# Patient Record
Sex: FEMALE | Race: BLACK OR AFRICAN AMERICAN | NOT HISPANIC OR LATINO | Employment: OTHER | URBAN - METROPOLITAN AREA
[De-identification: names, ages, dates, MRNs, and addresses within clinical notes are randomized per-mention and may not be internally consistent; named-entity substitution may affect disease eponyms.]

---

## 2018-07-08 ENCOUNTER — HOSPITAL ENCOUNTER (EMERGENCY)
Facility: HOSPITAL | Age: 73
Discharge: HOME | End: 2018-07-08
Attending: INTERNAL MEDICINE
Payer: MEDICARE

## 2018-07-08 VITALS
SYSTOLIC BLOOD PRESSURE: 173 MMHG | OXYGEN SATURATION: 96 % | DIASTOLIC BLOOD PRESSURE: 91 MMHG | TEMPERATURE: 98 F | WEIGHT: 159 LBS | HEIGHT: 65 IN | BODY MASS INDEX: 26.49 KG/M2 | HEART RATE: 88 BPM | RESPIRATION RATE: 18 BRPM

## 2018-07-08 DIAGNOSIS — R51.9 ACUTE NONINTRACTABLE HEADACHE, UNSPECIFIED HEADACHE TYPE: Primary | ICD-10-CM

## 2018-07-08 DIAGNOSIS — R42 DIZZINESS: ICD-10-CM

## 2018-07-08 LAB
ALBUMIN SERPL-MCNC: 3 G/DL (ref 3.3–5.5)
ALP SERPL-CCNC: 83 U/L (ref 42–141)
BILIRUB SERPL-MCNC: 0.4 MG/DL (ref 0.2–1.6)
BUN SERPL-MCNC: 10 MG/DL (ref 7–22)
CALCIUM SERPL-MCNC: 9.7 MG/DL (ref 8–10.3)
CHLORIDE SERPL-SCNC: 102 MMOL/L (ref 98–108)
CREAT SERPL-MCNC: 0.5 MG/DL (ref 0.6–1.2)
GLUCOSE SERPL-MCNC: 84 MG/DL (ref 73–118)
POC ALT (SGPT): 19 U/L (ref 10–47)
POC AST (SGOT): 31 U/L (ref 11–38)
POC CARDIAC TROPONIN I: 0 NG/ML
POC TCO2: 28 MMOL/L (ref 18–33)
POCT GLUCOSE: 163 MG/DL (ref 70–110)
POTASSIUM BLD-SCNC: 3.4 MMOL/L (ref 3.6–5.1)
PROTEIN, POC: 8.2 G/DL (ref 6.4–8.1)
SAMPLE: NORMAL
SODIUM BLD-SCNC: 142 MMOL/L (ref 128–145)

## 2018-07-08 PROCEDURE — 93010 ELECTROCARDIOGRAM REPORT: CPT | Mod: ,,, | Performed by: INTERNAL MEDICINE

## 2018-07-08 PROCEDURE — 96360 HYDRATION IV INFUSION INIT: CPT

## 2018-07-08 PROCEDURE — 25000003 PHARM REV CODE 250: Performed by: INTERNAL MEDICINE

## 2018-07-08 PROCEDURE — 82947 ASSAY GLUCOSE BLOOD QUANT: CPT

## 2018-07-08 PROCEDURE — 99284 EMERGENCY DEPT VISIT MOD MDM: CPT | Mod: 25

## 2018-07-08 PROCEDURE — 93005 ELECTROCARDIOGRAM TRACING: CPT

## 2018-07-08 PROCEDURE — 80053 COMPREHEN METABOLIC PANEL: CPT

## 2018-07-08 PROCEDURE — 84484 ASSAY OF TROPONIN QUANT: CPT

## 2018-07-08 PROCEDURE — 85025 COMPLETE CBC W/AUTO DIFF WBC: CPT

## 2018-07-08 RX ORDER — SODIUM CHLORIDE 9 MG/ML
500 INJECTION, SOLUTION INTRAVENOUS ONCE
Status: COMPLETED | OUTPATIENT
Start: 2018-07-08 | End: 2018-07-08

## 2018-07-08 RX ORDER — ACETAMINOPHEN 500 MG
1000 TABLET ORAL
Status: COMPLETED | OUTPATIENT
Start: 2018-07-08 | End: 2018-07-08

## 2018-07-08 RX ORDER — POTASSIUM CHLORIDE 20 MEQ/1
40 TABLET, EXTENDED RELEASE ORAL
Status: COMPLETED | OUTPATIENT
Start: 2018-07-08 | End: 2018-07-08

## 2018-07-08 RX ADMIN — ACETAMINOPHEN 1000 MG: 500 TABLET, FILM COATED ORAL at 07:07

## 2018-07-08 RX ADMIN — SODIUM CHLORIDE 500 ML: 0.9 INJECTION, SOLUTION INTRAVENOUS at 09:07

## 2018-07-08 RX ADMIN — POTASSIUM CHLORIDE 40 MEQ: 1500 TABLET, EXTENDED RELEASE ORAL at 09:07

## 2018-07-09 NOTE — ED PROVIDER NOTES
Encounter Date: 7/8/2018    SCRIBE #1 NOTE: I, Ashley Davis, am scribing for, and in the presence of,  Dr. Bruce. I have scribed the entire note.       History     Chief Complaint   Patient presents with    Headache     headache to left side, pt woke this am with headache.  dizziness     This is a 73 year old female who presents with a headache for a couple hours. She describes the pain as sharp in quality. She rates the pain as 8/10 in severity. She reports the headache is localized to the back of her head. She states it feels like the last time she had a stroke. 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 She reports a stent on right side. She denies any weakness. She reports associated dizziness for the past couple hours. She denies falling or syncope. She is a current smoker and smokes 1 pack a week. She denies dizziness currently.      The history is provided by the patient and a relative.     Review of patient's allergies indicates:   Allergen Reactions    Penicillins      Past Medical History:   Diagnosis Date    Brain aneurysm     Cancer     thyroid    Diabetes mellitus     Hypertension     Stroke      Past Surgical History:   Procedure Laterality Date    OTHER SURGICAL HISTORY      stent in brain      History reviewed. No pertinent family history.  Social History   Substance Use Topics    Smoking status: Unknown If Ever Smoked    Smokeless tobacco: Not on file    Alcohol use No     Review of Systems   Constitutional: Negative.  Negative for chills and fever.   Respiratory: Negative.  Negative for shortness of breath.    Cardiovascular: Negative.  Negative for chest pain.   Gastrointestinal: Negative.  Negative for abdominal distention, nausea and vomiting.   Neurological: Positive for dizziness and headaches. Negative for weakness.   All other systems reviewed and are negative.      Physical Exam     Initial Vitals [07/08/18 1848]   BP Pulse Resp Temp SpO2   (!) 167/80 95 19 98.1 °F (36.7  °C) 97 %      MAP       --         Physical Exam    Nursing note and vitals reviewed.  Constitutional: She appears well-developed and well-nourished.   HENT:   Head: Normocephalic and atraumatic.   Right Ear: External ear normal.   Left Ear: External ear normal.   Nose: Nose normal.   Mouth/Throat: Mucous membranes are normal. Posterior oropharyngeal erythema present. No oropharyngeal exudate or posterior oropharyngeal edema.   Enlarged nasal turbinates   Eyes: Conjunctivae and EOM are normal. Pupils are equal, round, and reactive to light.   Neck: Normal range of motion. Neck supple.   Cardiovascular: Normal rate and normal heart sounds. Exam reveals no gallop and no friction rub.    No murmur heard.  Pulses:       Radial pulses are 2+ on the right side, and 2+ on the left side.        Dorsalis pedis pulses are 2+ on the right side, and 2+ on the left side.   Regularly irregular rhythm with a rate of 67 bpm.   Pulmonary/Chest: Breath sounds normal. No respiratory distress. She has no wheezes. She has no rhonchi. She has no rales. She exhibits no tenderness.   Abdominal: Soft. Bowel sounds are normal. She exhibits no distension and no mass. There is no tenderness. There is no rebound and no guarding.   Musculoskeletal: Normal range of motion. She exhibits no tenderness.   Neurological: She is alert and oriented to person, place, and time. She has normal strength. No cranial nerve deficit or sensory deficit.   Skin: Skin is warm and dry. Capillary refill takes less than 2 seconds. No rash noted.   Psychiatric: She has a normal mood and affect. Her behavior is normal.         ED Course   Procedures  Labs Reviewed   POCT GLUCOSE - Abnormal; Notable for the following:        Result Value    POCT Glucose 163 (*)     All other components within normal limits   POCT GLUCOSE     EKG Readings: (Independently Interpreted)   Initial Reading: No STEMI. Rhythm: Normal Sinus Rhythm. Heart Rate: 86. Axis: Left Axis Deviation.  Clinical Impression: AV Block - 1st Degree with RBBB       Imaging Results    None          Medical Decision Making:   Initial Assessment:   This is a 73 year old female who presents with a headache for a couple hours. She describes the pain as sharp in quality. She rates the pain as 8/10 in severity. She reports the headache is localized to the back of her head. She states it feels like the last time she had a stroke. 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 She reports a stent on right side. She denies any weakness. She reports associated dizziness for the past couple hours. She denies falling or syncope. She is a current smoker and smokes 1 pack a week.  Clinical Tests:   Lab Tests: Ordered and Reviewed  Radiological Study: Reviewed and Ordered  Medical Tests: Ordered and Reviewed  ED Management:  CT reveals no acute intracranial disease.  CBC and CMP were grossly normal except for slightly decreased potassium.  Oral potassium was given in the emergency department and the patient received Tylenol for headache.  She states she feels better after medications and 500 cc normal saline bolus.  She was given instructions for headache and advised to follow-up with her primary care physician tomorrow for reevaluation and to return to the emergency department if condition worsens.            Scribe Attestation:   Scribe #1: I performed the above scribed service and the documentation accurately describes the services I performed. I attest to the accuracy of the note.    This document was produced by a scribe under my direction and in my presence. I agree with the content of the note and have made any necessary edits.     Dr. Bruce    07/08/2018 9:24 PM             Clinical Impression:     1. Acute nonintractable headache, unspecified headache type    2. Dizziness          Disposition:   Disposition: Discharged  Condition: Stable                        Maninder Bruce MD  07/08/18 2128       Maninder Bruce,  MD  07/09/18 0622

## 2020-02-07 ENCOUNTER — HOSPITAL ENCOUNTER (INPATIENT)
Facility: HOSPITAL | Age: 75
LOS: 18 days | Discharge: HOME HEALTH | DRG: 392 | End: 2020-02-25
Attending: HOSPITALIST | Admitting: HOSPITALIST
Payer: MEDICARE

## 2020-02-07 DIAGNOSIS — J44.9 CHRONIC OBSTRUCTIVE PULMONARY DISEASE, UNSPECIFIED COPD TYPE: Primary | ICD-10-CM

## 2020-02-07 DIAGNOSIS — R53.81 DEBILITY: ICD-10-CM

## 2020-02-07 DIAGNOSIS — R13.10 DYSPHAGIA, UNSPECIFIED TYPE: ICD-10-CM

## 2020-02-07 DIAGNOSIS — K63.0 ABSCESS OF SIGMOID COLON: ICD-10-CM

## 2020-02-07 DIAGNOSIS — I50.9 HEART FAILURE: ICD-10-CM

## 2020-02-07 DIAGNOSIS — R94.31 QT PROLONGATION: ICD-10-CM

## 2020-02-07 DIAGNOSIS — I48.0 PAROXYSMAL A-FIB: ICD-10-CM

## 2020-02-07 DIAGNOSIS — I10 ESSENTIAL HYPERTENSION: ICD-10-CM

## 2020-02-07 DIAGNOSIS — R06.03 RESPIRATORY DISTRESS: ICD-10-CM

## 2020-02-07 DIAGNOSIS — E03.9 HYPOTHYROIDISM, UNSPECIFIED TYPE: ICD-10-CM

## 2020-02-07 DIAGNOSIS — B95.61 MSSA BACTEREMIA: ICD-10-CM

## 2020-02-07 DIAGNOSIS — E87.0 HYPERNATREMIA: ICD-10-CM

## 2020-02-07 DIAGNOSIS — R78.81 MSSA BACTEREMIA: ICD-10-CM

## 2020-02-07 DIAGNOSIS — K81.9 CHOLECYSTITIS: ICD-10-CM

## 2020-02-07 DIAGNOSIS — Z75.8 DISCHARGE PLANNING ISSUES: ICD-10-CM

## 2020-02-07 PROBLEM — D50.0 BLOOD LOSS ANEMIA: Status: ACTIVE | Noted: 2020-02-07

## 2020-02-07 PROBLEM — E11.9 TYPE 2 DIABETES MELLITUS, WITHOUT LONG-TERM CURRENT USE OF INSULIN: Status: ACTIVE | Noted: 2020-02-07

## 2020-02-07 PROBLEM — E66.3 OVERWEIGHT (BMI 25.0-29.9): Status: ACTIVE | Noted: 2020-02-07

## 2020-02-07 PROBLEM — R31.9 HEMATURIA: Status: ACTIVE | Noted: 2020-02-07

## 2020-02-07 PROBLEM — J96.91 RESPIRATORY FAILURE WITH HYPOXIA: Status: ACTIVE | Noted: 2020-02-07

## 2020-02-07 LAB
ABO + RH BLD: NORMAL
ALBUMIN SERPL BCP-MCNC: 2.1 G/DL (ref 3.5–5.2)
ALP SERPL-CCNC: 104 U/L (ref 55–135)
ALT SERPL W/O P-5'-P-CCNC: 9 U/L (ref 10–44)
ANION GAP SERPL CALC-SCNC: 10 MMOL/L (ref 8–16)
AST SERPL-CCNC: 14 U/L (ref 10–40)
BASOPHILS # BLD AUTO: 0.03 K/UL (ref 0–0.2)
BASOPHILS NFR BLD: 0.3 % (ref 0–1.9)
BILIRUB SERPL-MCNC: 0.4 MG/DL (ref 0.1–1)
BLD GP AB SCN CELLS X3 SERPL QL: NORMAL
BUN SERPL-MCNC: 11 MG/DL (ref 8–23)
CALCIUM SERPL-MCNC: 8.5 MG/DL (ref 8.7–10.5)
CHLORIDE SERPL-SCNC: 106 MMOL/L (ref 95–110)
CO2 SERPL-SCNC: 27 MMOL/L (ref 23–29)
CREAT SERPL-MCNC: 1.1 MG/DL (ref 0.5–1.4)
DIFFERENTIAL METHOD: ABNORMAL
EOSINOPHIL # BLD AUTO: 0.4 K/UL (ref 0–0.5)
EOSINOPHIL NFR BLD: 3.8 % (ref 0–8)
ERYTHROCYTE [DISTWIDTH] IN BLOOD BY AUTOMATED COUNT: 19.1 % (ref 11.5–14.5)
EST. GFR  (AFRICAN AMERICAN): 58 ML/MIN/1.73 M^2
EST. GFR  (NON AFRICAN AMERICAN): 50.3 ML/MIN/1.73 M^2
ESTIMATED AVG GLUCOSE: 151 MG/DL (ref 68–131)
GLUCOSE SERPL-MCNC: 191 MG/DL (ref 70–110)
HBA1C MFR BLD HPLC: 6.9 % (ref 4–5.6)
HCT VFR BLD AUTO: 25.9 % (ref 37–48.5)
HGB BLD-MCNC: 8.2 G/DL (ref 12–16)
IMM GRANULOCYTES # BLD AUTO: 0.06 K/UL (ref 0–0.04)
IMM GRANULOCYTES NFR BLD AUTO: 0.6 % (ref 0–0.5)
INR PPP: 1.1 (ref 0.8–1.2)
LACTATE SERPL-SCNC: 1.4 MMOL/L (ref 0.5–2.2)
LYMPHOCYTES # BLD AUTO: 1.7 K/UL (ref 1–4.8)
LYMPHOCYTES NFR BLD: 18.5 % (ref 18–48)
MAGNESIUM SERPL-MCNC: 1.7 MG/DL (ref 1.6–2.6)
MCH RBC QN AUTO: 27.8 PG (ref 27–31)
MCHC RBC AUTO-ENTMCNC: 31.7 G/DL (ref 32–36)
MCV RBC AUTO: 88 FL (ref 82–98)
MONOCYTES # BLD AUTO: 1.1 K/UL (ref 0.3–1)
MONOCYTES NFR BLD: 12.2 % (ref 4–15)
NEUTROPHILS # BLD AUTO: 6.1 K/UL (ref 1.8–7.7)
NEUTROPHILS NFR BLD: 64.6 % (ref 38–73)
NRBC BLD-RTO: 0 /100 WBC
PHOSPHATE SERPL-MCNC: 2.6 MG/DL (ref 2.7–4.5)
PLATELET # BLD AUTO: 456 K/UL (ref 150–350)
PMV BLD AUTO: 9.6 FL (ref 9.2–12.9)
POCT GLUCOSE: 208 MG/DL (ref 70–110)
POTASSIUM SERPL-SCNC: 4 MMOL/L (ref 3.5–5.1)
PROT SERPL-MCNC: 7.9 G/DL (ref 6–8.4)
PROTHROMBIN TIME: 11.1 SEC (ref 9–12.5)
RBC # BLD AUTO: 2.95 M/UL (ref 4–5.4)
SODIUM SERPL-SCNC: 143 MMOL/L (ref 136–145)
WBC # BLD AUTO: 9.37 K/UL (ref 3.9–12.7)

## 2020-02-07 PROCEDURE — 93010 EKG 12-LEAD: ICD-10-PCS | Mod: ,,, | Performed by: INTERNAL MEDICINE

## 2020-02-07 PROCEDURE — 86901 BLOOD TYPING SEROLOGIC RH(D): CPT

## 2020-02-07 PROCEDURE — S0030 INJECTION, METRONIDAZOLE: HCPCS | Performed by: STUDENT IN AN ORGANIZED HEALTH CARE EDUCATION/TRAINING PROGRAM

## 2020-02-07 PROCEDURE — 25000242 PHARM REV CODE 250 ALT 637 W/ HCPCS: Performed by: STUDENT IN AN ORGANIZED HEALTH CARE EDUCATION/TRAINING PROGRAM

## 2020-02-07 PROCEDURE — 84100 ASSAY OF PHOSPHORUS: CPT

## 2020-02-07 PROCEDURE — 83605 ASSAY OF LACTIC ACID: CPT

## 2020-02-07 PROCEDURE — 83036 HEMOGLOBIN GLYCOSYLATED A1C: CPT

## 2020-02-07 PROCEDURE — 83735 ASSAY OF MAGNESIUM: CPT

## 2020-02-07 PROCEDURE — 63600175 PHARM REV CODE 636 W HCPCS: Performed by: INTERNAL MEDICINE

## 2020-02-07 PROCEDURE — 63600175 PHARM REV CODE 636 W HCPCS: Performed by: STUDENT IN AN ORGANIZED HEALTH CARE EDUCATION/TRAINING PROGRAM

## 2020-02-07 PROCEDURE — 85025 COMPLETE CBC W/AUTO DIFF WBC: CPT

## 2020-02-07 PROCEDURE — 93005 ELECTROCARDIOGRAM TRACING: CPT

## 2020-02-07 PROCEDURE — 11000001 HC ACUTE MED/SURG PRIVATE ROOM

## 2020-02-07 PROCEDURE — 85610 PROTHROMBIN TIME: CPT

## 2020-02-07 PROCEDURE — 80053 COMPREHEN METABOLIC PANEL: CPT

## 2020-02-07 PROCEDURE — 25000003 PHARM REV CODE 250: Performed by: STUDENT IN AN ORGANIZED HEALTH CARE EDUCATION/TRAINING PROGRAM

## 2020-02-07 PROCEDURE — 93010 ELECTROCARDIOGRAM REPORT: CPT | Mod: ,,, | Performed by: INTERNAL MEDICINE

## 2020-02-07 PROCEDURE — 87040 BLOOD CULTURE FOR BACTERIA: CPT | Mod: 59

## 2020-02-07 PROCEDURE — 36415 COLL VENOUS BLD VENIPUNCTURE: CPT

## 2020-02-07 RX ORDER — CIPROFLOXACIN 2 MG/ML
400 INJECTION, SOLUTION INTRAVENOUS
Status: DISCONTINUED | OUTPATIENT
Start: 2020-02-07 | End: 2020-02-10

## 2020-02-07 RX ORDER — GLUCAGON 1 MG
1 KIT INJECTION
Status: DISCONTINUED | OUTPATIENT
Start: 2020-02-07 | End: 2020-02-18

## 2020-02-07 RX ORDER — AMLODIPINE BESYLATE 10 MG/1
10 TABLET ORAL DAILY
COMMUNITY

## 2020-02-07 RX ORDER — IBUPROFEN 200 MG
24 TABLET ORAL
Status: DISCONTINUED | OUTPATIENT
Start: 2020-02-07 | End: 2020-02-18

## 2020-02-07 RX ORDER — INSULIN ASPART 100 [IU]/ML
1-10 INJECTION, SOLUTION INTRAVENOUS; SUBCUTANEOUS EVERY 6 HOURS PRN
Status: DISCONTINUED | OUTPATIENT
Start: 2020-02-07 | End: 2020-02-16

## 2020-02-07 RX ORDER — HYDROCHLOROTHIAZIDE 12.5 MG/1
12.5 TABLET ORAL DAILY
Status: ON HOLD | COMMUNITY
End: 2020-02-24 | Stop reason: HOSPADM

## 2020-02-07 RX ORDER — INSULIN ASPART 100 [IU]/ML
1-10 INJECTION, SOLUTION INTRAVENOUS; SUBCUTANEOUS
Status: DISCONTINUED | OUTPATIENT
Start: 2020-02-07 | End: 2020-02-07

## 2020-02-07 RX ORDER — AMLODIPINE BESYLATE 10 MG/1
10 TABLET ORAL DAILY
Status: DISCONTINUED | OUTPATIENT
Start: 2020-02-08 | End: 2020-02-25 | Stop reason: HOSPADM

## 2020-02-07 RX ORDER — SODIUM CHLORIDE 0.9 % (FLUSH) 0.9 %
5 SYRINGE (ML) INJECTION
Status: DISCONTINUED | OUTPATIENT
Start: 2020-02-07 | End: 2020-02-25 | Stop reason: HOSPADM

## 2020-02-07 RX ORDER — IBUPROFEN 200 MG
16 TABLET ORAL
Status: DISCONTINUED | OUTPATIENT
Start: 2020-02-07 | End: 2020-02-18

## 2020-02-07 RX ORDER — LEVOTHYROXINE SODIUM 100 UG/1
200 TABLET ORAL
Status: DISCONTINUED | OUTPATIENT
Start: 2020-02-08 | End: 2020-02-25 | Stop reason: HOSPADM

## 2020-02-07 RX ORDER — HEPARIN SODIUM 5000 [USP'U]/ML
5000 INJECTION, SOLUTION INTRAVENOUS; SUBCUTANEOUS EVERY 8 HOURS
Status: DISCONTINUED | OUTPATIENT
Start: 2020-02-07 | End: 2020-02-08

## 2020-02-07 RX ORDER — METFORMIN HYDROCHLORIDE 500 MG/1
500 TABLET ORAL 3 TIMES DAILY
Status: ON HOLD | COMMUNITY
End: 2020-02-10

## 2020-02-07 RX ORDER — RIFAMPIN 300 MG/1
600 CAPSULE ORAL DAILY
Status: DISCONTINUED | OUTPATIENT
Start: 2020-02-08 | End: 2020-02-07

## 2020-02-07 RX ORDER — SODIUM,POTASSIUM PHOSPHATES 280-250MG
2 POWDER IN PACKET (EA) ORAL ONCE
Status: COMPLETED | OUTPATIENT
Start: 2020-02-07 | End: 2020-02-07

## 2020-02-07 RX ORDER — ONDANSETRON 8 MG/1
8 TABLET, ORALLY DISINTEGRATING ORAL EVERY 8 HOURS PRN
Status: DISCONTINUED | OUTPATIENT
Start: 2020-02-07 | End: 2020-02-25 | Stop reason: HOSPADM

## 2020-02-07 RX ORDER — HYDRALAZINE HYDROCHLORIDE 10 MG/1
10 TABLET, FILM COATED ORAL EVERY 12 HOURS
Status: ON HOLD | COMMUNITY
End: 2020-02-13 | Stop reason: HOSPADM

## 2020-02-07 RX ORDER — ACETAMINOPHEN 325 MG/1
650 TABLET ORAL EVERY 4 HOURS PRN
Status: DISCONTINUED | OUTPATIENT
Start: 2020-02-07 | End: 2020-02-25 | Stop reason: HOSPADM

## 2020-02-07 RX ORDER — LEVOTHYROXINE SODIUM 200 UG/1
200 TABLET ORAL
COMMUNITY

## 2020-02-07 RX ORDER — GLIMEPIRIDE 4 MG/1
4 TABLET ORAL
COMMUNITY

## 2020-02-07 RX ORDER — METRONIDAZOLE 500 MG/100ML
500 INJECTION, SOLUTION INTRAVENOUS
Status: DISCONTINUED | OUTPATIENT
Start: 2020-02-07 | End: 2020-02-10

## 2020-02-07 RX ORDER — METOPROLOL TARTRATE 25 MG/1
25 TABLET, FILM COATED ORAL 4 TIMES DAILY
Status: DISCONTINUED | OUTPATIENT
Start: 2020-02-07 | End: 2020-02-11

## 2020-02-07 RX ORDER — IPRATROPIUM BROMIDE AND ALBUTEROL SULFATE 2.5; .5 MG/3ML; MG/3ML
3 SOLUTION RESPIRATORY (INHALATION) EVERY 6 HOURS
Status: DISCONTINUED | OUTPATIENT
Start: 2020-02-07 | End: 2020-02-09

## 2020-02-07 RX ADMIN — HEPARIN SODIUM 5000 UNITS: 5000 INJECTION, SOLUTION INTRAVENOUS; SUBCUTANEOUS at 10:02

## 2020-02-07 RX ADMIN — POTASSIUM & SODIUM PHOSPHATES POWDER PACK 280-160-250 MG 2 PACKET: 280-160-250 PACK at 10:02

## 2020-02-07 RX ADMIN — CIPROFLOXACIN 400 MG: 2 INJECTION, SOLUTION INTRAVENOUS at 10:02

## 2020-02-07 RX ADMIN — METRONIDAZOLE 500 MG: 500 INJECTION, SOLUTION INTRAVENOUS at 10:02

## 2020-02-07 RX ADMIN — VANCOMYCIN HYDROCHLORIDE 750 MG: 750 INJECTION, POWDER, LYOPHILIZED, FOR SOLUTION INTRAVENOUS at 10:02

## 2020-02-07 RX ADMIN — METOPROLOL TARTRATE 25 MG: 25 TABLET ORAL at 10:02

## 2020-02-07 RX ADMIN — IPRATROPIUM BROMIDE AND ALBUTEROL SULFATE 3 ML: .5; 3 SOLUTION RESPIRATORY (INHALATION) at 10:02

## 2020-02-08 LAB
ALBUMIN SERPL BCP-MCNC: 2 G/DL (ref 3.5–5.2)
ALLENS TEST: ABNORMAL
ALP SERPL-CCNC: 84 U/L (ref 55–135)
ALT SERPL W/O P-5'-P-CCNC: 8 U/L (ref 10–44)
ANION GAP SERPL CALC-SCNC: 8 MMOL/L (ref 8–16)
AST SERPL-CCNC: 17 U/L (ref 10–40)
BACTERIA #/AREA URNS AUTO: ABNORMAL /HPF
BASOPHILS # BLD AUTO: 0.02 K/UL (ref 0–0.2)
BASOPHILS NFR BLD: 0.2 % (ref 0–1.9)
BILIRUB SERPL-MCNC: 0.4 MG/DL (ref 0.1–1)
BILIRUB UR QL STRIP: NEGATIVE
BNP SERPL-MCNC: 760 PG/ML (ref 0–99)
BUN SERPL-MCNC: 10 MG/DL (ref 8–23)
CALCIUM SERPL-MCNC: 8 MG/DL (ref 8.7–10.5)
CHLORIDE SERPL-SCNC: 107 MMOL/L (ref 95–110)
CLARITY UR REFRACT.AUTO: ABNORMAL
CO2 SERPL-SCNC: 27 MMOL/L (ref 23–29)
COLOR UR AUTO: YELLOW
CREAT SERPL-MCNC: 1 MG/DL (ref 0.5–1.4)
DELSYS: ABNORMAL
DIFFERENTIAL METHOD: ABNORMAL
EOSINOPHIL # BLD AUTO: 0.3 K/UL (ref 0–0.5)
EOSINOPHIL NFR BLD: 3.8 % (ref 0–8)
ERYTHROCYTE [DISTWIDTH] IN BLOOD BY AUTOMATED COUNT: 19.1 % (ref 11.5–14.5)
EST. GFR  (AFRICAN AMERICAN): >60 ML/MIN/1.73 M^2
EST. GFR  (NON AFRICAN AMERICAN): 56.4 ML/MIN/1.73 M^2
FIO2: 32
GLUCOSE SERPL-MCNC: 202 MG/DL (ref 70–110)
GLUCOSE UR QL STRIP: NEGATIVE
HCO3 UR-SCNC: 28.6 MMOL/L (ref 24–28)
HCT VFR BLD AUTO: 24.5 % (ref 37–48.5)
HGB BLD-MCNC: 7.5 G/DL (ref 12–16)
HGB UR QL STRIP: ABNORMAL
IMM GRANULOCYTES # BLD AUTO: 0.04 K/UL (ref 0–0.04)
IMM GRANULOCYTES NFR BLD AUTO: 0.5 % (ref 0–0.5)
KETONES UR QL STRIP: NEGATIVE
LEUKOCYTE ESTERASE UR QL STRIP: ABNORMAL
LYMPHOCYTES # BLD AUTO: 1.3 K/UL (ref 1–4.8)
LYMPHOCYTES NFR BLD: 15.6 % (ref 18–48)
MAGNESIUM SERPL-MCNC: 1.7 MG/DL (ref 1.6–2.6)
MCH RBC QN AUTO: 27.1 PG (ref 27–31)
MCHC RBC AUTO-ENTMCNC: 30.6 G/DL (ref 32–36)
MCV RBC AUTO: 88 FL (ref 82–98)
MICROSCOPIC COMMENT: ABNORMAL
MODE: ABNORMAL
MONOCYTES # BLD AUTO: 1 K/UL (ref 0.3–1)
MONOCYTES NFR BLD: 12.4 % (ref 4–15)
NEUTROPHILS # BLD AUTO: 5.7 K/UL (ref 1.8–7.7)
NEUTROPHILS NFR BLD: 67.5 % (ref 38–73)
NITRITE UR QL STRIP: NEGATIVE
NRBC BLD-RTO: 0 /100 WBC
PCO2 BLDA: 44.6 MMHG (ref 35–45)
PH SMN: 7.41 [PH] (ref 7.35–7.45)
PH UR STRIP: 7 [PH] (ref 5–8)
PHOSPHATE SERPL-MCNC: 3.3 MG/DL (ref 2.7–4.5)
PLATELET # BLD AUTO: 433 K/UL (ref 150–350)
PMV BLD AUTO: 9.5 FL (ref 9.2–12.9)
PO2 BLDA: 39 MMHG (ref 40–60)
POC BE: 4 MMOL/L
POC SATURATED O2: 73 % (ref 95–100)
POC TCO2: 30 MMOL/L (ref 24–29)
POCT GLUCOSE: 192 MG/DL (ref 70–110)
POCT GLUCOSE: 209 MG/DL (ref 70–110)
POTASSIUM SERPL-SCNC: 3.7 MMOL/L (ref 3.5–5.1)
PROT SERPL-MCNC: 7.3 G/DL (ref 6–8.4)
PROT UR QL STRIP: NEGATIVE
RBC # BLD AUTO: 2.77 M/UL (ref 4–5.4)
RBC #/AREA URNS AUTO: 2 /HPF (ref 0–4)
SAMPLE: ABNORMAL
SITE: ABNORMAL
SODIUM SERPL-SCNC: 142 MMOL/L (ref 136–145)
SP GR UR STRIP: 1 (ref 1–1.03)
SP02: 95
SQUAMOUS #/AREA URNS AUTO: 1 /HPF
URN SPEC COLLECT METH UR: ABNORMAL
WBC # BLD AUTO: 8.42 K/UL (ref 3.9–12.7)
WBC #/AREA URNS AUTO: 2 /HPF (ref 0–5)
YEAST UR QL AUTO: ABNORMAL

## 2020-02-08 PROCEDURE — 81001 URINALYSIS AUTO W/SCOPE: CPT

## 2020-02-08 PROCEDURE — 97162 PT EVAL MOD COMPLEX 30 MIN: CPT

## 2020-02-08 PROCEDURE — 25500020 PHARM REV CODE 255: Performed by: INTERNAL MEDICINE

## 2020-02-08 PROCEDURE — 99223 PR INITIAL HOSPITAL CARE,LEVL III: ICD-10-PCS | Mod: ,,, | Performed by: INTERNAL MEDICINE

## 2020-02-08 PROCEDURE — 99223 PR INITIAL HOSPITAL CARE,LEVL III: ICD-10-PCS | Mod: ,,, | Performed by: PHYSICIAN ASSISTANT

## 2020-02-08 PROCEDURE — 97166 OT EVAL MOD COMPLEX 45 MIN: CPT

## 2020-02-08 PROCEDURE — S0030 INJECTION, METRONIDAZOLE: HCPCS | Performed by: STUDENT IN AN ORGANIZED HEALTH CARE EDUCATION/TRAINING PROGRAM

## 2020-02-08 PROCEDURE — 11000001 HC ACUTE MED/SURG PRIVATE ROOM

## 2020-02-08 PROCEDURE — 94799 UNLISTED PULMONARY SVC/PX: CPT

## 2020-02-08 PROCEDURE — 94761 N-INVAS EAR/PLS OXIMETRY MLT: CPT

## 2020-02-08 PROCEDURE — 83735 ASSAY OF MAGNESIUM: CPT

## 2020-02-08 PROCEDURE — 25000003 PHARM REV CODE 250: Performed by: STUDENT IN AN ORGANIZED HEALTH CARE EDUCATION/TRAINING PROGRAM

## 2020-02-08 PROCEDURE — 63600175 PHARM REV CODE 636 W HCPCS: Performed by: STUDENT IN AN ORGANIZED HEALTH CARE EDUCATION/TRAINING PROGRAM

## 2020-02-08 PROCEDURE — 83880 ASSAY OF NATRIURETIC PEPTIDE: CPT

## 2020-02-08 PROCEDURE — 25000242 PHARM REV CODE 250 ALT 637 W/ HCPCS: Performed by: STUDENT IN AN ORGANIZED HEALTH CARE EDUCATION/TRAINING PROGRAM

## 2020-02-08 PROCEDURE — 99900035 HC TECH TIME PER 15 MIN (STAT)

## 2020-02-08 PROCEDURE — 80053 COMPREHEN METABOLIC PANEL: CPT

## 2020-02-08 PROCEDURE — 92610 EVALUATE SWALLOWING FUNCTION: CPT

## 2020-02-08 PROCEDURE — 63600175 PHARM REV CODE 636 W HCPCS: Performed by: INTERNAL MEDICINE

## 2020-02-08 PROCEDURE — 99223 1ST HOSP IP/OBS HIGH 75: CPT | Mod: ,,, | Performed by: PHYSICIAN ASSISTANT

## 2020-02-08 PROCEDURE — 27000221 HC OXYGEN, UP TO 24 HOURS

## 2020-02-08 PROCEDURE — 99223 1ST HOSP IP/OBS HIGH 75: CPT | Mod: ,,, | Performed by: INTERNAL MEDICINE

## 2020-02-08 PROCEDURE — 85025 COMPLETE CBC W/AUTO DIFF WBC: CPT

## 2020-02-08 PROCEDURE — 84100 ASSAY OF PHOSPHORUS: CPT

## 2020-02-08 PROCEDURE — 94640 AIRWAY INHALATION TREATMENT: CPT

## 2020-02-08 RX ORDER — FUROSEMIDE 10 MG/ML
40 INJECTION INTRAMUSCULAR; INTRAVENOUS ONCE
Status: COMPLETED | OUTPATIENT
Start: 2020-02-08 | End: 2020-02-08

## 2020-02-08 RX ADMIN — IPRATROPIUM BROMIDE AND ALBUTEROL SULFATE 3 ML: .5; 3 SOLUTION RESPIRATORY (INHALATION) at 08:02

## 2020-02-08 RX ADMIN — METOPROLOL TARTRATE 25 MG: 25 TABLET ORAL at 05:02

## 2020-02-08 RX ADMIN — METRONIDAZOLE 500 MG: 500 INJECTION, SOLUTION INTRAVENOUS at 05:02

## 2020-02-08 RX ADMIN — CIPROFLOXACIN 400 MG: 2 INJECTION, SOLUTION INTRAVENOUS at 08:02

## 2020-02-08 RX ADMIN — SODIUM CHLORIDE, SODIUM LACTATE, POTASSIUM CHLORIDE, AND CALCIUM CHLORIDE 500 ML: .6; .31; .03; .02 INJECTION, SOLUTION INTRAVENOUS at 03:02

## 2020-02-08 RX ADMIN — IOHEXOL 75 ML: 350 INJECTION, SOLUTION INTRAVENOUS at 10:02

## 2020-02-08 RX ADMIN — SODIUM CHLORIDE 500 ML: 0.9 INJECTION, SOLUTION INTRAVENOUS at 12:02

## 2020-02-08 RX ADMIN — AMLODIPINE BESYLATE 10 MG: 10 TABLET ORAL at 08:02

## 2020-02-08 RX ADMIN — METOPROLOL TARTRATE 25 MG: 25 TABLET ORAL at 08:02

## 2020-02-08 RX ADMIN — FUROSEMIDE 40 MG: 10 INJECTION, SOLUTION INTRAMUSCULAR; INTRAVENOUS at 06:02

## 2020-02-08 RX ADMIN — METOPROLOL TARTRATE 25 MG: 25 TABLET ORAL at 10:02

## 2020-02-08 RX ADMIN — METRONIDAZOLE 500 MG: 500 INJECTION, SOLUTION INTRAVENOUS at 09:02

## 2020-02-08 RX ADMIN — LEVOTHYROXINE SODIUM 200 MCG: 100 TABLET ORAL at 06:02

## 2020-02-08 RX ADMIN — IPRATROPIUM BROMIDE AND ALBUTEROL SULFATE 3 ML: .5; 3 SOLUTION RESPIRATORY (INHALATION) at 01:02

## 2020-02-08 RX ADMIN — METOPROLOL TARTRATE 25 MG: 25 TABLET ORAL at 12:02

## 2020-02-08 RX ADMIN — CIPROFLOXACIN 400 MG: 2 INJECTION, SOLUTION INTRAVENOUS at 09:02

## 2020-02-08 RX ADMIN — METRONIDAZOLE 500 MG: 500 INJECTION, SOLUTION INTRAVENOUS at 12:02

## 2020-02-08 RX ADMIN — VANCOMYCIN HYDROCHLORIDE 750 MG: 750 INJECTION, POWDER, LYOPHILIZED, FOR SOLUTION INTRAVENOUS at 10:02

## 2020-02-08 RX ADMIN — VANCOMYCIN HYDROCHLORIDE 750 MG: 750 INJECTION, POWDER, LYOPHILIZED, FOR SOLUTION INTRAVENOUS at 08:02

## 2020-02-08 NOTE — PLAN OF CARE
Evaluation completed, plan of care established.    Problem: Occupational Therapy Goal  Goal: Occupational Therapy Goal  Description  Pt will complete GH tasks with stand by assistance  Pt will perform bed mobility with stand by assist  Pt will complete UB dressing with contact guard assist  Pt will follow simple commands 2/3 trials for GH task  Pt will sit edge of bed for >10 minutes for functional task with contact guard assist     Outcome: Ongoing, Progressing

## 2020-02-08 NOTE — CONSULTS
[facility]  Infectious Disease  Consult Note    Patient Name: Jeni Chacon  MRN: [id number]  Admission Date: 2/7/2020  Hospital Length of Stay: 1 days  Attending Physician: Tiara Killian MD  Primary Care Provider: Primary Doctor No          Inpatient consult to Infectious Diseases  Consult performed by: ROSANNE Gonzalez Jr.  Consult ordered by: Davis Mcfadden MD      Consult received.  Full consult to to follow.    ROSANNE Bailey  Infectious Disease  [facility]

## 2020-02-08 NOTE — PLAN OF CARE
Pt alert to self. V/s stable. No complaints of pain or discomfort. Pt able to eat ice chips with assist. Incontinence care provided. purewick implemented. Tele sitter at bedside and bed alarm set. Will continue to monitor

## 2020-02-08 NOTE — HPI
Patient is a 74 year old female with medical history significant for hypothyroidism, HTN, DMII with neuropathy, tobacco abuse (with no diagnosis of COPD), and history of CVA s/p  shunt who initially presented to [facility] with complaints of SOB and abdominal pain. Although patient is oriented to person, time, and place, she still is having some odd behavior. So I gathered her history from her, her medical chart, and from calling her daughter. She reports that late January she had been having abdominal pain with associated SOB for a few weeks; despite a heavy smoking history, family reports that she has never had SOB or a diagnosis of COPD. She reports no alleviating or aggravating factors, just that the pain was sharp and diffuse.   She was admitted on 1/19 and had a complicated hospital course. Briefly, patient underwent CT abdomen on 1/20 which showed evidence of cholecystitis as well as an sigmoid colon abscess. She had a percutaneous cholecystostomy tube placed on 1/24. The abscess was not drained as IR felt it was unsafe to do so. During her hospital course, her blood cultures were positive for MSSA and she had evidence of UTI. Her antibiotics were changed a few times, but she currently is on cipro and flagyl for intraabdominal infection prophylaxis and on vanc and rifampin for MSSA bacteremia. KESHIA showed inferobasal hypokinesis and EF of 55%; no vegetation noted.  In addition to this she had O2 saturation in the mid-80s. Initially she was placed on bipap and treated for COPD exacerbation; she completed a course of steroids and got one dose of Lasix. However her respiratory status worsening and she was placed in MICU and required intubation from 1/20-1/31. She was Bipap dependent following extubation until 2/6 and is now on nasal cannula; of note, she did require pressors during MICU stay. On top of all this, patient also developed paroxysmal a fib. CHADSVASC elevated however she could not be  anticoagulated due to gross hematuria requiring blood transfusion. Patient transferred for second IR opinion regarding drainage of sigmoid abscess and out of confusion and frustration from the multiple consultants at [facility].     At the time of my exam, patient was tachycardic, tachypneic, and mildly hypertension. She was AAO x3, however denied all symptoms on review of systems despite appear to have increased work of breathing. She reports that this is her baseline breathing and that she is on 3 L of O2 at home, however she exhibits odd behavior like trying to squirm out of the bed, or being unable to sign blood consent because it does not look right. Of note, patient was transferred with PICC line, however per chart review, it appears that her bacteremia resulted from cultures on 1/20, and the first vanc trough was taken on 1/23. Follow up culture have been negative and patient reports that she got her PICC line more recently. In addition, patient was transferred with NG tube. She reports that she is able to swallow and that the last time she ate was yesterday, however per nursing hand off, patient was to get formal speech eval which was not done due to transfer.

## 2020-02-08 NOTE — PLAN OF CARE
Eval completed and POC established     Jonathan Gutierres, PT, DPT  2020         Problem: Physical Therapy Goal  Goal: Physical Therapy Goal  Description  Goals to be met by: 3/8/2020    Patient will increase functional independence with mobility by performin. Supine to sit with MInimal Assistance  2. Sit to supine with MInimal Assistance  3. Sit to stand transfer with Moderate Assistance  4. Gait  x 15 feet with Minimal Assistance using LRAD  5. Lower extremity exercise program x15 reps per handout, with independence    Outcome: Ongoing, Progressing

## 2020-02-08 NOTE — ASSESSMENT & PLAN NOTE
- Patient with NG tube in place due to inability to swallow prior to transfer  - Per nursing handoff patient was to get official speech evaluation, but was transferred prior  - Speech consult placed

## 2020-02-08 NOTE — H&P
[facility] Medicine  History & Physical    Patient Name: Jeni Chacon  MRN: [id number]  Admission Date: 2/7/2020  Attending Physician: Tiara Killian MD   Primary Care Provider: Primary Doctor [facility] Medicine Team: [facility] 2 Davis Mcfadden MD     Patient information was obtained from patient, relative(s) and ER records.     Subjective:     Principal Problem:Abscess of sigmoid colon    Chief Complaint:   Chief Complaint   Patient presents with    Abdominal Pain        HPI: Patient is a 74 year old female with medical history significant for hypothyroidism, HTN, DMII with neuropathy, tobacco abuse (with no diagnosis of COPD), and history of CVA s/p  shunt who initially presented to [place] with complaints of SOB and abdominal pain. Although patient is oriented to person, time, and place, she still is having some odd behavior. So I gathered her history from her, her medical chart, and from calling her daughter. She reports that late January she had been having abdominal pain with associated SOB for a few weeks; despite a heavy smoking history, family reports that she has never had SOB or a diagnosis of COPD. She reports no alleviating or aggravating factors, just that the pain was sharp and diffuse.   She was admitted on 1/19 and had a complicated hospital course. Briefly, patient underwent CT abdomen on 1/20 which showed evidence of cholecystitis as well as an sigmoid colon abscess. She had a percutaneous cholecystostomy tube placed on 1/24. The abscess was not drained as IR felt it was unsafe to do so. During her hospital course, her blood cultures were positive for MSSA and she had evidence of UTI. Her antibiotics were changed a few times, but she currently is on cipro and flagyl for intraabdominal infection prophylaxis and on vanc and rifampin for MSSA bacteremia. KESHIA showed inferobasal hypokinesis and EF of 55%; no vegetation noted.  In addition to this she  had O2 saturation in the mid-80s. Initially she was placed on bipap and treated for COPD exacerbation; she completed a course of steroids and got one dose of Lasix. However her respiratory status worsening and she was placed in MICU and required intubation from 1/20-1/31. She was Bipap dependent following extubation until 2/6 and is now on nasal cannula; of note, she did require pressors during MICU stay. On top of all this, patient also developed paroxysmal a fib. CHADSVASC elevated however she could not be anticoagulated due to gross hematuria requiring blood transfusion. Patient transferred for second IR opinion regarding drainage of sigmoid abscess and out of confusion and frustration from the multiple consultants at [place].     At the time of my exam, patient was tachycardic, tachypneic, and mildly hypertension. She was AAO x3, however denied all symptoms on review of systems despite appear to have increased work of breathing. She reports that this is her baseline breathing and that she is on 3 L of O2 at home, however she exhibits odd behavior like trying to squirm out of the bed, or being unable to sign blood consent because it does not look right. Of note, patient was transferred with PICC line, however per chart review, it appears that her bacteremia resulted from cultures on 1/20, and the first vanc trough was taken on 1/23. Follow up culture have been negative and patient reports that she got her PICC line more recently. In addition, patient was transferred with NG tube. She reports that she is able to swallow and that the last time she ate was yesterday, however per nursing hand off, patient was to get formal speech eval which was not done due to transfer.      Past Medical History:   Diagnosis Date    Brain aneurysm     Cancer     thyroid    Diabetes mellitus     Diabetic neuropathy     Hypertension     Hypothyroid     Stroke     Tobacco abuse        Past Surgical History:   Procedure  Laterality Date    Breast screening      FETOSCOPIC LASER OF POSTERIOR URETHRAL VALVE W/ SHUNT PLACEMENT      OTHER SURGICAL HISTORY      stent in brain     THYROID SURGERY      TUBAL LIGATION         Review of patient's allergies indicates:   Allergen Reactions    Penicillins        Current Facility-Administered Medications on File Prior to Encounter   Medication    [DISCONTINUED] albuterol sulfate nebulizer solution    [DISCONTINUED] GENERIC EXTERNAL MEDICATION    [DISCONTINUED] insulin detemir U-100 injection     Current Outpatient Medications on File Prior to Encounter   Medication Sig    amLODIPine (NORVASC) 10 MG tablet Take 10 mg by mouth once daily.    glimepiride (AMARYL) 4 MG tablet Take 4 mg by mouth before breakfast.    hydrALAZINE (APRESOLINE) 10 MG tablet Take 10 mg by mouth every 12 (twelve) hours.    hydroCHLOROthiazide (HYDRODIURIL) 12.5 MG Tab Take 12.5 mg by mouth once daily.    levothyroxine (SYNTHROID) 200 MCG tablet Take 200 mcg by mouth before breakfast.    metFORMIN (GLUCOPHAGE) 500 MG tablet Take 500 mg by mouth 3 (three) times daily.     Family History     Problem Relation (Age of Onset)    Diabetes Mother    HIV Son    Heart disease Mother, Son    Hypertension Mother        Tobacco Use    Smoking status: Former Smoker     Packs/day: 1.00     Types: Cigarettes     Last attempt to quit: 2019     Years since quittin.6    Tobacco comment: Many years   Substance and Sexual Activity    Alcohol use: No    Drug use: No    Sexual activity: Not on file     Review of Systems   Unable to perform ROS: Mental status change   * Patient denies all symptoms despite appearing to have increased work of breathing and looking generally uncomfortable  Objective:     Vital Signs (Most Recent):  Temp: 99.5 °F (37.5 °C) (20)  Pulse: 99 (20)  Resp: (!) 22 (20)  BP: (!) 144/70 (20)  SpO2: (!) 94 % (20) Vital Signs (24h Range):  Temp:   [99.5 °F (37.5 °C)] 99.5 °F (37.5 °C)  Pulse:  [90-99] 99  Resp:  [22-26] 22  SpO2:  [94 %-97 %] 94 %  BP: (105-144)/(67-75) 144/70     Weight: 77.6 kg (171 lb 1.2 oz)  Body mass index is 28.47 kg/m².    Physical Exam   Constitutional: She is oriented to person, place, and time. She appears well-developed and well-nourished. She appears distressed.   HENT:   Head: Normocephalic and atraumatic.   Mouth/Throat: No oropharyngeal exudate (however, large amount of mucus).   Lips appear swollen, unclear baseline  Mouth mucosa appears dry  NG tube in place   Eyes: Pupils are equal, round, and reactive to light. No scleral icterus.   Neck: Normal range of motion. Neck supple. No JVD present.   Cardiovascular: Regular rhythm, normal heart sounds and intact distal pulses.   No murmur heard.  Tachycardic   Pulmonary/Chest: Effort normal. She has rales (bilateral lower lung fields).   NC 3L   Abdominal: Soft. Bowel sounds are normal. There is no tenderness. There is no guarding.   percutaneous drain noted. No erythema or tenderness   Genitourinary:   Genitourinary Comments: Purewick in place   Musculoskeletal: Normal range of motion. She exhibits no edema (legs appear dry), tenderness or deformity.   Neurological: She is alert and oriented to person, place, and time. A sensory deficit (pre-existing neuropathy in lower extremtiy) is present.   Unable to do full cranial nerve exam as patient would not participate   Skin: Skin is warm and dry.   Psychiatric:   Exhibits odd behavior. Unknown baseline. Patient's daughter says that patient has not acted strange prior to initial admission   Vitals reviewed.        CRANIAL NERVES     CN III, IV, VI   Pupils are equal, round, and reactive to light.       Significant Labs: All pertinent labs within the past 24 hours have been reviewed.    Significant Imaging: I have reviewed all pertinent imaging results/findings within the past 24 hours.    Assessment/Plan:     * Abscess of sigmoid  colon  - Patient with evidence of  an abscess of 3.5 x 5 x 5.3 cm insinuated between the sigmoid colon and the dome of the urinary bladder, possibly in the setting of sigmoid diverticulitis  - Not drained by IR at the time as it was deemed unsafe to do so  - Family wanted transfer for IR second opinion. IR consulted call in the morning  - Blood cultures x2 collected, per outside hospital records, blood cultures have been clear since 1/20 ( See MSSA bacteremia)  - Continue cipro flagyl for now, lactate wnl      COPD (chronic obstructive pulmonary disease)  Respiratory failure with hypoxia  Respiratory distress    - Patient treated for COPD exacerbation with full course of steroids and breathing treatments prior to transfer  - Patient does not have formal diagnosis of COPD but reports heavy smoking history at a pack a day for many years  - Patient also reports using 3 L O2 at home, but daughter is not sure. Unspecified chronicity of hypoxic respiratory failure  - Will continue breathing treatment q6 and incentive spirometry  - Will follow up vbg as patient has apparent increased work of breathing.  - Patient is not on any inhalers at home, she will likely need rescue inhaler and inhaled corticosteroid at minimum at discharge       Dysphagia  - Patient with NG tube in place due to inability to swallow prior to transfer  - Per nursing handoff patient was to get official speech evaluation, but was transferred prior  - Speech consult placed      Paroxysmal A-fib  - Patient with reports of new onset paroxysmal afib during course of hospital stay  - Appears to be in sinus rhythm at the time of initial exam  - Continue metoprolol. Patient was getting 25 mg QID prior to transfer. Unclear if this was out of caution for hypotension or if increased frequency was needed for continued rate control. Will keep at this dose for now as patient may have received some doses prior to transfer. Consider switching to metoprolol succinate  in the morning.   - Patient not anticoagulated at this time due to significant hematuria prior to transfer  * [id number] Score for Atrial Fibrillation Stroke Ris  RESULT SUMMARY:  7 points  Stroke risk was 11.2% per year in >90,000 patients (the [place] Atrial Fibrillation Cohort Study) and 15.7% risk of stroke/TIA/systemic embolism  INPUTS:  Age --> 1 = 65-74  Sex --> 1 = Female  CHF history --> 0 = No  Hypertension history --> 1 = Yes  Stroke/TIA/thromboembolism history --> 2 = Yes  Vascular disease history (prior MI, peripheral artery disease, or aortic plaque) --> 1 = Yes  Diabetes history --> 1 = Yes  - Patient should be anticoagulated pending stable hemoglobin          Hematuria  Blood loss anemia    - Patient with complicated hospital course including significant hematuria leading to blood loss anemia and need for blood transfusion.   - Hg stable at 8.5 for now. Patient has purewick, no signs of gross hematuria. INR 1.1  - type and screen ordered, blood consent signed  - Will keep cbc at daily frequency for now. If Hg with significant drop in the morning, consider increasing frequency and transfuse for Hg<7  - in terms of anticoagulation (see paroxysmal afib), hold for now. If Hg remains stable, consider starting anticoagulation        MSSA bacteremia  - Patient with evidence of MSSA bacteremia on blood cultures taken on 1/20. First vanc trough drawn on 1/23  - Sensitivities show that staph aureus is sensitive to cefazolin and oxacillin. However patient is still on vanc with documented allergy to penicillins. However patient is unclear as to what reaction she gets and daughter is unclear as well  - In addition, it appears that patient would have received full course of antibiotic for bacteremia as no further blood cultures were positive for MSSA after 1/20 cultures  - Unclear if patient still needs to be treated with vanc at all for bacteremia as if therapy was continued throughout whole course, she would  have gotten 7-14 days of treatment already. Patient reports PICC line was placed late in her hospitalization. Will leave in as patient is difficult stick and bacteremia is likely treated.  - Blood cultures repeated, if they remain negative for 24 hours, discontinue vancomycin       Discharge planning issues  Debility  Overweight    - Disposition pending IR and family conversation regarding options for source control of abscess   - Patient will likely need better control of her COPD prior to discharge  - PT/OT ordered      Cholecystitis  - Ct scan done on 1/20 showed gallbladder to be markedly distended with sludge and stones, and with an indistinct wall. There was some pericholecystic inflammation in the posterior part. She had a percutaneous cholecystostomy tube placed on 1/24 for the acute cholecystitis  - Drain still in place  - Will continue cipro flagyl for intraabdominal prophylaxis as patient also has sigmoid colon abscess       Type 2 diabetes mellitus, without long-term current use of insulin  - HA1c 7.3 last October   - Repeat ordered  - Patient was on 6 units of insulin basal plus sliding scale prior to transfer, she does not use insulin at home  - Will keep sliding scale insulin for now. Calculate daily insulin needs, place on basal if greater glucose control is needed; no longer being treated with steroids      Hypothyroid  - Continue home levothyroxine.  - 2/7 thyroid labs show TSH elevated at 32, but T4 wnl of 1.05    Essential hypertension  - Patient takes amlodipine and hydrochlorothiazide at home  - metoprolol started for rate control for new onset afib prior to transfer  - Will resume metoprolol and amlodipine as patient is mildly hypertensive  - Resume hydrochlorothiazide as needed        VTE Risk Mitigation (From admission, onward)         Ordered     Place sequential compression device  Until discontinued      02/08/20 0233     IP VTE HIGH RISK PATIENT  Once      02/07/20 1915                    Davis Mcfadden MD  Department of Hospital Medicine   [facility]

## 2020-02-08 NOTE — ASSESSMENT & PLAN NOTE
Respiratory failure with hypoxia  Respiratory distress    - Patient treated for COPD exacerbation with full course of steroids and breathing treatments prior to transfer  - Patient does not have formal diagnosis of COPD but reports heavy smoking history at a pack a day for many years  - Patient also reports using 3 L O2 at home, but daughter is not sure. Unspecified chronicity of hypoxic respiratory failure  - Will continue breathing treatment q6 and incentive spirometry  - Will follow up vbg as patient has apparent increased work of breathing.  - Patient is not on any inhalers at home, she will likely need rescue inhaler and inhaled corticosteroid at minimum at discharge

## 2020-02-08 NOTE — PROGRESS NOTES
02/07/20 1801   Vital Signs   Temp 99.5 °F (37.5 °C)   Temp src Axillary   Pulse 99   Resp (!) 22   SpO2 (!) 94 %   Flow (L/min) 3   O2 Device (Oxygen Therapy) nasal cannula   BP (!) 144/70   MAP (mmHg) 101   BP Location Left arm   BP Method Automatic   Patient Position Lying   pt arrived to floor.

## 2020-02-08 NOTE — ASSESSMENT & PLAN NOTE
- Patient with evidence of  an abscess of 3.5 x 5 x 5.3 cm insinuated between the sigmoid colon and the dome of the urinary bladder, possibly in the setting of sigmoid diverticulitis  - Not drained by IR at the time as it was deemed unsafe to do so  - Family wanted transfer for IR second opinion. IR consulted call in the morning  - Blood cultures x2 collected, per outside hospital records, blood cultures have been clear since 1/20 ( See MSSA bacteremia)  - Continue cipro flagyl for now, lactate wnl

## 2020-02-08 NOTE — ASSESSMENT & PLAN NOTE
- Patient with evidence of MSSA bacteremia on blood cultures taken on 1/20. First vanc trough drawn on 1/23  - Sensitivities show that staph aureus is sensitive to cefazolin and oxacillin. However patient is still on vanc with documented allergy to penicillins. However patient is unclear as to what reaction she gets and daughter is unclear as well  - In addition, it appears that patient would have received full course of antibiotic for bacteremia as no further blood cultures were positive for MSSA after 1/20 cultures  - Unclear if patient still needs to be treated with vanc at all for bacteremia as if therapy was continued throughout whole course, she would have gotten 7-14 days of treatment already. Patient reports PICC line was placed late in her hospitalization. Will leave in as patient is difficult stick and bacteremia is likely treated.  - Blood cultures repeated, if they remain negative for 24 hours, discontinue vancomycin

## 2020-02-08 NOTE — PLAN OF CARE
Recommendations    1. Once able, initiate Diabetic diet with texture per SLP.    -Add Boost Glucose if PO intake <50%.   2. If unable to start oral diet, recommend enteral nutrition - Glucerna 1.5 at 45mL/hr.    -Will provide 1620kcal, 89g protein, and 820mL fluid. Will monitor.   Goals: Pt to receive nutrition by RD follow-up.

## 2020-02-08 NOTE — ASSESSMENT & PLAN NOTE
- HA1c 7.3 last October   - Repeat ordered  - Patient was on 6 units of insulin basal plus sliding scale prior to transfer, she does not use insulin at home  - Will keep sliding scale insulin for now. Calculate daily insulin needs, place on basal if greater glucose control is needed; no longer being treated with steroids

## 2020-02-08 NOTE — CONSULTS
"  [facility]  Adult Nutrition  Consult Note    SUMMARY     Recommendations    1. Once able, initiate Diabetic diet with texture per SLP.    -Add Boost Glucose if PO intake <50%.   2. If unable to start oral diet, recommend enteral nutrition - Glucerna 1.5 at 45mL/hr.    -Will provide 1620kcal, 89g protein, and 820mL fluid. Will monitor.   Goals: Pt to receive nutrition by RD follow-up.   Nutrition Goal Status: new  Communication of RD Recs: other (comment)(POC)    Reason for Assessment    Reason For Assessment: consult  Diagnosis: other (see comments)(abscess of sigmoid colon)  Relevant Medical History: HTN, DM, s/p  shunt  General Information Comments: Unable to speak with pt during visit. Noted pt was admitted at and OSH over last few weeks. Pt was intubated from 1/20-1/31. Noted that pt stated that she was able to eat on 2/6, but also noted that pt may have been on TF and needing SLP eval. Pt currently NPO. No wt hx in chart. Will monitor for malnutrition.   Nutrition Discharge Planning: Unclear at this time.     Nutrition/Diet History    Spiritual, Cultural Beliefs, Evangelical Practices, Values that Affect Care: other (see comments)(MANUEL)  Factors Affecting Nutritional Intake: NPO    Anthropometrics    Temp: 98.5 °F (36.9 °C)  Height: 5' 5" (165.1 cm)  Height (inches): 65 in  Weight: 77.6 kg (171 lb 1.2 oz)  Weight (lb): 171.08 lb  Ideal Body Weight (IBW), Female: 125 lb  % Ideal Body Weight, Female (lb): 136.86 %  BMI (Calculated): 28.5  BMI Grade: 25 - 29.9 - overweight     Lab/Procedures/Meds    Pertinent Labs Reviewed: reviewed  Pertinent Labs Comments: Glu 202, Ca 8  Pertinent Medications Reviewed: reviewed    Estimated/Assessed Needs    Weight Used For Calorie Calculations: 77.6 kg (171 lb 1.2 oz)  Energy Calorie Requirements (kcal): 1607  Energy Need Method: [place]-St Jeor(1.25 PAL)  Protein Requirements: 77-93g (1-1.2g/kg)  Weight Used For Protein Calculations: 77.6 kg (171 lb 1.2 " oz)  Fluid Requirements (mL): Per MD  RDA Method (mL): 1607     Nutrition Prescription Ordered    Current Diet Order: NPO    Evaluation of Received Nutrient/Fluid Intake     % Intake of Estimated Energy Needs: 0 - 25 %  % Meal Intake: NPO    Nutrition Risk    Level of Risk/Frequency of Follow-up: high(2X/week)     Assessment and Plan    Nutrition Problem  Inadequate energy intake    Related to (etiology):   Decreased ability to consume adequate energy    Signs and Symptoms (as evidenced by):   NPO status, no form of nutrition at this time    Interventions(treatment strategy):  Collaboration of nutrition care with other providers    Nutrition Diagnosis Status:   New    Monitor and Evaluation    Food and Nutrient Intake: energy intake  Food and Nutrient Adminstration: diet order  Anthropometric Measurements: weight, weight change  Biochemical Data, Medical Tests and Procedures: other (specify)(All labs)  Nutrition-Focused Physical Findings: overall appearance     Nutrition Follow-Up    RD Follow-up?: Yes

## 2020-02-08 NOTE — ASSESSMENT & PLAN NOTE
Debility  Overweight    - Disposition pending IR and family conversation regarding options for source control of abscess   - Patient will likely need better control of her COPD prior to discharge  - PT/OT ordered

## 2020-02-08 NOTE — ASSESSMENT & PLAN NOTE
Blood loss anemia    - Patient with complicated hospital course including significant hematuria leading to blood loss anemia and need for blood transfusion.   - Hg stable at 8.5 for now. Patient has purewick, no signs of gross hematuria. INR 1.1  - type and screen ordered, blood consent signed  - Will keep cbc at daily frequency for now. If Hg with significant drop in the morning, consider increasing frequency and transfuse for Hg<7  - in terms of anticoagulation (see paroxysmal afib), hold for now. If Hg remains stable, consider starting anticoagulation

## 2020-02-08 NOTE — PLAN OF CARE
Problem: Adult Inpatient Plan of Care  Goal: Plan of Care Review  Outcome: Ongoing, Progressing  Goal: Patient-Specific Goal (Individualization)  Outcome: Ongoing, Progressing  Goal: Absence of Hospital-Acquired Illness or Injury  Outcome: Ongoing, Progressing  Goal: Optimal Comfort and Wellbeing  Outcome: Ongoing, Progressing  Goal: Readiness for Transition of Care  Outcome: Ongoing, Progressing  Goal: Rounds/Family Conference  Outcome: Ongoing, Progressing     Problem: Diabetes Comorbidity  Goal: Blood Glucose Level Within Desired Range  Outcome: Ongoing, Progressing     Problem: Infection  Goal: Infection Symptom Resolution  Outcome: Ongoing, Progressing

## 2020-02-08 NOTE — PT/OT/SLP EVAL
Occupational Therapy   Evaluation    Name: Jeni Chacon  MRN: [id number]  Admitting Diagnosis:  Abscess of sigmoid colon      Recommendations:     Discharge Recommendations: nursing facility, skilled  Discharge Equipment Recommendations:  (TBD)  Barriers to discharge:   None    Assessment:     Jeni Chacon is a 74 y.o. female with a medical diagnosis of Abscess of sigmoid colon.  She presents with the following erformance deficits affecting function: weakness, impaired functional mobilty, impaired cognition, decreased safety awareness, impaired balance, impaired self care skills.  Patient tolerated session fair, was limited due to returning self to supine and presented with impaird cognition and command follow. Patient required maximal assistance for bed mobility and to sit edge of bed, consistent cues through out session. Due to patient being a poor historian, therapy to continue to assess functional mobility and participation, would benefit from placement in skilled nursing facility and continued acute OT services at this time.     Rehab Prognosis: Fair; patient would benefit from acute skilled OT services to address these deficits and reach maximum level of function.       Plan:     Patient to be seen three times a week   to address the above listed problems via self-care/home management, therapeutic activities, therapeutic exercises  · Plan of Care Expires:  2/29/20  · Plan of Care Reviewed with: patient    Subjective     Chief Complaint: Patient with impaired cognition, no complaints or outward signs of pain    Occupational Profile:  Living Environment: Unknown  Previous level of function: Unknown  Equipment Used at Home:  (Unable to determine, patient poor historian)    Pain/Comfort:  · Pain Rating 1: 0/10  · Pain Rating Post-Intervention 1: 0/10    Patients cultural, spiritual, Taoism conflicts given the current situation: no    Objective:     Communicated with: [place] prior to session.  Patient found supine  with telemetry upon OT entry to room.    General Precautions: Standard, aspiration, fall   Orthopedic Precautions:N/A   Braces: N/A     Occupational Performance:    Bed Mobility:    · Patient completed Rolling/Turning to Right with maximal assistance  · Patient completed Scooting/Bridging with maximal assistance  · Patient completed Supine to Sit with maximal assistance  · Patient completed Sit to Supine with maximal assistance    Functional Mobility/Transfers:  · NT- Patient maximal assistance to sit edge of bed, attempting to return self to supine    Activities of Daily Living:  · Grooming: minimum assistance in supine with HOB elevated to wash face, cues for thoroughness and task completion  · Upper Body Dressing: minimum assistance in supine with HOB elevated, cues for placement of BUE's  · Lower Body Dressing: dependence      Cognitive/Visual Perceptual:  Cognitive/Psychosocial Skills:     -       Oriented to: Person   -       Follows Commands/attention:Inattentive and intermittently followed one-step command for GH task with maximal cueing  -       Communication: Mumbled speech, not responding appropriately to questions  -       Safety awareness/insight to disability: impaired   -       Mood/Affect/Coping skills/emotional control: Lethargic    Physical Exam:  Postural examination/scapula alignment:    -       Rounded shoulders  -       Forward head  Upper Extremity Range of Motion:     -       Right Upper Extremity: Deficits: unable to formally assess due to impaired cognition, however patient unable to complete shoulder flexion, was able to manage mouth swab  -       Left Upper Extremity: [facility]  Upper Extremity Strength: Unable to formally assess due to impaired cognition   Strength: [facility]'s    AMPAC 6 Click ADL:  AMPAC Total Score: 6    Treatment & Education:  -Evaluation completed, plan of care established.  -Patient educated on roles/goals of OT and POC.  -White board updated.  -PatientEducation:    re-oriented through out session.    Patient left supine with all lines intact, call button in reach and nsg notified and [employer] called    GOALS:   Multidisciplinary Problems     Occupational Therapy Goals        Problem: Occupational Therapy Goal    Goal Priority Disciplines Outcome Interventions   Occupational Therapy Goal     OT, PT/OT Ongoing, Progressing    Description:  Pt will complete GH tasks with stand by assistance  Pt will perform bed mobility with stand by assist  Pt will complete UB dressing with contact guard assist  Pt will follow simple commands 2/3 trials for GH task  Pt will sit edge of bed for >10 minutes for functional task with contact guard assist                    History:     Past Medical History:   Diagnosis Date    Brain aneurysm     Cancer     thyroid    Diabetes mellitus     Diabetic neuropathy     Hypertension     Hypothyroid     Stroke     Tobacco abuse        Past Surgical History:   Procedure Laterality Date    Breast screening      FETOSCOPIC LASER OF POSTERIOR URETHRAL VALVE W/ SHUNT PLACEMENT      OTHER SURGICAL HISTORY      stent in brain     THYROID SURGERY      TUBAL LIGATION         Time Tracking:     OT Date of Treatment: 02/08/20  OT Start Time: 0957  OT Stop Time: 1007  OT Total Time (min): 10 min    Billable Minutes:Evaluation 10 minutes    Belkis Carter OT  2/8/2020

## 2020-02-08 NOTE — ASSESSMENT & PLAN NOTE
- Ct scan done on 1/20 showed gallbladder to be markedly distended with sludge and stones, and with an indistinct wall. There was some pericholecystic inflammation in the posterior part. She had a percutaneous cholecystostomy tube placed on 1/24 for the acute cholecystitis  - Drain still in place  - Will continue cipro flagyl for intraabdominal prophylaxis as patient also has sigmoid colon abscess

## 2020-02-08 NOTE — ASSESSMENT & PLAN NOTE
- Patient takes amlodipine and hydrochlorothiazide at home  - metoprolol started for rate control for new onset afib prior to transfer  - Will resume metoprolol and amlodipine as patient is mildly hypertensive  - Resume hydrochlorothiazide as needed

## 2020-02-08 NOTE — SUBJECTIVE & OBJECTIVE
Past Medical History:   Diagnosis Date    Brain aneurysm     Cancer     thyroid    Diabetes mellitus     Diabetic neuropathy     Hypertension     Hypothyroid     Stroke     Tobacco abuse        Past Surgical History:   Procedure Laterality Date    Breast screening      FETOSCOPIC LASER OF POSTERIOR URETHRAL VALVE W/ SHUNT PLACEMENT      OTHER SURGICAL HISTORY      stent in brain     THYROID SURGERY      TUBAL LIGATION         Review of patient's allergies indicates:   Allergen Reactions    Penicillins        Current Facility-Administered Medications on File Prior to Encounter   Medication    [DISCONTINUED] albuterol sulfate nebulizer solution    [DISCONTINUED] GENERIC EXTERNAL MEDICATION    [DISCONTINUED] insulin detemir U-100 injection     Current Outpatient Medications on File Prior to Encounter   Medication Sig    amLODIPine (NORVASC) 10 MG tablet Take 10 mg by mouth once daily.    glimepiride (AMARYL) 4 MG tablet Take 4 mg by mouth before breakfast.    hydrALAZINE (APRESOLINE) 10 MG tablet Take 10 mg by mouth every 12 (twelve) hours.    hydroCHLOROthiazide (HYDRODIURIL) 12.5 MG Tab Take 12.5 mg by mouth once daily.    levothyroxine (SYNTHROID) 200 MCG tablet Take 200 mcg by mouth before breakfast.    metFORMIN (GLUCOPHAGE) 500 MG tablet Take 500 mg by mouth 3 (three) times daily.     Family History     Problem Relation (Age of Onset)    Diabetes Mother    HIV Son    Heart disease Mother, Son    Hypertension Mother        Tobacco Use    Smoking status: Former Smoker     Packs/day: 1.00     Types: Cigarettes     Last attempt to quit: 2019     Years since quittin.6    Tobacco comment: Many years   Substance and Sexual Activity    Alcohol use: No    Drug use: No    Sexual activity: Not on file     Review of Systems   Unable to perform ROS: Mental status change   * Patient denies all symptoms despite appearing to have increased work of breathing and looking generally  uncomfortable  Objective:     Vital Signs (Most Recent):  Temp: 99.5 °F (37.5 °C) (02/07/20 1801)  Pulse: 99 (02/07/20 1801)  Resp: (!) 22 (02/07/20 1801)  BP: (!) 144/70 (02/07/20 1801)  SpO2: (!) 94 % (02/07/20 1801) Vital Signs (24h Range):  Temp:  [99.5 °F (37.5 °C)] 99.5 °F (37.5 °C)  Pulse:  [90-99] 99  Resp:  [22-26] 22  SpO2:  [94 %-97 %] 94 %  BP: (105-144)/(67-75) 144/70     Weight: 77.6 kg (171 lb 1.2 oz)  Body mass index is 28.47 kg/m².    Physical Exam   Constitutional: She is oriented to person, place, and time. She appears well-developed and well-nourished. She appears distressed.   HENT:   Head: Normocephalic and atraumatic.   Mouth/Throat: No oropharyngeal exudate (however, large amount of mucus).   Lips appear swollen, unclear baseline  Mouth mucosa appears dry  NG tube in place   Eyes: Pupils are equal, round, and reactive to light. No scleral icterus.   Neck: Normal range of motion. Neck supple. No JVD present.   Cardiovascular: Regular rhythm, normal heart sounds and intact distal pulses.   No murmur heard.  Tachycardic   Pulmonary/Chest: Effort normal. She has rales (bilateral lower lung fields).   NC 3L   Abdominal: Soft. Bowel sounds are normal. There is no tenderness. There is no guarding.   percutaneous drain noted. No erythema or tenderness   Genitourinary:   Genitourinary Comments: Purewick in place   Musculoskeletal: Normal range of motion. She exhibits no edema (legs appear dry), tenderness or deformity.   Neurological: She is alert and oriented to person, place, and time. A sensory deficit (pre-existing neuropathy in lower extremtiy) is present.   Unable to do full cranial nerve exam as patient would not participate   Skin: Skin is warm and dry.   Psychiatric:   Exhibits odd behavior. Unknown baseline. Patient's daughter says that patient has not acted strange prior to initial admission   Vitals reviewed.        CRANIAL NERVES     CN III, IV, VI   Pupils are equal, round, and reactive  to light.       Significant Labs: All pertinent labs within the past 24 hours have been reviewed.    Significant Imaging: I have reviewed all pertinent imaging results/findings within the past 24 hours.

## 2020-02-08 NOTE — PROGRESS NOTES
Pharmacokinetic Initial Assessment: IV Vancomycin    Assessment:  · Refer to [facility] Clinical Summary  · Previously on Vancomycin 500 mg q12h on 02/07/20   · Vancomycin Trough on 02/07/20 @0608 = 10.5  · Desired empiric serum trough concentration is 15 to 20 mcg/mL   · Estimated Creatinine Clearance: 47.6 mL/min (based on SCr of 1.1 mg/dL).   · Serum creatinine increased from 0.95 mg/dL to 1.1 mg/dL in 12 hours.      Plan:  · Will start Vancomycin 750 mg q12h on 02/07/20 @ 2130  · Draw vancomycin trough level 30 min prior to fourth dose on 02/09/20 at approximately 0900  Pharmacy will continue to follow and monitor vancomycin.      Please contact pharmacy at extension [phone] with any questions regarding this assessment.     Thank you for the consult,   Oscar Pandey       Patient brief summary:  Jeni Chacon is a 74 y.o. female initiated on antimicrobial therapy with IV Vancomycin for treatment of suspected bacteremia    Drug Allergies:   Review of patient's allergies indicates:   Allergen Reactions    Penicillins        Actual Body Weight:   77.6 kg    Renal Function:   Estimated Creatinine Clearance: 47.6 mL/min (based on SCr of 1.1 mg/dL).,     Dialysis Method (if applicable):  N/A    CBC (last 72 hours):  Recent Labs   Lab Result Units 02/07/20  1847   WBC K/uL 9.37   Hemoglobin g/dL 8.2*   Hematocrit % 25.9*   Platelets K/uL 456*   Gran% % 64.6   Lymph% % 18.5   Mono% % 12.2   Eosinophil% % 3.8   Basophil% % 0.3   Differential Method  Automated       Metabolic Panel (last 72 hours):  Recent Labs   Lab Result Units 02/07/20  1847   Sodium mmol/L 143   Potassium mmol/L 4.0   Chloride mmol/L 106   CO2 mmol/L 27   Glucose mg/dL 191*   BUN, Bld mg/dL 11   Creatinine mg/dL 1.1   Albumin g/dL 2.1*   Total Bilirubin mg/dL 0.4   Alkaline Phosphatase U/L 104   AST U/L 14   ALT U/L 9*   Magnesium mg/dL 1.7   Phosphorus mg/dL 2.6*       Drug levels (last 3 results):  No results for input(s): VANCOMYCINRA, VANCOMYCINPE,  VANCOMYCINTR in the last 72 hours.    Microbiologic Results:  Microbiology Results (last 7 days)     Procedure Component Value Units Date/Time    Blood culture (site 2) [[id number]] Collected:  02/07/20 1847    Order Status:  Sent Specimen:  Blood Updated:  02/07/20 1902    Blood culture (site 1) [[id number]] Collected:  02/07/20 1847    Order Status:  Sent Specimen:  Blood Updated:  02/07/20 1902

## 2020-02-08 NOTE — PT/OT/SLP EVAL
"Speech Language Pathology Evaluation  Bedside Swallow    Patient Name:  Jeni Chacon   MRN:  [id number]  Admitting Diagnosis: Abscess of sigmoid colon    Recommendations:                 General Recommendations:  Dysphagia therapy  Diet recommendations:  NPO, NPO   Aspiration Precautions: Frequent oral care, Ice chips sparingly, Meds crushed in puree and Strict aspiration precautions   General Precautions: Standard, aspiration, fall  Communication strategies:  none    History:     Past Medical History:   Diagnosis Date    Brain aneurysm     Cancer     thyroid    Diabetes mellitus     Diabetic neuropathy     Hypertension     Hypothyroid     Stroke     Tobacco abuse        Past Surgical History:   Procedure Laterality Date    Breast screening      FETOSCOPIC LASER OF POSTERIOR URETHRAL VALVE W/ SHUNT PLACEMENT      OTHER SURGICAL HISTORY      stent in brain     THYROID SURGERY      TUBAL LIGATION         Social History:  unknown.    Prior Intubation HX:  None this admission    Modified Barium Swallow: none this admission    Chest X-Rays: 2/7/20:  Hypoventilatory lung changes with patchy increased attenuation within the left lower lung zone which could relate to atelectasis or consolidation.  No pneumothorax or large pleural effusion.    Prior diet: unknown    Occupation/hobbies/homemaking: none expressed.    Subjective     "Is that water?  Can I have some water?"  Pt asking SLP upon presentation  Patient goals: to have water     Pain/Comfort:  · Pain Rating 1: 0/10  · Pain Rating Post-Intervention 1: 0/10    Objective:     Oral Musculature Evaluation  · Oral Musculature: general weakness  · Dentition: present and adequate  · Secretion Management: adequate  · Mucosal Quality: dry  · Mandibular Strength and Mobility: WFL  · Oral Labial Strength and Mobility: WFL  · Lingual Strength and Mobility: impaired strength  · Volitional Cough: adequate  · Volitional Swallow: timely  · Voice Prior to PO Intake: " clear    Bedside Swallow Eval:   Consistencies Assessed:  · Thin liquids single ice chips x3, tsp sips x3, cup sip x1  · Puree tsp bites x4     Oral Phase:   · increased work of breathing with po trials  · Dry mouth  · Leakage, mouth breathing    Pharyngeal Phase:   · Coughing/choking: immediate following cup sip thin liquids  · decreased hyolaryngeal excursion to palpation: all trials  · delayed swallow initiation:  All trials  · Throat clearing x1 following puree trial # 3/4 only  · Pt tolerated all ice chip trials well with no signs of airway threat    Compensatory Strategies  · None    Treatment: Education was provided to pt re: SLP role, eval results, need for npo status, s/s of aspiration, risk of aspiration, comfort recommendations and SLP POC.  She indicated fair understanding.     Assessment:     Jeni Chacon is a 74 y.o. female with an SLP diagnosis of Dysphagia.  She presents with moderate oropharyngeal dysphagia at this time characterized by reduced oral bolus control and delayed swallow reflex initiation.  Pt would benefit from ongoing Skilled Speech services for monitoring of swallow function and to determine readiness to resume po intake.    Goals:   Multidisciplinary Problems     SLP Goals        Problem: SLP Goal    Goal Priority Disciplines Outcome   SLP Goal     SLP Ongoing, Progressing   Description:  Speech Language Pathology Goals  Goals expected to be met by 2/15/20    1.  Pt will participate in ongoing swallow assessment to determine readiness to resume po intake and the least restrictive diet.                      Plan:     · Patient to be seen:  4 x/week   · Plan of Care expires:  03/08/20  · Plan of Care reviewed with:  patient   · SLP Follow-Up:  Yes       Discharge recommendations:  nursing facility, skilled   Barriers to Discharge:  Inaccessible Home Environment    Time Tracking:     SLP Treatment Date:   02/08/20  Speech Start Time:  1250  Speech Stop Time:  1306     Speech Total Time  (min):  16 min    Billable Minutes: Eval Swallow and Oral Function 16    Mariana Vega CCC-SLP  02/08/2020

## 2020-02-08 NOTE — PLAN OF CARE
Clinical Swallow Evaluation completed.  REC:  pt remain npo at this time with alternative means of nutrition/hydration via NG.  Pt may have oral meds crushed in puree if needed.  Pt would benefit from ice chips sparingly for comfort with RN Supervision.  Recs reviewed with RN.  Carson ROSEN per POC.    Mariana Vega, CCC-SLP  2/8/2020      Problem: SLP Goal  Goal: SLP Goal  Description  Speech Language Pathology Goals  Goals expected to be met by 2/15/20    1.  Pt will participate in ongoing swallow assessment to determine readiness to resume po intake and the least restrictive diet.     Outcome: Ongoing, Progressing

## 2020-02-08 NOTE — CONSULTS
Updated imaging shows decreased size of the sigmoid collection when compared to prior exam.  Given small size and interposed bowel and bladder, percutaneous drainage is deferred at this time.    Additional findings from the patient's CT are detailed in the dictated report. Please reconsult IR if there is any change or if we can be of further assistance.

## 2020-02-08 NOTE — PT/OT/SLP EVAL
Physical Therapy Evaluation    Patient Name:  Jeni Chacon   MRN:  [id number]    Recommendations:     Discharge Recommendations:  nursing facility, skilled   Discharge Equipment Recommendations: (TBD)   Barriers to discharge: decreased functional mobility requiring increased assist      Assessment:     Jeni Chacon is a 74 y.o. female admitted with a medical diagnosis of Abscess of sigmoid colon.  She presents with the following impairments/functional limitations:  weakness, impaired functional mobilty, impaired cognition, decreased safety awareness, impaired endurance, gait instability, impaired balance, impaired self care skills. Pt presenting with cognitive and functional mobility impairments on this date. Pt is a poor historian and no family present to provided PLOF, living environment, or DME owned. Pt will be placed on a trial w/acute therapy to continue assessing functional mobility and obtain PLOF. Pt would benefit from continued skilled acute PT 3x/wk to improve functional mobility.  Recommending pt receive PT services in SNF setting following discharge from hospital once medically cleared.     Rehab Prognosis: Fair; patient would benefit from acute skilled PT services to address these deficits and reach maximum level of function.    Recent Surgery: * No surgery found *      Plan:     During this hospitalization, patient to be seen 3 x/week to address the identified rehab impairments via gait training, therapeutic activities, therapeutic exercises, neuromuscular re-education and progress toward the following goals:    · Plan of Care Expires:  03/08/20    Subjective     Pain/Comfort:  · Pain Rating 1: 0/10    Patients cultural, spiritual, Bahai conflicts given the current situation: no    Living Environment:  Unable to determine on this date  Prior to admission, patients level of function was unable to determine on this date.  Equipment used at home: (unable to determine ).  DME owned (not currently  used): none.  Upon discharge, patient will have assistance from TBD.    Objective:     Communicated with NSG prior to session.  Patient found right sidelying with    upon PT entry to room.    General Precautions: Standard, fall   Orthopedic Precautions:N/A   Braces: N/A     Exams:  · Cognitive Exam:  Patient is oriented to Person    Functional Mobility:  · Bed Mobility:     · Rolling Right: maximal assistance  · Scooting: maximal assistance  · Supine to Sit: maximal assistance  · Sit to Supine: maximal assistance  · Transfers:     · Sit to Stand: unsafe as pt unable to follow commands   · Balance: Sitting: Mod A initially progressing to Min A      Therapeutic Activities and Exercises:   - Pt educated on:   -PT roles, expectations, and POC    -Safety with mobility   -Benefits of OOB activities to increase strength and functional mobility    -Performing ther ex for increasing LE ROM and strength   -Discharge recommendations     AM-PAC 6 CLICK MOBILITY  Total Score:8     Patient left supine with all lines intact and call button in reach.    GOALS:   Multidisciplinary Problems     Physical Therapy Goals        Problem: Physical Therapy Goal    Goal Priority Disciplines Outcome Goal Variances Interventions   Physical Therapy Goal     PT, PT/OT Ongoing, Progressing     Description:  Goals to be met by: 3/8/2020    Patient will increase functional independence with mobility by performin. Supine to sit with MInimal Assistance  2. Sit to supine with MInimal Assistance  3. Sit to stand transfer with Moderate Assistance  4. Gait  x 15 feet with Minimal Assistance using LRAD  5. Lower extremity exercise program x15 reps per handout, with independence                     History:     Past Medical History:   Diagnosis Date    Brain aneurysm     Cancer     thyroid    Diabetes mellitus     Diabetic neuropathy     Hypertension     Hypothyroid     Stroke     Tobacco abuse        Past Surgical History:   Procedure  Laterality Date    Breast screening      FETOSCOPIC LASER OF POSTERIOR URETHRAL VALVE W/ SHUNT PLACEMENT      OTHER SURGICAL HISTORY      stent in brain     THYROID SURGERY      TUBAL LIGATION         Time Tracking:     PT Received On: 02/08/20  PT Start Time: 0957     PT Stop Time: 1007  PT Total Time (min): 10 min     Billable Minutes: Evaluation 10      Rafal Gutierres, PT  02/08/2020

## 2020-02-08 NOTE — ASSESSMENT & PLAN NOTE
- Patient with reports of new onset paroxysmal afib during course of hospital stay  - Appears to be in sinus rhythm at the time of initial exam  - Continue metoprolol. Patient was getting 25 mg QID prior to transfer. Unclear if this was out of caution for hypotension or if increased frequency was needed for continued rate control. Will keep at this dose for now as patient may have received some doses prior to transfer. Consider switching to metoprolol succinate in the morning.   - Patient not anticoagulated at this time due to significant hematuria prior to transfer  * [id number] Score for Atrial Fibrillation Stroke Ris  RESULT SUMMARY:  7 points  Stroke risk was 11.2% per year in >90,000 patients (the [place] Atrial Fibrillation Cohort Study) and 15.7% risk of stroke/TIA/systemic embolism  INPUTS:  Age --> 1 = 65-74  Sex --> 1 = Female  CHF history --> 0 = No  Hypertension history --> 1 = Yes  Stroke/TIA/thromboembolism history --> 2 = Yes  Vascular disease history (prior MI, peripheral artery disease, or aortic plaque) --> 1 = Yes  Diabetes history --> 1 = Yes  - Patient should be anticoagulated pending stable hemoglobin

## 2020-02-09 LAB
ALBUMIN SERPL BCP-MCNC: 2 G/DL (ref 3.5–5.2)
ALP SERPL-CCNC: 75 U/L (ref 55–135)
ALT SERPL W/O P-5'-P-CCNC: 8 U/L (ref 10–44)
ANION GAP SERPL CALC-SCNC: 10 MMOL/L (ref 8–16)
AST SERPL-CCNC: 13 U/L (ref 10–40)
BASOPHILS # BLD AUTO: 0.02 K/UL (ref 0–0.2)
BASOPHILS NFR BLD: 0.3 % (ref 0–1.9)
BILIRUB SERPL-MCNC: 0.3 MG/DL (ref 0.1–1)
BUN SERPL-MCNC: 7 MG/DL (ref 8–23)
CALCIUM SERPL-MCNC: 8.1 MG/DL (ref 8.7–10.5)
CHLORIDE SERPL-SCNC: 108 MMOL/L (ref 95–110)
CO2 SERPL-SCNC: 28 MMOL/L (ref 23–29)
CREAT SERPL-MCNC: 1 MG/DL (ref 0.5–1.4)
DIFFERENTIAL METHOD: ABNORMAL
EOSINOPHIL # BLD AUTO: 0.3 K/UL (ref 0–0.5)
EOSINOPHIL NFR BLD: 4.5 % (ref 0–8)
ERYTHROCYTE [DISTWIDTH] IN BLOOD BY AUTOMATED COUNT: 19.5 % (ref 11.5–14.5)
EST. GFR  (AFRICAN AMERICAN): >60 ML/MIN/1.73 M^2
EST. GFR  (NON AFRICAN AMERICAN): 56.4 ML/MIN/1.73 M^2
GLUCOSE SERPL-MCNC: 198 MG/DL (ref 70–110)
HCT VFR BLD AUTO: 26.3 % (ref 37–48.5)
HGB BLD-MCNC: 8.1 G/DL (ref 12–16)
IMM GRANULOCYTES # BLD AUTO: 0.04 K/UL (ref 0–0.04)
IMM GRANULOCYTES NFR BLD AUTO: 0.5 % (ref 0–0.5)
LYMPHOCYTES # BLD AUTO: 1 K/UL (ref 1–4.8)
LYMPHOCYTES NFR BLD: 13.4 % (ref 18–48)
MAGNESIUM SERPL-MCNC: 1.7 MG/DL (ref 1.6–2.6)
MCH RBC QN AUTO: 27.8 PG (ref 27–31)
MCHC RBC AUTO-ENTMCNC: 30.8 G/DL (ref 32–36)
MCV RBC AUTO: 90 FL (ref 82–98)
MONOCYTES # BLD AUTO: 1.1 K/UL (ref 0.3–1)
MONOCYTES NFR BLD: 14.9 % (ref 4–15)
NEUTROPHILS # BLD AUTO: 4.9 K/UL (ref 1.8–7.7)
NEUTROPHILS NFR BLD: 66.4 % (ref 38–73)
NRBC BLD-RTO: 0 /100 WBC
PHOSPHATE SERPL-MCNC: 3.9 MG/DL (ref 2.7–4.5)
PLATELET # BLD AUTO: 439 K/UL (ref 150–350)
PMV BLD AUTO: 9.6 FL (ref 9.2–12.9)
POCT GLUCOSE: 194 MG/DL (ref 70–110)
POTASSIUM SERPL-SCNC: 3.3 MMOL/L (ref 3.5–5.1)
PROT SERPL-MCNC: 7.4 G/DL (ref 6–8.4)
RBC # BLD AUTO: 2.91 M/UL (ref 4–5.4)
SODIUM SERPL-SCNC: 146 MMOL/L (ref 136–145)
VANCOMYCIN TROUGH SERPL-MCNC: 20.7 UG/ML (ref 10–22)
WBC # BLD AUTO: 7.37 K/UL (ref 3.9–12.7)

## 2020-02-09 PROCEDURE — 11000001 HC ACUTE MED/SURG PRIVATE ROOM

## 2020-02-09 PROCEDURE — 94640 AIRWAY INHALATION TREATMENT: CPT

## 2020-02-09 PROCEDURE — 85025 COMPLETE CBC W/AUTO DIFF WBC: CPT

## 2020-02-09 PROCEDURE — 87798 DETECT AGENT NOS DNA AMP: CPT

## 2020-02-09 PROCEDURE — 25000003 PHARM REV CODE 250: Performed by: STUDENT IN AN ORGANIZED HEALTH CARE EDUCATION/TRAINING PROGRAM

## 2020-02-09 PROCEDURE — 63600175 PHARM REV CODE 636 W HCPCS: Performed by: INTERNAL MEDICINE

## 2020-02-09 PROCEDURE — 25000242 PHARM REV CODE 250 ALT 637 W/ HCPCS: Performed by: STUDENT IN AN ORGANIZED HEALTH CARE EDUCATION/TRAINING PROGRAM

## 2020-02-09 PROCEDURE — S0030 INJECTION, METRONIDAZOLE: HCPCS | Performed by: STUDENT IN AN ORGANIZED HEALTH CARE EDUCATION/TRAINING PROGRAM

## 2020-02-09 PROCEDURE — 25000242 PHARM REV CODE 250 ALT 637 W/ HCPCS: Performed by: INTERNAL MEDICINE

## 2020-02-09 PROCEDURE — 99232 SBSQ HOSP IP/OBS MODERATE 35: CPT | Mod: ,,, | Performed by: INTERNAL MEDICINE

## 2020-02-09 PROCEDURE — 99900035 HC TECH TIME PER 15 MIN (STAT)

## 2020-02-09 PROCEDURE — 63600175 PHARM REV CODE 636 W HCPCS: Performed by: STUDENT IN AN ORGANIZED HEALTH CARE EDUCATION/TRAINING PROGRAM

## 2020-02-09 PROCEDURE — 99233 SBSQ HOSP IP/OBS HIGH 50: CPT | Mod: ,,, | Performed by: PHYSICIAN ASSISTANT

## 2020-02-09 PROCEDURE — 80202 ASSAY OF VANCOMYCIN: CPT

## 2020-02-09 PROCEDURE — 94761 N-INVAS EAR/PLS OXIMETRY MLT: CPT

## 2020-02-09 PROCEDURE — 80053 COMPREHEN METABOLIC PANEL: CPT

## 2020-02-09 PROCEDURE — 83735 ASSAY OF MAGNESIUM: CPT

## 2020-02-09 PROCEDURE — 99232 PR SUBSEQUENT HOSPITAL CARE,LEVL II: ICD-10-PCS | Mod: ,,, | Performed by: INTERNAL MEDICINE

## 2020-02-09 PROCEDURE — 99233 PR SUBSEQUENT HOSPITAL CARE,LEVL III: ICD-10-PCS | Mod: ,,, | Performed by: PHYSICIAN ASSISTANT

## 2020-02-09 PROCEDURE — 84100 ASSAY OF PHOSPHORUS: CPT

## 2020-02-09 PROCEDURE — 27000221 HC OXYGEN, UP TO 24 HOURS

## 2020-02-09 PROCEDURE — 36415 COLL VENOUS BLD VENIPUNCTURE: CPT

## 2020-02-09 RX ORDER — MAGNESIUM SULFATE HEPTAHYDRATE 40 MG/ML
2 INJECTION, SOLUTION INTRAVENOUS ONCE
Status: COMPLETED | OUTPATIENT
Start: 2020-02-09 | End: 2020-02-09

## 2020-02-09 RX ORDER — POTASSIUM CHLORIDE 20 MEQ/1
40 TABLET, EXTENDED RELEASE ORAL ONCE
Status: COMPLETED | OUTPATIENT
Start: 2020-02-09 | End: 2020-02-09

## 2020-02-09 RX ORDER — IPRATROPIUM BROMIDE AND ALBUTEROL SULFATE 2.5; .5 MG/3ML; MG/3ML
3 SOLUTION RESPIRATORY (INHALATION) EVERY 6 HOURS PRN
Status: DISCONTINUED | OUTPATIENT
Start: 2020-02-09 | End: 2020-02-25 | Stop reason: HOSPADM

## 2020-02-09 RX ADMIN — CIPROFLOXACIN 400 MG: 2 INJECTION, SOLUTION INTRAVENOUS at 11:02

## 2020-02-09 RX ADMIN — VANCOMYCIN HYDROCHLORIDE 1250 MG: 1.25 INJECTION, POWDER, LYOPHILIZED, FOR SOLUTION INTRAVENOUS at 04:02

## 2020-02-09 RX ADMIN — AMLODIPINE BESYLATE 10 MG: 10 TABLET ORAL at 09:02

## 2020-02-09 RX ADMIN — MAGNESIUM SULFATE IN WATER 2 G: 40 INJECTION, SOLUTION INTRAVENOUS at 11:02

## 2020-02-09 RX ADMIN — POTASSIUM CHLORIDE 40 MEQ: 1500 TABLET, EXTENDED RELEASE ORAL at 09:02

## 2020-02-09 RX ADMIN — IPRATROPIUM BROMIDE AND ALBUTEROL SULFATE 3 ML: .5; 3 SOLUTION RESPIRATORY (INHALATION) at 09:02

## 2020-02-09 RX ADMIN — METRONIDAZOLE 500 MG: 500 INJECTION, SOLUTION INTRAVENOUS at 02:02

## 2020-02-09 RX ADMIN — METOPROLOL TARTRATE 25 MG: 25 TABLET ORAL at 04:02

## 2020-02-09 RX ADMIN — CIPROFLOXACIN 400 MG: 2 INJECTION, SOLUTION INTRAVENOUS at 09:02

## 2020-02-09 RX ADMIN — METOPROLOL TARTRATE 25 MG: 25 TABLET ORAL at 09:02

## 2020-02-09 RX ADMIN — DEXTROSE 500 ML: 5 SOLUTION INTRAVENOUS at 02:02

## 2020-02-09 RX ADMIN — LEVOTHYROXINE SODIUM 200 MCG: 100 TABLET ORAL at 05:02

## 2020-02-09 RX ADMIN — METRONIDAZOLE 500 MG: 500 INJECTION, SOLUTION INTRAVENOUS at 05:02

## 2020-02-09 RX ADMIN — IPRATROPIUM BROMIDE AND ALBUTEROL SULFATE 3 ML: .5; 3 SOLUTION RESPIRATORY (INHALATION) at 11:02

## 2020-02-09 RX ADMIN — METRONIDAZOLE 500 MG: 500 INJECTION, SOLUTION INTRAVENOUS at 09:02

## 2020-02-09 RX ADMIN — METOPROLOL TARTRATE 25 MG: 25 TABLET ORAL at 10:02

## 2020-02-09 RX ADMIN — IPRATROPIUM BROMIDE AND ALBUTEROL SULFATE 3 ML: .5; 3 SOLUTION RESPIRATORY (INHALATION) at 12:02

## 2020-02-09 NOTE — SUBJECTIVE & OBJECTIVE
Interval History: She is alert but O x 1 to person.   She does want to eat but denies any other symptoms, no abdominal pain, had BM    Review of Systems   Constitutional: Negative for chills and fever.   Cardiovascular: Negative for chest pain.   Gastrointestinal: Negative for abdominal pain, constipation and vomiting.     Objective:     Vital Signs (Most Recent):  Temp: 98.2 °F (36.8 °C) (02/09/20 0803)  Pulse: 70 (02/09/20 0913)  Resp: 16 (02/09/20 0913)  BP: 126/60 (02/09/20 0803)  SpO2: 99 % (02/09/20 0913) Vital Signs (24h Range):  Temp:  [98 °F (36.7 °C)-98.5 °F (36.9 °C)] 98.2 °F (36.8 °C)  Pulse:  [] 70  Resp:  [16-20] 16  SpO2:  [88 %-99 %] 99 %  BP: (126-175)/(58-73) 126/60     Weight: 77.6 kg (171 lb 1.2 oz)  Body mass index is 28.47 kg/m².    Intake/Output Summary (Last 24 hours) at 2/9/2020 1440  Last data filed at 2/8/2020 1600  Gross per 24 hour   Intake --   Output 1100 ml   Net -1100 ml      Physical Exam   Constitutional: She appears well-developed and well-nourished.   HENT:   Head: Normocephalic and atraumatic.   Oral mucosa, lips - Dry   Eyes: Pupils are equal, round, and reactive to light. EOM are normal.   Neck: Normal range of motion. No tracheal deviation present.   Cardiovascular: Normal heart sounds.   No murmur heard.  Pulmonary/Chest: She has no wheezes. She has rales.   On NC   Abdominal: Soft. Bowel sounds are normal. There is no tenderness.   Percutaneous drain   Musculoskeletal: Normal range of motion. She exhibits no edema.   Neurological: She is alert.   O x 1 to  person       Significant Labs: All pertinent labs within the past 24 hours have been reviewed.    Significant Imaging: I have reviewed and interpreted all pertinent imaging results/findings within the past 24 hours.

## 2020-02-09 NOTE — HPI
H&P taken from chart as patient unable to give reliable history:    Patient is a 74 year old female with medical history significant for hypothyroidism, HTN, DMII with neuropathy, tobacco abuse (with no diagnosis of COPD), and history of CVA s/p  shunt who initially presented to [facility] with complaints of SOB and abdominal pain. Although patient is oriented to person, time, and place, she still is having some odd behavior. So I gathered her history from her, her medical chart, and from calling her daughter. She reports that late January she had been having abdominal pain with associated SOB for a few weeks; despite a heavy smoking history, family reports that she has never had SOB or a diagnosis of COPD. She reports no alleviating or aggravating factors, just that the pain was sharp and diffuse.   She was admitted on 1/19 and had a complicated hospital course. Briefly, patient underwent CT abdomen on 1/20 which showed evidence of cholecystitis as well as an sigmoid colon abscess. She had a percutaneous cholecystostomy tube placed on 1/24. The abscess was not drained as IR felt it was unsafe to do so. During her hospital course, her blood cultures were positive for MSSA and she had evidence of UTI. Her antibiotics were changed a few times, but she currently is on cipro and flagyl for intraabdominal infection prophylaxis and on vanc and rifampin for MSSA bacteremia. KESHIA showed inferobasal hypokinesis and EF of 55%; no vegetation noted.  In addition to this she had O2 saturation in the mid-80s. Initially she was placed on bipap and treated for COPD exacerbation; she completed a course of steroids and got one dose of Lasix. However her respiratory status worsening and she was placed in MICU and required intubation from 1/20-1/31. She was Bipap dependent following extubation until 2/6 and is now on nasal cannula; of note, she did require pressors during MICU stay. On top of all this, patient also developed  paroxysmal a fib. CHADSVASC elevated however she could not be anticoagulated due to gross hematuria requiring blood transfusion. Patient transferred for second IR opinion regarding drainage of sigmoid abscess and out of confusion and frustration from the multiple consultants at [facility].     Patient admitted and ID consulted for MSSA bacteremia and abdominal abscess.  Patient on Vanc/Cipro/Flagyl.  She has been afebrile and WBC WNL. BCXS NGTD.  She says her  shunt was put in many years ago.  The patient denies any recent fever, chills, ABD, night sweats or sweats.  PICC in place.     CT ABD/Pelvis repeated:  - Interval decrease in of the small pelvic collection adjacent to the sigmoid colon.  This measures 3.2 x 3.0 x 2.4 cm today (previously 3.5 x 5.0 x 5.3 cm). -Given small size and interposed bowel and bladder, percutaneous drainage is not recommended at this time.  There is persistent mild sigmoid wall thickening and mesenteric stranding.  - The gallbladder is decompressed with a small bore catheter within the gallbladder.  Small amount of pericholecystic fluid.  - Mild hydronephrosis and hydroureter on the right with distal urothelial enhancement.  Degree of hydronephrosis may be slightly improved from prior..  While findings may be reactive due to the previously described pelvic processes, a urothelial neoplasm can not be definitively excluded.  Cystoscopy may be indicated upon resolution of the patient's acute presentation..  - Similar size of the thin walled cystic structure posterior to the stomach which now contains a small amount of gas.  It is uncertain whether this represents a gastric diverticulum  - Bibasilar atelectasis with partially evaluated left lower lung consolidation which could be secondary to aspiration or pneumonia.    IR defers aspiration.

## 2020-02-09 NOTE — ASSESSMENT & PLAN NOTE
Debility  Overweight    - Disposition pending IR and family conversation regarding options for source control of abscess   - Will be going to SNF when stable

## 2020-02-09 NOTE — ASSESSMENT & PLAN NOTE
- Patient with evidence of MSSA bacteremia on blood cultures taken on 1/20. First vanc trough drawn on 1/23  - Sensitivities show that staph aureus is sensitive to cefazolin and oxacillin. However patient is still on vanc with documented allergy to penicillins. However patient is unclear as to what reaction she gets and daughter is unclear as well  - In addition, it appears that patient would have received full course of antibiotic for bacteremia as no further blood cultures were positive for MSSA after 1/20 cultures  - Unclear if patient still needs to be treated with vanc at all for bacteremia as if therapy was continued throughout whole course, she would have gotten 7-14 days of treatment already. Patient reports PICC line was placed late in her hospitalization. Will leave in as patient is difficult stick and bacteremia is likely treated.  - Blood cultures repeated  - Evaluated by ID appreciate recs, continue Vancomycin  Off note  shunt was tapped by MANDEEP in OSH on 1/25 with negative studies  Discussed with NSGY, as patient does not have meningitis,  shunt is not the source of MSSA and if menigitis suspected to first evaluate with LP.

## 2020-02-09 NOTE — ASSESSMENT & PLAN NOTE
- Patient with evidence of  an abscess of 3.5 x 5 x 5.3 cm insinuated between the sigmoid colon and the dome of the urinary bladder, possibly in the setting of sigmoid diverticulitis  - Not drained by IR at the time as it was deemed unsafe to do so  - Family wanted transfer for IR second opinion.   - Evaluated by IR, decreased size of the sigmoid collection, intervention deferred for now  - Blood cultures x2 collected, per outside hospital records, blood cultures have been clear since 1/20 ( See MSSA bacteremia)  - Continue cipro flagyl for now, lactate wnl

## 2020-02-09 NOTE — ASSESSMENT & PLAN NOTE
- 1/20/20 3/3 bottles with MSSA  - BCXs 1/22 NG  - ? Source  - concern as patient has  shunt   - rec NSGY consult for  shunt eval and TAP given MSSA bacteremia, unclear source, and unsure if patient confused or at baseline  -stable non septic  - continue vanc for now

## 2020-02-09 NOTE — PROGRESS NOTES
Pharmacokinetic Assessment Follow Up: IV Vancomycin    Vancomycin serum concentration assessment(s):  · Trough of 20.7 mcg/mL was drawn ~2hrs late and will be used to guide therapy  · Estimated true trough likely closer to 25 mcg/mL ( t1/2= 19hr)  · Supratherapeutic trough for target of 15-20 mcg/mL  · Renal function stable    Vancomycin Regimen Plan:  1. Will adjust regimen to vancomycin 1250mg q24 hours  2. Obtain trough prior to 3rd dose of new regimen on 2/11 @ 1530    Drug levels (last 3 results):  Recent Labs   Lab Result Units 02/09/20  1202   Vancomycin-Trough ug/mL 20.7       Pharmacy will continue to follow and monitor vancomycin.    Please contact pharmacy at extension [phone] for questions regarding this assessment.    Thank you for the consult,   Marta Quintanilla       Patient brief summary:  Jeni Chacon is a 74 y.o. female initiated on antimicrobial therapy with IV Vancomycin for treatment of bacteremia    The patient's current regimen is 750mg q12 hours    Drug Allergies:   Review of patient's allergies indicates:   Allergen Reactions    Lisinopril Swelling    Penicillins        Renal Function:   Estimated Creatinine Clearance: 52.3 mL/min (based on SCr of 1 mg/dL).,       CBC (last 72 hours):  Recent Labs   Lab Result Units 02/07/20 1847 02/08/20  0239 02/09/20  0554   WBC K/uL 9.37 8.42 7.37   Hemoglobin g/dL 8.2* 7.5* 8.1*   Hemoglobin A1C % 6.9*  --   --    Hematocrit % 25.9* 24.5* 26.3*   Platelets K/uL 456* 433* 439*   Gran% % 64.6 67.5 66.4   Lymph% % 18.5 15.6* 13.4*   Mono% % 12.2 12.4 14.9   Eosinophil% % 3.8 3.8 4.5   Basophil% % 0.3 0.2 0.3   Differential Method  Automated Automated Automated       Metabolic Panel (last 72 hours):  Recent Labs   Lab Result Units 02/07/20  1847 02/08/20  0239 02/08/20  1050 02/09/20  0554   Sodium mmol/L 143 142  --  146*   Potassium mmol/L 4.0 3.7  --  3.3*   Chloride mmol/L 106 107  --  108   CO2 mmol/L 27 27  --  28   Glucose mg/dL 191* 202*  --  198*    Glucose, UA   --   --  Negative  --    BUN, Bld mg/dL 11 10  --  7*   Creatinine mg/dL 1.1 1.0  --  1.0   Albumin g/dL 2.1* 2.0*  --  2.0*   Total Bilirubin mg/dL 0.4 0.4  --  0.3   Alkaline Phosphatase U/L 104 84  --  75   AST U/L 14 17  --  13   ALT U/L 9* 8*  --  8*   Magnesium mg/dL 1.7 1.7  --  1.7   Phosphorus mg/dL 2.6* 3.3  --  3.9       Vancomycin Administrations:  vancomycin given in the last 96 hours                   vancomycin 750 mg in dextrose 5 % 250 mL IVPB (ready to mix system) (mg) 750 mg New Bag 02/08/20 2200     750 mg New Bag  0849     750 mg New Bag 02/07/20 2202                Microbiologic Results:  Microbiology Results (last 7 days)     Procedure Component Value Units Date/Time    Blood culture (site 2) [[id number]] Collected:  02/07/20 1847    Order Status:  Completed Specimen:  Blood Updated:  02/08/20 2212     Blood Culture, Routine No Growth to date      No Growth to date    Narrative:       Site # 2, aerobic only    Blood culture (site 1) [[id number]] Collected:  02/07/20 1847    Order Status:  Completed Specimen:  Blood Updated:  02/08/20 2212     Blood Culture, Routine No Growth to date      No Growth to date    Narrative:       Site # 1, aerobic and anaerobic    Respiratory Infection Panel, Nasopharyngeal [[id number]]     Order Status:  No result Specimen:  Nasopharyngeal Swab

## 2020-02-09 NOTE — NURSING
Patient nasogastric tube unsuccessful after 3 attempts. Patient pulls tube out immediately after it's placed. Internal medicine team notified.

## 2020-02-09 NOTE — CONSULTS
[facility]  Infectious Disease  Consult Note    Patient Name: Jeni Chacon  MRN: [id number]  Admission Date: 2/7/2020  Hospital Length of Stay: 1 days  Attending Physician: Tiara Killian MD  Primary Care Provider: Primary Doctor No     Isolation Status: No active isolations    Patient information was obtained from patient and ER records.      Consults  Assessment/Plan:     * Abscess of sigmoid colon  - ? Cause - infected diverticulum postulated as cause  - ? Could  shunt distal catheter could be in area draining causing fluid collection - rec discuss with radiology  - appears improved on most recent CT  - consider GI consult as may need consideration for colonoscopy  - on Vanc, Cipro and Flagy    MSSA bacteremia  - 1/20/20 3/3 bottles with MSSA  - BCXs 1/22 NG  - ? Source  - concern as patient has  shunt   - rec NSGY consult for  shunt eval and TAP given MSSA bacteremia, unclear source, and unsure if patient confused or at baseline  -stable non septic  - continue vanc for now        Thank you for your consult. I will follow-up with patient. Please contact us if you have any additional questions.    ROSANNE Bailey  Infectious Disease  [facility]    Subjective:     Principal Problem: Abscess of sigmoid colon    HPI: H&P taken from chart as patient unable to give reliable history:    Patient is a 74 year old female with medical history significant for hypothyroidism, HTN, DMII with neuropathy, tobacco abuse (with no diagnosis of COPD), and history of CVA s/p  shunt who initially presented to [facility] with complaints of SOB and abdominal pain. Although patient is oriented to person, time, and place, she still is having some odd behavior. So I gathered her history from her, her medical chart, and from calling her daughter. She reports that late January she had been having abdominal pain with associated SOB for a few weeks; despite a heavy smoking history,  family reports that she has never had SOB or a diagnosis of COPD. She reports no alleviating or aggravating factors, just that the pain was sharp and diffuse.   She was admitted on 1/19 and had a complicated hospital course. Briefly, patient underwent CT abdomen on 1/20 which showed evidence of cholecystitis as well as an sigmoid colon abscess. She had a percutaneous cholecystostomy tube placed on 1/24. The abscess was not drained as IR felt it was unsafe to do so. During her hospital course, her blood cultures were positive for MSSA and she had evidence of UTI. Her antibiotics were changed a few times, but she currently is on cipro and flagyl for intraabdominal infection prophylaxis and on vanc and rifampin for MSSA bacteremia. KESHIA showed inferobasal hypokinesis and EF of 55%; no vegetation noted.  In addition to this she had O2 saturation in the mid-80s. Initially she was placed on bipap and treated for COPD exacerbation; she completed a course of steroids and got one dose of Lasix. However her respiratory status worsening and she was placed in MICU and required intubation from 1/20-1/31. She was Bipap dependent following extubation until 2/6 and is now on nasal cannula; of note, she did require pressors during MICU stay. On top of all this, patient also developed paroxysmal a fib. CHADSVASC elevated however she could not be anticoagulated due to gross hematuria requiring blood transfusion. Patient transferred for second IR opinion regarding drainage of sigmoid abscess and out of confusion and frustration from the multiple consultants at [facility].     Patient admitted and ID consulted for MSSA bacteremia and abdominal abscess.  Patient on Vanc/Cipro/Flagyl.  She has been afebrile and WBC WNL. BCXS NGTD.  She says her  shunt was put in many years ago.  The patient denies any recent fever, chills, ABD, night sweats or sweats.  PICC in place.     CT ABD/Pelvis repeated:  - Interval decrease in of the small  pelvic collection adjacent to the sigmoid colon.  This measures 3.2 x 3.0 x 2.4 cm today (previously 3.5 x 5.0 x 5.3 cm). -Given small size and interposed bowel and bladder, percutaneous drainage is not recommended at this time.  There is persistent mild sigmoid wall thickening and mesenteric stranding.  - The gallbladder is decompressed with a small bore catheter within the gallbladder.  Small amount of pericholecystic fluid.  - Mild hydronephrosis and hydroureter on the right with distal urothelial enhancement.  Degree of hydronephrosis may be slightly improved from prior..  While findings may be reactive due to the previously described pelvic processes, a urothelial neoplasm can not be definitively excluded.  Cystoscopy may be indicated upon resolution of the patient's acute presentation..  - Similar size of the thin walled cystic structure posterior to the stomach which now contains a small amount of gas.  It is uncertain whether this represents a gastric diverticulum  - Bibasilar atelectasis with partially evaluated left lower lung consolidation which could be secondary to aspiration or pneumonia.    IR defers aspiration.       Past Medical History:   Diagnosis Date    Brain aneurysm     Cancer     thyroid    Diabetes mellitus     Diabetic neuropathy     Hypertension     Hypothyroid     Stroke     Tobacco abuse        Past Surgical History:   Procedure Laterality Date    Breast screening      FETOSCOPIC LASER OF POSTERIOR URETHRAL VALVE W/ SHUNT PLACEMENT      OTHER SURGICAL HISTORY      stent in brain     THYROID SURGERY      TUBAL LIGATION         Review of patient's allergies indicates:   Allergen Reactions    Lisinopril Swelling    Penicillins        Medications:  Medications Prior to Admission   Medication Sig    amLODIPine (NORVASC) 10 MG tablet Take 10 mg by mouth once daily.    glimepiride (AMARYL) 4 MG tablet Take 4 mg by mouth before breakfast.    hydrALAZINE (APRESOLINE) 10 MG  tablet Take 10 mg by mouth every 12 (twelve) hours.    hydroCHLOROthiazide (HYDRODIURIL) 12.5 MG Tab Take 12.5 mg by mouth once daily.    levothyroxine (SYNTHROID) 200 MCG tablet Take 200 mcg by mouth before breakfast.    metFORMIN (GLUCOPHAGE) 500 MG tablet Take 500 mg by mouth 3 (three) times daily.     Antibiotics (From admission, onward)    Start     Stop Route Frequency Ordered    20  vancomycin 750 mg in dextrose 5 % 250 mL IVPB (ready to mix system)      -- IV Every 12 hours (non-standard times) 20  ciprofloxacin (CIPRO)400mg/200ml D5W IVPB 400 mg      -- IV Every 12 hours (non-standard times) 20  metronidazole IVPB 500 mg      -- IV Every 8 hours (non-standard times) 20        Antifungals (From admission, onward)    None        Antivirals (From admission, onward)    None             There is no immunization history on file for this patient.    Family History     Problem Relation (Age of Onset)    Diabetes Mother    HIV Son    Heart disease Mother, Son    Hypertension Mother        Social History     Socioeconomic History    Marital status: Single     Spouse name: Not on file    Number of children: Not on file    Years of education: Not on file    Highest education level: Not on file   Occupational History    Not on file   Social Needs    Financial resource strain: Not on file    Food insecurity:     Worry: Not on file     Inability: Not on file    Transportation needs:     Medical: Not on file     Non-medical: Not on file   Tobacco Use    Smoking status: Former Smoker     Packs/day: 1.00     Types: Cigarettes     Last attempt to quit: 2019     Years since quittin.6    Tobacco comment: Many years   Substance and Sexual Activity    Alcohol use: No    Drug use: No    Sexual activity: Not on file   Lifestyle    Physical activity:     Days per week: Not on file     Minutes per session: Not on file    Stress: Not  on file   Relationships    Social connections:     Talks on phone: Not on file     Gets together: Not on file     Attends [employer] service: Not on file     Active member of club or organization: Not on file     Attends meetings of clubs or organizations: Not on file     Relationship status: Not on file   Other Topics Concern    Not on file   Social History Narrative    Not on file     Review of Systems   Constitutional: Negative for appetite change, chills, diaphoresis, fatigue, fever and unexpected weight change.   HENT: Negative for congestion, ear pain, hearing loss, sore throat and tinnitus.    Eyes: Negative for pain, redness and visual disturbance.   Respiratory: Negative for cough, chest tightness, shortness of breath and wheezing.    Cardiovascular: Negative for chest pain.   Gastrointestinal: Negative for abdominal pain, constipation, diarrhea, nausea and vomiting.   Endocrine: Negative for cold intolerance and heat intolerance.   Genitourinary: Negative for decreased urine volume, difficulty urinating, dysuria, flank pain, frequency, hematuria and urgency.   Musculoskeletal: Negative for arthralgias, back pain, myalgias and neck pain.   Skin: Negative for rash and wound.   Allergic/Immunologic: Negative for environmental allergies, food allergies and immunocompromised state.   Neurological: Positive for facial asymmetry. Negative for dizziness, weakness, light-headedness, numbness and headaches.   Hematological: Negative for adenopathy. Does not bruise/bleed easily.   Psychiatric/Behavioral: Negative for agitation, behavioral problems and confusion.     Objective:     Vital Signs (Most Recent):  Temp: 98.5 °F (36.9 °C) (02/08/20 1514)  Pulse: (!) 112 (02/08/20 2000)  Resp: 20 (02/08/20 2000)  BP: (!) 175/67 (02/08/20 1514)  SpO2: 97 % (02/08/20 2000) Vital Signs (24h Range):  Temp:  [96.2 °F (35.7 °C)-98.6 °F (37 °C)] 98.5 °F (36.9 °C)  Pulse:  [] 112  Resp:  [16-20] 20  SpO2:  [88 %-99 %] 97  %  BP: (118-175)/(67-92) 175/67     Weight: 77.6 kg (171 lb 1.2 oz)  Body mass index is 28.47 kg/m².    Estimated Creatinine Clearance: 52.3 mL/min (based on SCr of 1 mg/dL).    Physical Exam   Constitutional: She appears well-developed and well-nourished. No distress.       HENT:   Head: Normocephalic and atraumatic.   Cardiovascular: Normal rate, regular rhythm and normal heart sounds. Exam reveals no gallop and no friction rub.   No murmur heard.  Pulmonary/Chest: Effort normal and breath sounds normal. No stridor. No respiratory distress. She has no wheezes. She has no rales.   Abdominal: Soft. Bowel sounds are normal. She exhibits no distension and no mass. There is no tenderness. There is no rebound and no guarding.   Musculoskeletal: She exhibits no edema.   Neurological: She is alert.   O to person   Skin: Skin is warm and dry. She is not diaphoretic.       Significant Labs:   Blood Culture:   Recent Labs   Lab 02/07/20 1847   LABBLOO No Growth to date  No Growth to date     CBC:   Recent Labs   Lab 02/07/20 1847 02/08/20 0239   WBC 9.37 8.42   HGB 8.2* 7.5*   HCT 25.9* 24.5*   * 433*     CMP:   Recent Labs   Lab 02/07/20 1847 02/08/20  0239    142   K 4.0 3.7    107   CO2 27 27   * 202*   BUN 11 10   CREATININE 1.1 1.0   CALCIUM 8.5* 8.0*   PROT 7.9 7.3   ALBUMIN 2.1* 2.0*   BILITOT 0.4 0.4   ALKPHOS 104 84   AST 14 17   ALT 9* 8*   ANIONGAP 10 8   EGFRNONAA 50.3* 56.4*     Wound Culture: No results for input(s): LABAERO in the last 4320 hours.  All pertinent labs within the past 24 hours have been reviewed.    Significant Imaging: I have reviewed all pertinent imaging results/findings within the past 24 hours.   CT Abdomen Pelvis With Contrast [[id number]] Resulted: 02/08/20 1351   Order Status: Completed Updated: 02/08/20 1354   Narrative:     EXAMINATION:  CT ABDOMEN PELVIS WITH CONTRAST    CLINICAL HISTORY:  to re-evaluate the sigmoid colon abscess;    TECHNIQUE:  Low dose  axial images, sagittal and coronal reformations were obtained from the lung bases to the pubic symphysis following the IV administration of 75 mL of Omnipaque 350 and the oral administration of 30 mL of Omnipaque 350.    COMPARISON:  Outside CT 01/20/2023    FINDINGS:  Limited evaluations of the lung bases show atelectatic change.  Questionable consolidation in the left lower lobe dependently.  Visualized heart shows coronary atherosclerosis.    Liver is normal in size without focal mass or biliary ductal dilatation.  No biliary ductal dilatation.  The gallbladder is decompressed and contains gallstones.  There is a small percutaneous drain within the gallbladder.  Small amount of pericholecystic fluid..  There is small amount of pericholecystic fluid identified.    The spleen, adrenal glands, and pancreas show no focal abnormality.    The left kidney is normal in size without solid mass or hydronephrosis.  There is a large right peripelvic renal cyst measuring roughly 7.2 cm with minimal peripheral enhancement and no mural nodularity..  Mild right-sided hydronephrosis and hydroureter with mild distal urothelial enhancement of the distal right ureter.  Urinary bladder shows mild wall thickening at the dome likely reactive due to the adjacent small fluid collection.    The gastrointestinal tract shows no evidence of obstruction.  Mild sigmoid wall thickening and mesenteric stranding.  At the inferior aspect of the sigmoid colon, superior to the bladder dome there is a 3.2 x 2.9 x 2.4 cm collection (previously reported 3.5 x 5.0 x 5.3 cm).  There is also a thin walled gas and fluid collection at the posterior aspect of the stomach measuring roughly 5.7 by 3.4 by 3.5 cm.  There is an enteric tube is within the distal stomach.  Intra-abdominal portion of a suspected ventriculoperitoneal shunt is identified and appears continuous.    The uterus appears grossly unremarkable.    Vascular structures show moderate  atherosclerosis.  No acute fracture or destructive lesion.   Impression:       Interval decrease in of the small pelvic collection adjacent to the sigmoid colon.  This measures 3.2 x 3.0 x 2.4 cm today (previously 3.5 x 5.0 x 5.3 cm).  Given small size and interposed bowel and bladder, percutaneous drainage is not recommended at this time.  There is persistent mild sigmoid wall thickening and mesenteric stranding.    The gallbladder is decompressed with a small bore catheter within the gallbladder.  Small amount of pericholecystic fluid.    Mild hydronephrosis and hydroureter on the right with distal urothelial enhancement.  Degree of hydronephrosis may be slightly improved from prior..  While findings may be reactive due to the previously described pelvic processes, a urothelial neoplasm can not be definitively excluded.  Cystoscopy may be indicated upon resolution of the patient's acute presentation..    Similar size of the thin walled cystic structure posterior to the stomach which now contains a small amount of gas.  It is uncertain whether this represents a gastric diverticulum    Bibasilar atelectasis with partially evaluated left lower lung consolidation which could be secondary to aspiration or pneumonia.      Electronically signed by: Carl Alegria MD  Date: 02/08/2020  Time: 13:51   X-Ray Chest 1 View [[id number]] Resulted: 02/08/20 0630   Order Status: Completed Updated: 02/08/20 0632   Narrative:     EXAMINATION:  XR CHEST 1 VIEW    CLINICAL HISTORY:  increased work of breathing;    TECHNIQUE:  Single frontal view of the chest was performed.    COMPARISON:  None.    FINDINGS:  Enteric catheter crosses the diaphragm with distal aspect not seen.  Right-sided PICC line with tip projecting over the SVC.  Mediastinal structures are midline.  Hilar contours are unremarkable.  Cardiac silhouette is magnified by AP technique.  Lung volumes are low but symmetric.  There is patchy increased attenuation within  the left lower lung zone, possibly atelectasis or consolidation.  No pneumothorax.  No large pleural effusions.  No free air beneath the diaphragm.  Degenerative changes of the spine and shoulders noted.   Impression:       1. Hypoventilatory lung changes with patchy increased attenuation within the left lower lung zone which could relate to atelectasis or consolidation.  No pneumothorax or large pleural effusion.      Electronically signed by: Panchito Joyner MD  Date: 02/08/2020  Time: 06:30   Imaging History     2020  Date Procedure Name PACS Link Status Accession Number Location   02/08/20 10:43 AM CT Abdomen Pelvis With Contrast  [id number]   02/07/20 08:34 PM X-Ray Chest 1 View  Images Final [id number] [facility]

## 2020-02-09 NOTE — NURSING
Patient confused. Oriented to self only. Needing frequent redirection. No distress noted. o2 at 3L. O2 sats between 94-96. Tele sitter active. Bed alarm activated. Side rails up. Call light with in reach. frequent rounding on patient ne to meet her needs.

## 2020-02-09 NOTE — SUBJECTIVE & OBJECTIVE
Past Medical History:   Diagnosis Date    Brain aneurysm     Cancer     thyroid    Diabetes mellitus     Diabetic neuropathy     Hypertension     Hypothyroid     Stroke     Tobacco abuse        Past Surgical History:   Procedure Laterality Date    Breast screening      FETOSCOPIC LASER OF POSTERIOR URETHRAL VALVE W/ SHUNT PLACEMENT      OTHER SURGICAL HISTORY      stent in brain     THYROID SURGERY      TUBAL LIGATION         Review of patient's allergies indicates:   Allergen Reactions    Lisinopril Swelling    Penicillins        Medications:  Medications Prior to Admission   Medication Sig    amLODIPine (NORVASC) 10 MG tablet Take 10 mg by mouth once daily.    glimepiride (AMARYL) 4 MG tablet Take 4 mg by mouth before breakfast.    hydrALAZINE (APRESOLINE) 10 MG tablet Take 10 mg by mouth every 12 (twelve) hours.    hydroCHLOROthiazide (HYDRODIURIL) 12.5 MG Tab Take 12.5 mg by mouth once daily.    levothyroxine (SYNTHROID) 200 MCG tablet Take 200 mcg by mouth before breakfast.    metFORMIN (GLUCOPHAGE) 500 MG tablet Take 500 mg by mouth 3 (three) times daily.     Antibiotics (From admission, onward)    Start     Stop Route Frequency Ordered    02/07/20 2130  vancomycin 750 mg in dextrose 5 % 250 mL IVPB (ready to mix system)      -- IV Every 12 hours (non-standard times) 02/07/20 2035 02/07/20 2030  ciprofloxacin (CIPRO)400mg/200ml D5W IVPB 400 mg      -- IV Every 12 hours (non-standard times) 02/07/20 1919 02/07/20 2030  metronidazole IVPB 500 mg      -- IV Every 8 hours (non-standard times) 02/07/20 1919        Antifungals (From admission, onward)    None        Antivirals (From admission, onward)    None             There is no immunization history on file for this patient.    Family History     Problem Relation (Age of Onset)    Diabetes Mother    HIV Son    Heart disease Mother, Son    Hypertension Mother        Social History     Socioeconomic History    Marital status: Single      Spouse name: Not on file    Number of children: Not on file    Years of education: Not on file    Highest education level: Not on file   Occupational History    Not on file   Social Needs    Financial resource strain: Not on file    Food insecurity:     Worry: Not on file     Inability: Not on file    Transportation needs:     Medical: Not on file     Non-medical: Not on file   Tobacco Use    Smoking status: Former Smoker     Packs/day: 1.00     Types: Cigarettes     Last attempt to quit: 2019     Years since quittin.6    Tobacco comment: Many years   Substance and Sexual Activity    Alcohol use: No    Drug use: No    Sexual activity: Not on file   Lifestyle    Physical activity:     Days per week: Not on file     Minutes per session: Not on file    Stress: Not on file   Relationships    Social connections:     Talks on phone: Not on file     Gets together: Not on file     Attends Tenriism service: Not on file     Active member of club or organization: Not on file     Attends meetings of clubs or organizations: Not on file     Relationship status: Not on file   Other Topics Concern    Not on file   Social History Narrative    Not on file     Review of Systems   Constitutional: Negative for appetite change, chills, diaphoresis, fatigue, fever and unexpected weight change.   HENT: Negative for congestion, ear pain, hearing loss, sore throat and tinnitus.    Eyes: Negative for pain, redness and visual disturbance.   Respiratory: Negative for cough, chest tightness, shortness of breath and wheezing.    Cardiovascular: Negative for chest pain.   Gastrointestinal: Negative for abdominal pain, constipation, diarrhea, nausea and vomiting.   Endocrine: Negative for cold intolerance and heat intolerance.   Genitourinary: Negative for decreased urine volume, difficulty urinating, dysuria, flank pain, frequency, hematuria and urgency.   Musculoskeletal: Negative for arthralgias, back pain, myalgias and  neck pain.   Skin: Negative for rash and wound.   Allergic/Immunologic: Negative for environmental allergies, food allergies and immunocompromised state.   Neurological: Positive for facial asymmetry. Negative for dizziness, weakness, light-headedness, numbness and headaches.   Hematological: Negative for adenopathy. Does not bruise/bleed easily.   Psychiatric/Behavioral: Negative for agitation, behavioral problems and confusion.     Objective:     Vital Signs (Most Recent):  Temp: 98.5 °F (36.9 °C) (02/08/20 1514)  Pulse: (!) 112 (02/08/20 2000)  Resp: 20 (02/08/20 2000)  BP: (!) 175/67 (02/08/20 1514)  SpO2: 97 % (02/08/20 2000) Vital Signs (24h Range):  Temp:  [96.2 °F (35.7 °C)-98.6 °F (37 °C)] 98.5 °F (36.9 °C)  Pulse:  [] 112  Resp:  [16-20] 20  SpO2:  [88 %-99 %] 97 %  BP: (118-175)/(67-92) 175/67     Weight: 77.6 kg (171 lb 1.2 oz)  Body mass index is 28.47 kg/m².    Estimated Creatinine Clearance: 52.3 mL/min (based on SCr of 1 mg/dL).    Physical Exam   Constitutional: She appears well-developed and well-nourished. No distress.       HENT:   Head: Normocephalic and atraumatic.   Cardiovascular: Normal rate, regular rhythm and normal heart sounds. Exam reveals no gallop and no friction rub.   No murmur heard.  Pulmonary/Chest: Effort normal and breath sounds normal. No stridor. No respiratory distress. She has no wheezes. She has no rales.   Abdominal: Soft. Bowel sounds are normal. She exhibits no distension and no mass. There is no tenderness. There is no rebound and no guarding.   Musculoskeletal: She exhibits no edema.   Neurological: She is alert.   O to person   Skin: Skin is warm and dry. She is not diaphoretic.       Significant Labs:   Blood Culture:   Recent Labs   Lab 02/07/20 1847   LABBLOO No Growth to date  No Growth to date     CBC:   Recent Labs   Lab 02/07/20 1847 02/08/20  0239   WBC 9.37 8.42   HGB 8.2* 7.5*   HCT 25.9* 24.5*   * 433*     CMP:   Recent Labs   Lab  02/07/20  1847 02/08/20  0239    142   K 4.0 3.7    107   CO2 27 27   * 202*   BUN 11 10   CREATININE 1.1 1.0   CALCIUM 8.5* 8.0*   PROT 7.9 7.3   ALBUMIN 2.1* 2.0*   BILITOT 0.4 0.4   ALKPHOS 104 84   AST 14 17   ALT 9* 8*   ANIONGAP 10 8   EGFRNONAA 50.3* 56.4*     Wound Culture: No results for input(s): LABAERO in the last 4320 hours.  All pertinent labs within the past 24 hours have been reviewed.    Significant Imaging: I have reviewed all pertinent imaging results/findings within the past 24 hours.   CT Abdomen Pelvis With Contrast [[id number]] Resulted: 02/08/20 1351   Order Status: Completed Updated: 02/08/20 1354   Narrative:     EXAMINATION:  CT ABDOMEN PELVIS WITH CONTRAST    CLINICAL HISTORY:  to re-evaluate the sigmoid colon abscess;    TECHNIQUE:  Low dose axial images, sagittal and coronal reformations were obtained from the lung bases to the pubic symphysis following the IV administration of 75 mL of Omnipaque 350 and the oral administration of 30 mL of Omnipaque 350.    COMPARISON:  Outside CT 01/20/2023    FINDINGS:  Limited evaluations of the lung bases show atelectatic change.  Questionable consolidation in the left lower lobe dependently.  Visualized heart shows coronary atherosclerosis.    Liver is normal in size without focal mass or biliary ductal dilatation.  No biliary ductal dilatation.  The gallbladder is decompressed and contains gallstones.  There is a small percutaneous drain within the gallbladder.  Small amount of pericholecystic fluid..  There is small amount of pericholecystic fluid identified.    The spleen, adrenal glands, and pancreas show no focal abnormality.    The left kidney is normal in size without solid mass or hydronephrosis.  There is a large right peripelvic renal cyst measuring roughly 7.2 cm with minimal peripheral enhancement and no mural nodularity..  Mild right-sided hydronephrosis and hydroureter with mild distal urothelial enhancement of the  distal right ureter.  Urinary bladder shows mild wall thickening at the dome likely reactive due to the adjacent small fluid collection.    The gastrointestinal tract shows no evidence of obstruction.  Mild sigmoid wall thickening and mesenteric stranding.  At the inferior aspect of the sigmoid colon, superior to the bladder dome there is a 3.2 x 2.9 x 2.4 cm collection (previously reported 3.5 x 5.0 x 5.3 cm).  There is also a thin walled gas and fluid collection at the posterior aspect of the stomach measuring roughly 5.7 by 3.4 by 3.5 cm.  There is an enteric tube is within the distal stomach.  Intra-abdominal portion of a suspected ventriculoperitoneal shunt is identified and appears continuous.    The uterus appears grossly unremarkable.    Vascular structures show moderate atherosclerosis.  No acute fracture or destructive lesion.   Impression:       Interval decrease in of the small pelvic collection adjacent to the sigmoid colon.  This measures 3.2 x 3.0 x 2.4 cm today (previously 3.5 x 5.0 x 5.3 cm).  Given small size and interposed bowel and bladder, percutaneous drainage is not recommended at this time.  There is persistent mild sigmoid wall thickening and mesenteric stranding.    The gallbladder is decompressed with a small bore catheter within the gallbladder.  Small amount of pericholecystic fluid.    Mild hydronephrosis and hydroureter on the right with distal urothelial enhancement.  Degree of hydronephrosis may be slightly improved from prior..  While findings may be reactive due to the previously described pelvic processes, a urothelial neoplasm can not be definitively excluded.  Cystoscopy may be indicated upon resolution of the patient's acute presentation..    Similar size of the thin walled cystic structure posterior to the stomach which now contains a small amount of gas.  It is uncertain whether this represents a gastric diverticulum    Bibasilar atelectasis with partially evaluated left  lower lung consolidation which could be secondary to aspiration or pneumonia.      Electronically signed by: Carl Alegria MD  Date: 02/08/2020  Time: 13:51   X-Ray Chest 1 View [[id number]] Resulted: 02/08/20 0630   Order Status: Completed Updated: 02/08/20 0632   Narrative:     EXAMINATION:  XR CHEST 1 VIEW    CLINICAL HISTORY:  increased work of breathing;    TECHNIQUE:  Single frontal view of the chest was performed.    COMPARISON:  None.    FINDINGS:  Enteric catheter crosses the diaphragm with distal aspect not seen.  Right-sided PICC line with tip projecting over the SVC.  Mediastinal structures are midline.  Hilar contours are unremarkable.  Cardiac silhouette is magnified by AP technique.  Lung volumes are low but symmetric.  There is patchy increased attenuation within the left lower lung zone, possibly atelectasis or consolidation.  No pneumothorax.  No large pleural effusions.  No free air beneath the diaphragm.  Degenerative changes of the spine and shoulders noted.   Impression:       1. Hypoventilatory lung changes with patchy increased attenuation within the left lower lung zone which could relate to atelectasis or consolidation.  No pneumothorax or large pleural effusion.      Electronically signed by: Panchito Joyner MD  Date: 02/08/2020  Time: 06:30   Imaging History     2020  Date Procedure Name PACS Link Status Accession Number Location   02/08/20 10:43 AM CT Abdomen Pelvis With Contrast  Images Final [id number] [facility]   02/07/20 08:34 PM X-Ray Chest 1 View  Images Final [id number] [facility]

## 2020-02-09 NOTE — HOSPITAL COURSE
Patient admitted with sigmoid abscess for IR drainage, evaluated by IR and per repeat imaging, size reduced and no intervention. Also came with a percutaneous cholecystostomy tube placed for acute cholecystitis.  She is also evaluated by ID: ID recommend discontinuing vancomycin as abscess is decreasing in size. Recommend continuing Cipro/Flagyl x 4 weeks. Speech evaluated, to continue dysphagia diet. PT/OT recommending SNF. Awaiting placement. Patient medically ready for discharge. Home health secured 2/24.

## 2020-02-09 NOTE — PROGRESS NOTES
[facility] Hospital Medicine  Progress Note    Patient Name: Jeni Chacon  MRN: [id number]  Patient Class: IP- Inpatient   Admission Date: 2/7/2020  Length of Stay: 2 days  Attending Physician: Tiara Killian MD  Primary Care Provider: Primary [facility] Medicine Team: [facility] MED 2 Ac Lou MD    Subjective:     Principal Problem:Abscess of sigmoid colon        HPI:  Patient is a 74 year old female with medical history significant for hypothyroidism, HTN, DMII with neuropathy, tobacco abuse (with no diagnosis of COPD), and history of CVA s/p  shunt who initially presented to [place] with complaints of SOB and abdominal pain. Although patient is oriented to person, time, and place, she still is having some odd behavior. So I gathered her history from her, her medical chart, and from calling her daughter. She reports that late January she had been having abdominal pain with associated SOB for a few weeks; despite a heavy smoking history, family reports that she has never had SOB or a diagnosis of COPD. She reports no alleviating or aggravating factors, just that the pain was sharp and diffuse.   She was admitted on 1/19 and had a complicated hospital course. Briefly, patient underwent CT abdomen on 1/20 which showed evidence of cholecystitis as well as an sigmoid colon abscess. She had a percutaneous cholecystostomy tube placed on 1/24. The abscess was not drained as IR felt it was unsafe to do so. During her hospital course, her blood cultures were positive for MSSA and she had evidence of UTI. Her antibiotics were changed a few times, but she currently is on cipro and flagyl for intraabdominal infection prophylaxis and on vanc and rifampin for MSSA bacteremia. KESHIA showed inferobasal hypokinesis and EF of 55%; no vegetation noted.  In addition to this she had O2 saturation in the mid-80s. Initially she was placed on bipap and treated for COPD exacerbation; she  completed a course of steroids and got one dose of Lasix. However her respiratory status worsening and she was placed in MICU and required intubation from 1/20-1/31. She was Bipap dependent following extubation until 2/6 and is now on nasal cannula; of note, she did require pressors during MICU stay. On top of all this, patient also developed paroxysmal a fib. CHADSVASC elevated however she could not be anticoagulated due to gross hematuria requiring blood transfusion. Patient transferred for second IR opinion regarding drainage of sigmoid abscess and out of confusion and frustration from the multiple consultants at [place].     At the time of my exam, patient was tachycardic, tachypneic, and mildly hypertension. She was AAO x3, however denied all symptoms on review of systems despite appear to have increased work of breathing. She reports that this is her baseline breathing and that she is on 3 L of O2 at home, however she exhibits odd behavior like trying to squirm out of the bed, or being unable to sign blood consent because it does not look right. Of note, patient was transferred with PICC line, however per chart review, it appears that her bacteremia resulted from cultures on 1/20, and the first vanc trough was taken on 1/23. Follow up culture have been negative and patient reports that she got her PICC line more recently. In addition, patient was transferred with NG tube. She reports that she is able to swallow and that the last time she ate was yesterday, however per nursing hand off, patient was to get formal speech eval which was not done due to transfer.      Overview/Hospital Course:  Patient admitted with sigmoid abscess for IR drainage, evaluated by IR and per repeat imaging, size reduced and no intervention. Also came with a percutaneous cholecystostomy tube placed for acute cholecystitis.  She is also evaluated by ID and continuing Vancomycin, Ciprofloxacin and Flagyl. Speech evaluated, to  continue to be NPO but okay for meds and ice chips, to continue to be assessed by them.     Interval History: She is alert but O x 1 to person.   She does want to eat but denies any other symptoms, no abdominal pain, had BM    Review of Systems   Constitutional: Negative for chills and fever.   Cardiovascular: Negative for chest pain.   Gastrointestinal: Negative for abdominal pain, constipation and vomiting.     Objective:     Vital Signs (Most Recent):  Temp: 98.2 °F (36.8 °C) (02/09/20 0803)  Pulse: 70 (02/09/20 0913)  Resp: 16 (02/09/20 0913)  BP: 126/60 (02/09/20 0803)  SpO2: 99 % (02/09/20 0913) Vital Signs (24h Range):  Temp:  [98 °F (36.7 °C)-98.5 °F (36.9 °C)] 98.2 °F (36.8 °C)  Pulse:  [] 70  Resp:  [16-20] 16  SpO2:  [88 %-99 %] 99 %  BP: (126-175)/(58-73) 126/60     Weight: 77.6 kg (171 lb 1.2 oz)  Body mass index is 28.47 kg/m².    Intake/Output Summary (Last 24 hours) at 2/9/2020 1440  Last data filed at 2/8/2020 1600  Gross per 24 hour   Intake --   Output 1100 ml   Net -1100 ml      Physical Exam   Constitutional: She appears well-developed and well-nourished.   HENT:   Head: Normocephalic and atraumatic.   Oral mucosa, lips - Dry   Eyes: Pupils are equal, round, and reactive to light. EOM are normal.   Neck: Normal range of motion. No tracheal deviation present.   Cardiovascular: Normal heart sounds.   No murmur heard.  Pulmonary/Chest: She has no wheezes. She has rales.   On NC   Abdominal: Soft. Bowel sounds are normal. There is no tenderness.   Percutaneous drain   Musculoskeletal: Normal range of motion. She exhibits no edema.   Neurological: She is alert.   O x 1 to  person       Significant Labs: All pertinent labs within the past 24 hours have been reviewed.    Significant Imaging: I have reviewed and interpreted all pertinent imaging results/findings within the past 24 hours.      Assessment/Plan:      * Abscess of sigmoid colon  - Patient with evidence of  an abscess of 3.5 x 5 x 5.3  cm insinuated between the sigmoid colon and the dome of the urinary bladder, possibly in the setting of sigmoid diverticulitis  - Not drained by IR at the time as it was deemed unsafe to do so  - Family wanted transfer for IR second opinion.   - Evaluated by IR, decreased size of the sigmoid collection, intervention deferred for now  - Blood cultures x2 collected, per outside hospital records, blood cultures have been clear since 1/20 ( See MSSA bacteremia)  - Continue cipro flagyl for now, lactate wnl      Dysphagia  - Patient with NG tube in place due to inability to swallow prior to transfer  - Per nursing handoff patient was to get official speech evaluation, but was transferred prior  - Speech evaluated NPO for now with ice chips and meds okay, to continue assessing everyday and give further recommendations.   - Pulled out NGT, to        Paroxysmal A-fib  - Patient with reports of new onset paroxysmal afib during course of hospital stay  - Appears to be in sinus rhythm at the time of initial exam  - Continue metoprolol. Patient was getting 25 mg QID prior to transfer. Unclear if this was out of caution for hypotension or if increased frequency was needed for continued rate control. Will keep at this dose for now as patient may have received some doses prior to transfer. Consider switching to metoprolol succinate in the morning.   - Patient not anticoagulated at this time due to significant hematuria prior to transfer  * [id number]-ERDf Score for Atrial Fibrillation Stroke Ris  RESULT SUMMARY:  7 points  Stroke risk was 11.2% per year in >90,000 patients (the [place] Atrial Fibrillation Cohort Study) and 15.7% risk of stroke/TIA/systemic embolism  INPUTS:  Age --> 1 = 65-74  Sex --> 1 = Female  CHF history --> 0 = No  Hypertension history --> 1 = Yes  Stroke/TIA/thromboembolism history --> 2 = Yes  Vascular disease history (prior MI, peripheral artery disease, or aortic plaque) --> 1 = Yes  Diabetes history --> 1 =  Yes  - Patient should be anticoagulated pending stable hemoglobin          Hematuria  Blood loss anemia    - Patient with complicated hospital course including significant hematuria leading to blood loss anemia and need for blood transfusion.   - Hg stable at 8.5 for now. Patient has purewick, no signs of gross hematuria. INR 1.1  - type and screen ordered, blood consent signed  - Will keep cbc at daily frequency for now. If Hg with significant drop in the morning, consider increasing frequency and transfuse for Hg<7  - in terms of anticoagulation (see paroxysmal afib), hold for now. If Hg remains stable, consider starting anticoagulation        COPD (chronic obstructive pulmonary disease)  Respiratory failure with hypoxia  Respiratory distress    - Patient treated for COPD exacerbation with full course of steroids and breathing treatments prior to transfer  - Patient does not have formal diagnosis of COPD but reports heavy smoking history at a pack a day for many years  - Patient also reports using 3 L O2 at home, but daughter is not sure. Unspecified chronicity of hypoxic respiratory failure  - Will continue breathing treatment q6 and incentive spirometry  - Will follow up vbg as patient has apparent increased work of breathing.  - Patient is not on any inhalers at home, she will likely need rescue inhaler and inhaled corticosteroid at minimum at discharge       MSSA bacteremia  - Patient with evidence of MSSA bacteremia on blood cultures taken on 1/20. First vanc trough drawn on 1/23  - Sensitivities show that staph aureus is sensitive to cefazolin and oxacillin. However patient is still on vanc with documented allergy to penicillins. However patient is unclear as to what reaction she gets and daughter is unclear as well  - In addition, it appears that patient would have received full course of antibiotic for bacteremia as no further blood cultures were positive for MSSA after 1/20 cultures  - Unclear if patient  still needs to be treated with vanc at all for bacteremia as if therapy was continued throughout whole course, she would have gotten 7-14 days of treatment already. Patient reports PICC line was placed late in her hospitalization. Will leave in as patient is difficult stick and bacteremia is likely treated.  - Blood cultures repeated  - Evaluated by ID appreciate recs, continue Vancomycin  Off note  shunt was tapped by MANDEEP in OSH on 1/25 with negative studies  Discussed with NSGY, as patient does not have meningitis,  shunt is not the source of MSSA and if menigitis suspected to first evaluate with LP.         Discharge planning issues  Debility  Overweight    - Disposition pending IR and family conversation regarding options for source control of abscess   - Will be going to SNF when stable      Cholecystitis  - Ct scan done on 1/20 showed gallbladder to be markedly distended with sludge and stones, and with an indistinct wall. There was some pericholecystic inflammation in the posterior part. She had a percutaneous cholecystostomy tube placed on 1/24 for the acute cholecystitis  - Drain still in place  - Will continue cipro flagyl for intraabdominal prophylaxis as patient also has sigmoid colon abscess       Type 2 diabetes mellitus, without long-term current use of insulin  - HA1c 7.3 last October   - Repeat ordered  - Patient was on 6 units of insulin basal plus sliding scale prior to transfer, she does not use insulin at home  - Will keep sliding scale insulin for now. Calculate daily insulin needs, place on basal if greater glucose control is needed; no longer being treated with steroids      Hypothyroid  - Continue home levothyroxine.  - 2/7 thyroid labs show TSH elevated at 32, but T4 wnl of 1.05    Essential hypertension  - Patient takes amlodipine and hydrochlorothiazide at home  - metoprolol started for rate control for new onset afib prior to transfer  - Will resume metoprolol and amlodipine as  patient is mildly hypertensive  - Resume hydrochlorothiazide as needed        VTE Risk Mitigation (From admission, onward)         Ordered     Place sequential compression device  Until discontinued      02/08/20 0233     IP VTE HIGH RISK PATIENT  Once      02/07/20 1915                      Ac Lou MD  Department of Hospital Medicine   [facility]

## 2020-02-09 NOTE — PLAN OF CARE
Problem: Adult Inpatient Plan of Care  Goal: Plan of Care Review  Outcome: Ongoing, Progressing  Goal: Patient-Specific Goal (Individualization)  Outcome: Ongoing, Progressing  Goal: Absence of Hospital-Acquired Illness or Injury  Outcome: Ongoing, Progressing  Goal: Optimal Comfort and Wellbeing  Outcome: Ongoing, Progressing  Goal: Readiness for Transition of Care  Outcome: Ongoing, Progressing  Goal: Rounds/Family Conference  Outcome: Ongoing, Progressing   AAOx1. Vitals stable. Pt confused to place, time and situation. NG tube attempted and patient pulled it out repeatedly. Oral medications given as ordered crushed in pudding.

## 2020-02-09 NOTE — ASSESSMENT & PLAN NOTE
- ? Cause - infected diverticulum postulated as cause  - ? Could  shunt distal catheter could be in area draining causing fluid collection - rec discuss with radiology  - appears improved on most recent CT  - consider GI consult as may need consideration for colonoscopy  - on Vanc, Cipro and Gregg

## 2020-02-09 NOTE — ASSESSMENT & PLAN NOTE
- Patient with NG tube in place due to inability to swallow prior to transfer  - Per nursing handoff patient was to get official speech evaluation, but was transferred prior  - Speech evaluated NPO for now with ice chips and meds okay, to continue assessing everyday and give further recommendations.   - Pulled out NGT, to

## 2020-02-10 DIAGNOSIS — K63.0 ABSCESS OF SIGMOID COLON: Primary | ICD-10-CM

## 2020-02-10 LAB
ADENOVIRUS: NOT DETECTED
ALBUMIN SERPL BCP-MCNC: 2 G/DL (ref 3.5–5.2)
ALP SERPL-CCNC: 76 U/L (ref 55–135)
ALT SERPL W/O P-5'-P-CCNC: 9 U/L (ref 10–44)
ANION GAP SERPL CALC-SCNC: 9 MMOL/L (ref 8–16)
AST SERPL-CCNC: 15 U/L (ref 10–40)
BASOPHILS # BLD AUTO: 0.02 K/UL (ref 0–0.2)
BASOPHILS NFR BLD: 0.3 % (ref 0–1.9)
BILIRUB SERPL-MCNC: 0.3 MG/DL (ref 0.1–1)
BORDETELLA PARAPERTUSSIS (IS1001): NOT DETECTED
BORDETELLA PERTUSSIS (PTXP): NOT DETECTED
BUN SERPL-MCNC: 6 MG/DL (ref 8–23)
CALCIUM SERPL-MCNC: 7.8 MG/DL (ref 8.7–10.5)
CHLAMYDIA PNEUMONIAE: NOT DETECTED
CHLORIDE SERPL-SCNC: 109 MMOL/L (ref 95–110)
CO2 SERPL-SCNC: 26 MMOL/L (ref 23–29)
CORONAVIRUS 229E, COMMON COLD VIRUS: NOT DETECTED
CORONAVIRUS HKU1, COMMON COLD VIRUS: NOT DETECTED
CORONAVIRUS NL63, COMMON COLD VIRUS: NOT DETECTED
CORONAVIRUS OC43, COMMON COLD VIRUS: NOT DETECTED
CREAT SERPL-MCNC: 1 MG/DL (ref 0.5–1.4)
DIFFERENTIAL METHOD: ABNORMAL
EOSINOPHIL # BLD AUTO: 0.3 K/UL (ref 0–0.5)
EOSINOPHIL NFR BLD: 5.5 % (ref 0–8)
ERYTHROCYTE [DISTWIDTH] IN BLOOD BY AUTOMATED COUNT: 19.9 % (ref 11.5–14.5)
EST. GFR  (AFRICAN AMERICAN): >60 ML/MIN/1.73 M^2
EST. GFR  (NON AFRICAN AMERICAN): 56.4 ML/MIN/1.73 M^2
FLUBV RNA NPH QL NAA+NON-PROBE: NOT DETECTED
GLUCOSE SERPL-MCNC: 170 MG/DL (ref 70–110)
HCT VFR BLD AUTO: 25 % (ref 37–48.5)
HGB BLD-MCNC: 7.5 G/DL (ref 12–16)
HPIV1 RNA NPH QL NAA+NON-PROBE: NOT DETECTED
HPIV2 RNA NPH QL NAA+NON-PROBE: NOT DETECTED
HPIV3 RNA NPH QL NAA+NON-PROBE: NOT DETECTED
HPIV4 RNA NPH QL NAA+NON-PROBE: NOT DETECTED
HUMAN METAPNEUMOVIRUS: NOT DETECTED
IMM GRANULOCYTES # BLD AUTO: 0.03 K/UL (ref 0–0.04)
IMM GRANULOCYTES NFR BLD AUTO: 0.5 % (ref 0–0.5)
INFLUENZA A (SUBTYPES H1,H1-2009,H3): NOT DETECTED
LYMPHOCYTES # BLD AUTO: 1.1 K/UL (ref 1–4.8)
LYMPHOCYTES NFR BLD: 18.9 % (ref 18–48)
MAGNESIUM SERPL-MCNC: 2.2 MG/DL (ref 1.6–2.6)
MCH RBC QN AUTO: 27 PG (ref 27–31)
MCHC RBC AUTO-ENTMCNC: 30 G/DL (ref 32–36)
MCV RBC AUTO: 90 FL (ref 82–98)
MONOCYTES # BLD AUTO: 0.9 K/UL (ref 0.3–1)
MONOCYTES NFR BLD: 15.4 % (ref 4–15)
MYCOPLASMA PNEUMONIAE: NOT DETECTED
NEUTROPHILS # BLD AUTO: 3.6 K/UL (ref 1.8–7.7)
NEUTROPHILS NFR BLD: 59.4 % (ref 38–73)
NRBC BLD-RTO: 0 /100 WBC
PHOSPHATE SERPL-MCNC: 3.2 MG/DL (ref 2.7–4.5)
PLATELET # BLD AUTO: 478 K/UL (ref 150–350)
PMV BLD AUTO: 9.6 FL (ref 9.2–12.9)
POCT GLUCOSE: 161 MG/DL (ref 70–110)
POCT GLUCOSE: 169 MG/DL (ref 70–110)
POCT GLUCOSE: 203 MG/DL (ref 70–110)
POCT GLUCOSE: 269 MG/DL (ref 70–110)
POTASSIUM SERPL-SCNC: 3.4 MMOL/L (ref 3.5–5.1)
PROT SERPL-MCNC: 7.2 G/DL (ref 6–8.4)
RBC # BLD AUTO: 2.78 M/UL (ref 4–5.4)
RESPIRATORY INFECTION PANEL SOURCE: NORMAL
RSV RNA NPH QL NAA+NON-PROBE: NOT DETECTED
RV+EV RNA NPH QL NAA+NON-PROBE: NOT DETECTED
SODIUM SERPL-SCNC: 144 MMOL/L (ref 136–145)
WBC # BLD AUTO: 6.03 K/UL (ref 3.9–12.7)

## 2020-02-10 PROCEDURE — 94640 AIRWAY INHALATION TREATMENT: CPT

## 2020-02-10 PROCEDURE — 25000003 PHARM REV CODE 250: Performed by: STUDENT IN AN ORGANIZED HEALTH CARE EDUCATION/TRAINING PROGRAM

## 2020-02-10 PROCEDURE — 97535 SELF CARE MNGMENT TRAINING: CPT

## 2020-02-10 PROCEDURE — 99233 SBSQ HOSP IP/OBS HIGH 50: CPT | Mod: ,,, | Performed by: PHYSICIAN ASSISTANT

## 2020-02-10 PROCEDURE — 99232 PR SUBSEQUENT HOSPITAL CARE,LEVL II: ICD-10-PCS | Mod: ,,, | Performed by: INTERNAL MEDICINE

## 2020-02-10 PROCEDURE — 63600175 PHARM REV CODE 636 W HCPCS: Performed by: STUDENT IN AN ORGANIZED HEALTH CARE EDUCATION/TRAINING PROGRAM

## 2020-02-10 PROCEDURE — 97110 THERAPEUTIC EXERCISES: CPT | Mod: CQ

## 2020-02-10 PROCEDURE — 36415 COLL VENOUS BLD VENIPUNCTURE: CPT

## 2020-02-10 PROCEDURE — 92526 ORAL FUNCTION THERAPY: CPT

## 2020-02-10 PROCEDURE — 83735 ASSAY OF MAGNESIUM: CPT

## 2020-02-10 PROCEDURE — 99900035 HC TECH TIME PER 15 MIN (STAT)

## 2020-02-10 PROCEDURE — 97530 THERAPEUTIC ACTIVITIES: CPT | Mod: CQ

## 2020-02-10 PROCEDURE — S0030 INJECTION, METRONIDAZOLE: HCPCS | Performed by: STUDENT IN AN ORGANIZED HEALTH CARE EDUCATION/TRAINING PROGRAM

## 2020-02-10 PROCEDURE — 94761 N-INVAS EAR/PLS OXIMETRY MLT: CPT

## 2020-02-10 PROCEDURE — 80053 COMPREHEN METABOLIC PANEL: CPT

## 2020-02-10 PROCEDURE — 99232 SBSQ HOSP IP/OBS MODERATE 35: CPT | Mod: ,,, | Performed by: INTERNAL MEDICINE

## 2020-02-10 PROCEDURE — 84100 ASSAY OF PHOSPHORUS: CPT

## 2020-02-10 PROCEDURE — 25000242 PHARM REV CODE 250 ALT 637 W/ HCPCS: Performed by: INTERNAL MEDICINE

## 2020-02-10 PROCEDURE — 99233 PR SUBSEQUENT HOSPITAL CARE,LEVL III: ICD-10-PCS | Mod: ,,, | Performed by: PHYSICIAN ASSISTANT

## 2020-02-10 PROCEDURE — 11000001 HC ACUTE MED/SURG PRIVATE ROOM

## 2020-02-10 PROCEDURE — 63600175 PHARM REV CODE 636 W HCPCS: Performed by: INTERNAL MEDICINE

## 2020-02-10 PROCEDURE — 85025 COMPLETE CBC W/AUTO DIFF WBC: CPT

## 2020-02-10 PROCEDURE — 25000003 PHARM REV CODE 250: Performed by: INTERNAL MEDICINE

## 2020-02-10 RX ORDER — AMOXICILLIN 250 MG
1 CAPSULE ORAL DAILY
Status: DISCONTINUED | OUTPATIENT
Start: 2020-02-10 | End: 2020-02-25 | Stop reason: HOSPADM

## 2020-02-10 RX ORDER — CIPROFLOXACIN 500 MG/1
500 TABLET ORAL EVERY 12 HOURS
Status: DISCONTINUED | OUTPATIENT
Start: 2020-02-10 | End: 2020-02-25 | Stop reason: HOSPADM

## 2020-02-10 RX ORDER — POLYETHYLENE GLYCOL 3350 17 G/17G
17 POWDER, FOR SOLUTION ORAL DAILY
Status: DISCONTINUED | OUTPATIENT
Start: 2020-02-10 | End: 2020-02-25 | Stop reason: HOSPADM

## 2020-02-10 RX ORDER — METRONIDAZOLE 500 MG/1
500 TABLET ORAL EVERY 8 HOURS
Status: DISCONTINUED | OUTPATIENT
Start: 2020-02-10 | End: 2020-02-25 | Stop reason: HOSPADM

## 2020-02-10 RX ADMIN — INSULIN ASPART 6 UNITS: 100 INJECTION, SOLUTION INTRAVENOUS; SUBCUTANEOUS at 04:02

## 2020-02-10 RX ADMIN — METOPROLOL TARTRATE 25 MG: 25 TABLET ORAL at 06:02

## 2020-02-10 RX ADMIN — AMLODIPINE BESYLATE 10 MG: 10 TABLET ORAL at 09:02

## 2020-02-10 RX ADMIN — METRONIDAZOLE 500 MG: 500 INJECTION, SOLUTION INTRAVENOUS at 04:02

## 2020-02-10 RX ADMIN — METOPROLOL TARTRATE 25 MG: 25 TABLET ORAL at 09:02

## 2020-02-10 RX ADMIN — CIPROFLOXACIN HYDROCHLORIDE 500 MG: 500 TABLET, FILM COATED ORAL at 11:02

## 2020-02-10 RX ADMIN — LEVOTHYROXINE SODIUM 200 MCG: 100 TABLET ORAL at 05:02

## 2020-02-10 RX ADMIN — METOPROLOL TARTRATE 25 MG: 25 TABLET ORAL at 02:02

## 2020-02-10 RX ADMIN — VANCOMYCIN HYDROCHLORIDE 1250 MG: 1.25 INJECTION, POWDER, LYOPHILIZED, FOR SOLUTION INTRAVENOUS at 04:02

## 2020-02-10 RX ADMIN — DOCUSATE SODIUM - SENNOSIDES 1 TABLET: 50; 8.6 TABLET, FILM COATED ORAL at 11:02

## 2020-02-10 RX ADMIN — POLYETHYLENE GLYCOL 3350 17 G: 17 POWDER, FOR SOLUTION ORAL at 11:02

## 2020-02-10 RX ADMIN — IPRATROPIUM BROMIDE AND ALBUTEROL SULFATE 3 ML: .5; 3 SOLUTION RESPIRATORY (INHALATION) at 03:02

## 2020-02-10 RX ADMIN — METRONIDAZOLE 500 MG: 500 TABLET ORAL at 03:02

## 2020-02-10 NOTE — PROGRESS NOTES
[facility]  Infectious Disease  Progress Note    Patient Name: Jeni Chacon  MRN: [id number]  Admission Date: 2/7/2020  Length of Stay: 3 days  Attending Physician: Tiara Killian MD  Primary Care Provider: Primary Doctor No    Isolation Status: No active isolations  Assessment/Plan:      * Abscess of sigmoid colon  - ? Cause - infected diverticulum postulated as cause  - ? Could  shunt distal catheter could be in area draining causing fluid collection - rec discuss with radiology to see if is the case - per dw IR shunt in CARRI quadrant not near abscess  - appears improved on most recent CT - IR does not rec intervention  - fu asap with GI as oupt as needs consideration for colonoscopy to check for underlying pathology as cause of abscess  - no GI complaints  - on Vanc, Cipro and Flagyl - rec continue for 2 weeks and re-image - if resolved, stop abx  - going to SNF per primary team but if goes to LTAC, rec consult ID to follow.  - FU in 2 weeks in ID clinic with CT abd with contrast  - weekly vanc trough, CBC and CMP while on IV abx - vanc trough goal 15-20 - please have pharmacy manage vanc at SNF      MSSA bacteremia  - 1/20/20 3/3 bottles with MSSA  - BCXs 1/22 NG  - ? Source  - concern as patient has  shunt   - rec NSGY consult and reports OSH tapped shunt and cultures negative - reviewed in care everywhere and no growth on culture and glucose elevated  -stable non septic  - continue vanc for now for MSSA and enterococcal coverage for abd fluid collection  - rec 4 weeks of Vanc from 1st neg culture 1/22 - EOC 2/20      Anticipated Disposition: tbd    Thank you for your consult. I will sign off. Please contact us if you have any additional questions.    ROSANNE Bailey  Infectious Disease  [facility]    Subjective:     Principal Problem:Abscess of sigmoid colon    HPI: H&P taken from chart as patient unable to give reliable history:    Patient is a 74 year old  female with medical history significant for hypothyroidism, HTN, DMII with neuropathy, tobacco abuse (with no diagnosis of COPD), and history of CVA s/p  shunt who initially presented to [facility] with complaints of SOB and abdominal pain. Although patient is oriented to person, time, and place, she still is having some odd behavior. So I gathered her history from her, her medical chart, and from calling her daughter. She reports that late January she had been having abdominal pain with associated SOB for a few weeks; despite a heavy smoking history, family reports that she has never had SOB or a diagnosis of COPD. She reports no alleviating or aggravating factors, just that the pain was sharp and diffuse.   She was admitted on 1/19 and had a complicated hospital course. Briefly, patient underwent CT abdomen on 1/20 which showed evidence of cholecystitis as well as an sigmoid colon abscess. She had a percutaneous cholecystostomy tube placed on 1/24. The abscess was not drained as IR felt it was unsafe to do so. During her hospital course, her blood cultures were positive for MSSA and she had evidence of UTI. Her antibiotics were changed a few times, but she currently is on cipro and flagyl for intraabdominal infection prophylaxis and on vanc and rifampin for MSSA bacteremia. KESHIA showed inferobasal hypokinesis and EF of 55%; no vegetation noted.  In addition to this she had O2 saturation in the mid-80s. Initially she was placed on bipap and treated for COPD exacerbation; she completed a course of steroids and got one dose of Lasix. However her respiratory status worsening and she was placed in MICU and required intubation from 1/20-1/31. She was Bipap dependent following extubation until 2/6 and is now on nasal cannula; of note, she did require pressors during MICU stay. On top of all this, patient also developed paroxysmal a fib. CHADSVASC elevated however she could not be anticoagulated due to gross  hematuria requiring blood transfusion. Patient transferred for second IR opinion regarding drainage of sigmoid abscess and out of confusion and frustration from the multiple consultants at [facility].     Patient admitted and ID consulted for MSSA bacteremia and abdominal abscess.  Patient on Vanc/Cipro/Flagyl.  She has been afebrile and WBC WNL. BCXS NGTD.  She says her  shunt was put in many years ago.  The patient denies any recent fever, chills, ABD, night sweats or sweats.  PICC in place.     CT ABD/Pelvis repeated:  - Interval decrease in of the small pelvic collection adjacent to the sigmoid colon.  This measures 3.2 x 3.0 x 2.4 cm today (previously 3.5 x 5.0 x 5.3 cm). -Given small size and interposed bowel and bladder, percutaneous drainage is not recommended at this time.  There is persistent mild sigmoid wall thickening and mesenteric stranding.  - The gallbladder is decompressed with a small bore catheter within the gallbladder.  Small amount of pericholecystic fluid.  - Mild hydronephrosis and hydroureter on the right with distal urothelial enhancement.  Degree of hydronephrosis may be slightly improved from prior..  While findings may be reactive due to the previously described pelvic processes, a urothelial neoplasm can not be definitively excluded.  Cystoscopy may be indicated upon resolution of the patient's acute presentation..  - Similar size of the thin walled cystic structure posterior to the stomach which now contains a small amount of gas.  It is uncertain whether this represents a gastric diverticulum  - Bibasilar atelectasis with partially evaluated left lower lung consolidation which could be secondary to aspiration or pneumonia.    IR defers aspiration.     Interval History: No AEON. AFebrile and WBC WNL.  DOing ok - no complaints.  The patient denies any recent fever, chills, or sweats.      Review of Systems   Constitutional: Negative for activity change, chills, diaphoresis and  fever.   Respiratory: Negative for cough, shortness of breath and wheezing.    Cardiovascular: Negative for chest pain.   Gastrointestinal: Negative for abdominal pain, constipation, diarrhea, nausea and vomiting.   Genitourinary: Negative for dysuria, frequency and urgency.   Neurological: Negative for dizziness.   Hematological: Does not bruise/bleed easily.     Objective:     Vital Signs (Most Recent):  Temp: 98.2 °F (36.8 °C) (02/10/20 0813)  Pulse: 101 (02/10/20 0813)  Resp: 20 (02/10/20 0813)  BP: (!) 147/68 (02/10/20 0813)  SpO2: (!) 94 % (02/10/20 0813) Vital Signs (24h Range):  Temp:  [97.2 °F (36.2 °C)-98.4 °F (36.9 °C)] 98.2 °F (36.8 °C)  Pulse:  [] 101  Resp:  [16-22] 20  SpO2:  [88 %-97 %] 94 %  BP: (117-147)/(63-71) 147/68     Weight: 77.6 kg (171 lb 1.2 oz)  Body mass index is 28.47 kg/m².    Estimated Creatinine Clearance: 52.3 mL/min (based on SCr of 1 mg/dL).    Physical Exam   Constitutional: She appears well-developed and well-nourished. No distress.       HENT:   Head: Normocephalic and atraumatic.   Cardiovascular: Normal rate, regular rhythm and normal heart sounds. Exam reveals no gallop and no friction rub.   No murmur heard.  Pulmonary/Chest: Effort normal and breath sounds normal. No stridor. No respiratory distress. She has no wheezes. She has no rales.   Abdominal: Soft. Bowel sounds are normal. She exhibits no distension and no mass. There is no tenderness. There is no rebound and no guarding.   Musculoskeletal: She exhibits no edema.   Neurological: She is alert.   O to person   Skin: Skin is warm and dry. She is not diaphoretic.       Significant Labs:   Blood Culture:   Recent Labs   Lab 02/07/20  1847   LABBLOO No Growth to date  No Growth to date  No Growth to date  No Growth to date  No Growth to date  No Growth to date     CBC:   Recent Labs   Lab 02/09/20  0554 02/10/20  0400   WBC 7.37 6.03   HGB 8.1* 7.5*   HCT 26.3* 25.0*   * 478*     CMP:   Recent Labs    Lab 02/09/20  0554 02/10/20  0400   * 144   K 3.3* 3.4*    109   CO2 28 26   * 170*   BUN 7* 6*   CREATININE 1.0 1.0   CALCIUM 8.1* 7.8*   PROT 7.4 7.2   ALBUMIN 2.0* 2.0*   BILITOT 0.3 0.3   ALKPHOS 75 76   AST 13 15   ALT 8* 9*   ANIONGAP 10 9   EGFRNONAA 56.4* 56.4*     Urine Culture: No results for input(s): LABURIN in the last 4320 hours.  Urine Studies:   Recent Labs   Lab 02/08/20  1050   COLORU Yellow   APPEARANCEUA Cloudy*   PHUR 7.0   SPECGRAV 1.005   PROTEINUA Negative   GLUCUA Negative   KETONESU Negative   BILIRUBINUA Negative   OCCULTUA 3+*   NITRITE Negative   LEUKOCYTESUR 3+*   RBCUA 2   WBCUA 2   BACTERIA Many*   SQUAMEPITHEL 1     Wound Culture: No results for input(s): LABAERO in the last 4320 hours.  All pertinent labs within the past 24 hours have been reviewed.    Significant Imaging: I have reviewed all pertinent imaging results/findings within the past 24 hours.   CT Abdomen Pelvis With Contrast [[id number]] Resulted: 02/08/20 1351   Order Status: Completed Updated: 02/08/20 1354   Narrative:     EXAMINATION:  CT ABDOMEN PELVIS WITH CONTRAST    CLINICAL HISTORY:  to re-evaluate the sigmoid colon abscess;    TECHNIQUE:  Low dose axial images, sagittal and coronal reformations were obtained from the lung bases to the pubic symphysis following the IV administration of 75 mL of Omnipaque 350 and the oral administration of 30 mL of Omnipaque 350.    COMPARISON:  Outside CT 01/20/2023    FINDINGS:  Limited evaluations of the lung bases show atelectatic change.  Questionable consolidation in the left lower lobe dependently.  Visualized heart shows coronary atherosclerosis.    Liver is normal in size without focal mass or biliary ductal dilatation.  No biliary ductal dilatation.  The gallbladder is decompressed and contains gallstones.  There is a small percutaneous drain within the gallbladder.  Small amount of pericholecystic fluid..  There is small amount of pericholecystic  fluid identified.    The spleen, adrenal glands, and pancreas show no focal abnormality.    The left kidney is normal in size without solid mass or hydronephrosis.  There is a large right peripelvic renal cyst measuring roughly 7.2 cm with minimal peripheral enhancement and no mural nodularity..  Mild right-sided hydronephrosis and hydroureter with mild distal urothelial enhancement of the distal right ureter.  Urinary bladder shows mild wall thickening at the dome likely reactive due to the adjacent small fluid collection.    The gastrointestinal tract shows no evidence of obstruction.  Mild sigmoid wall thickening and mesenteric stranding.  At the inferior aspect of the sigmoid colon, superior to the bladder dome there is a 3.2 x 2.9 x 2.4 cm collection (previously reported 3.5 x 5.0 x 5.3 cm).  There is also a thin walled gas and fluid collection at the posterior aspect of the stomach measuring roughly 5.7 by 3.4 by 3.5 cm.  There is an enteric tube is within the distal stomach.  Intra-abdominal portion of a suspected ventriculoperitoneal shunt is identified and appears continuous.    The uterus appears grossly unremarkable.    Vascular structures show moderate atherosclerosis.  No acute fracture or destructive lesion.   Impression:       Interval decrease in of the small pelvic collection adjacent to the sigmoid colon.  This measures 3.2 x 3.0 x 2.4 cm today (previously 3.5 x 5.0 x 5.3 cm).  Given small size and interposed bowel and bladder, percutaneous drainage is not recommended at this time.  There is persistent mild sigmoid wall thickening and mesenteric stranding.    The gallbladder is decompressed with a small bore catheter within the gallbladder.  Small amount of pericholecystic fluid.    Mild hydronephrosis and hydroureter on the right with distal urothelial enhancement.  Degree of hydronephrosis may be slightly improved from prior..  While findings may be reactive due to the previously described pelvic  processes, a urothelial neoplasm can not be definitively excluded.  Cystoscopy may be indicated upon resolution of the patient's acute presentation..    Similar size of the thin walled cystic structure posterior to the stomach which now contains a small amount of gas.  It is uncertain whether this represents a gastric diverticulum    Bibasilar atelectasis with partially evaluated left lower lung consolidation which could be secondary to aspiration or pneumonia.      Electronically signed by: Carl Alegria MD  Date: 02/08/2020  Time: 13:51   X-Ray Chest 1 View [[id number]] Resulted: 02/08/20 0630   Order Status: Completed Updated: 02/08/20 0632   Narrative:     EXAMINATION:  XR CHEST 1 VIEW    CLINICAL HISTORY:  increased work of breathing;    TECHNIQUE:  Single frontal view of the chest was performed.    COMPARISON:  None.    FINDINGS:  Enteric catheter crosses the diaphragm with distal aspect not seen.  Right-sided PICC line with tip projecting over the SVC.  Mediastinal structures are midline.  Hilar contours are unremarkable.  Cardiac silhouette is magnified by AP technique.  Lung volumes are low but symmetric.  There is patchy increased attenuation within the left lower lung zone, possibly atelectasis or consolidation.  No pneumothorax.  No large pleural effusions.  No free air beneath the diaphragm.  Degenerative changes of the spine and shoulders noted.   Impression:       1. Hypoventilatory lung changes with patchy increased attenuation within the left lower lung zone which could relate to atelectasis or consolidation.  No pneumothorax or large pleural effusion.      Electronically signed by: Panchito Joyner MD  Date: 02/08/2020  Time: 06:30   Imaging History     2020  Date Procedure Name PACS Link Status Accession Number Location   02/08/20 10:43 AM CT Abdomen Pelvis With Contrast  Images Final [id number] [facility]   02/07/20 08:34 PM X-Ray Chest 1 View  Images Final [id number] [facility]

## 2020-02-10 NOTE — PROGRESS NOTES
CT ABD with contrast ordered for 2 weeks.  MAs to schedule and have prior to ID fu in 2 weeks.    Riaz Sofia PA-C MPH

## 2020-02-10 NOTE — PLAN OF CARE
CM at bedside, patient confused, CM called patient's daughter Jeannine ([phone]). CM explained the role of the CM/SW in assisting with discharge planning. Information in medical records verified with patient's daughter . Per daughter Patient lives with her  in a single story home, no home DME utilized. Per Medical team kaylee is needing LTAC placement. Plan discussed with patients daughter. CM name and number on board.      PCP: Chilango Paredes MD       PHARM:   [id number] - [place] - [address]  [address] [address]  Phone: [phone] Fax: [phone]        Payor: [employer] MANAGED MEDICARE / Plan: [employer] MEDICARE HMO / Product Type: Capitation /          02/10/20 1423   Discharge Assessment   Assessment Type Discharge Planning Assessment   Confirmed/corrected address and phone number on facesheet? Yes   Assessment information obtained from? Medical Record;Other  (Daughter )   Expected Length of Stay (days) 5   Communicated expected length of stay with patient/caregiver yes   Prior to hospitilization cognitive status: Not Oriented to Time;Not Oriented to Place   Prior to hospitalization functional status: Needs Assistance   Current cognitive status: Not Oriented to Place;Not Oriented to Time   Current Functional Status: Needs Assistance   Lives With child(nahid), adult   Able to Return to Prior Arrangements no   Is patient able to care for self after discharge? No   Patient's perception of discharge disposition long-term acute care facility (LTAC)   Patient currently being followed by outpatient case management? No   Patient currently receives any other outside agency services? No   Equipment Currently Used at Home none   Do you have any problems affording any of your prescribed medications? No   Is the patient taking medications as prescribed? yes   Does the patient have transportation home? Yes   Transportation Anticipated health plan transportation   Does the patient  receive services at the [facility]? No   Discharge Plan A Long-term acute care facility (LTAC)   Discharge Plan B Skilled Nursing Facility   DME Needed Upon Discharge  none   Patient/Family in Agreement with Plan yes       Starla Rodas RN, BSN     Ext [phone]

## 2020-02-10 NOTE — ASSESSMENT & PLAN NOTE
- 1/20/20 3/3 bottles with MSSA  - BCXs 1/22 NG  - ? Source  - concern as patient has  shunt   - rec NSGY consult and reports OSH tapped shunt and cultures negative - reviewed in care everywhere and no growth on culture and glucose elevated  -stable non septic  - continue vanc for now for MSSA and enterococcal coverage for abd fluid collection  - rec 4 weeks of Vanc from 1st neg culture 1/22 - EOC 2/20

## 2020-02-10 NOTE — ASSESSMENT & PLAN NOTE
- ? Cause - infected diverticulum postulated as cause  - ? Could  shunt distal catheter could be in area draining causing fluid collection - rec discuss with radiology to see if is the case  - appears improved on most recent CT - IR does not rec intervention  - consider GI consult as may need consideration for colonoscopy  - no GI complaints  - on Vanc, Cipro and Flagyl

## 2020-02-10 NOTE — PROGRESS NOTES
[facility] Hospital Medicine  Progress Note    Patient Name: Jeni Chacon  MRN: [id number]  Patient Class: IP- Inpatient   Admission Date: 2/7/2020  Length of Stay: 3 days  Attending Physician: Tiara Killian MD  Primary Care Provider: Primary Doctor [facility] Medicine Team: [facility] HOSP MED 2 Chani Mckenzie MD    Subjective:     Principal Problem:Abscess of sigmoid colon        HPI:  Patient is a 74 year old female with medical history significant for hypothyroidism, HTN, DMII with neuropathy, tobacco abuse (with no diagnosis of COPD), and history of CVA s/p  shunt who initially presented to [place] with complaints of SOB and abdominal pain. Although patient is oriented to person, time, and place, she still is having some odd behavior. So I gathered her history from her, her medical chart, and from calling her daughter. She reports that late January she had been having abdominal pain with associated SOB for a few weeks; despite a heavy smoking history, family reports that she has never had SOB or a diagnosis of COPD. She reports no alleviating or aggravating factors, just that the pain was sharp and diffuse.   She was admitted on 1/19 and had a complicated hospital course. Briefly, patient underwent CT abdomen on 1/20 which showed evidence of cholecystitis as well as an sigmoid colon abscess. She had a percutaneous cholecystostomy tube placed on 1/24. The abscess was not drained as IR felt it was unsafe to do so. During her hospital course, her blood cultures were positive for MSSA and she had evidence of UTI. Her antibiotics were changed a few times, but she currently is on cipro and flagyl for intraabdominal infection prophylaxis and on vanc and rifampin for MSSA bacteremia. KESHIA showed inferobasal hypokinesis and EF of 55%; no vegetation noted.  In addition to this she had O2 saturation in the mid-80s. Initially she was placed on bipap and treated for COPD exacerbation; she  completed a course of steroids and got one dose of Lasix. However her respiratory status worsening and she was placed in MICU and required intubation from 1/20-1/31. She was Bipap dependent following extubation until 2/6 and is now on nasal cannula; of note, she did require pressors during MICU stay. On top of all this, patient also developed paroxysmal a fib. CHADSVASC elevated however she could not be anticoagulated due to gross hematuria requiring blood transfusion. Patient transferred for second IR opinion regarding drainage of sigmoid abscess and out of confusion and frustration from the multiple consultants at [facility].     At the time of my exam, patient was tachycardic, tachypneic, and mildly hypertension. She was AAO x3, however denied all symptoms on review of systems despite appear to have increased work of breathing. She reports that this is her baseline breathing and that she is on 3 L of O2 at home, however she exhibits odd behavior like trying to squirm out of the bed, or being unable to sign blood consent because it does not look right. Of note, patient was transferred with PICC line, however per chart review, it appears that her bacteremia resulted from cultures on 1/20, and the first vanc trough was taken on 1/23. Follow up culture have been negative and patient reports that she got her PICC line more recently. In addition, patient was transferred with NG tube. She reports that she is able to swallow and that the last time she ate was yesterday, however per nursing hand off, patient was to get formal speech eval which was not done due to transfer.      Overview/Hospital Course:  Patient admitted with sigmoid abscess for IR drainage, evaluated by IR and per repeat imaging, size reduced and no intervention. Also came with a percutaneous cholecystostomy tube placed for acute cholecystitis.  She is also evaluated by ID and continuing Vancomycin, Ciprofloxacin and Flagyl. Speech evaluated, to  continue to be NPO but okay for meds and ice chips, to continue to be assessed by them.     Interval History: NAEON. Patient is oriented and feeling better. Continuing abx and planning for d/c to LTAC.    Review of Systems   Constitutional: Negative for chills and fever.   Cardiovascular: Negative for chest pain.   Gastrointestinal: Negative for abdominal pain, constipation and vomiting.     Objective:     Vital Signs (Most Recent):  Temp: 98.2 °F (36.8 °C) (02/10/20 0813)  Pulse: 101 (02/10/20 0813)  Resp: 20 (02/10/20 0813)  BP: (!) 147/68 (02/10/20 0813)  SpO2: (!) 94 % (02/10/20 0813) Vital Signs (24h Range):  Temp:  [97.2 °F (36.2 °C)-98.4 °F (36.9 °C)] 98.2 °F (36.8 °C)  Pulse:  [] 101  Resp:  [16-22] 20  SpO2:  [88 %-97 %] 94 %  BP: (117-147)/(63-71) 147/68     Weight: 77.6 kg (171 lb 1.2 oz)  Body mass index is 28.47 kg/m².    Intake/Output Summary (Last 24 hours) at 2/10/2020 1137  Last data filed at 2/10/2020 0500  Gross per 24 hour   Intake 400 ml   Output 900 ml   Net -500 ml      Physical Exam   Constitutional: She appears well-developed and well-nourished.   HENT:   Head: Normocephalic and atraumatic.   Oral mucosa, lips - Dry   Eyes: Pupils are equal, round, and reactive to light. EOM are normal.   Neck: Normal range of motion. No tracheal deviation present.   Cardiovascular: Normal heart sounds.   No murmur heard.  Pulmonary/Chest: She has no wheezes. She has rales.   On NC   Abdominal: Soft. Bowel sounds are normal. There is no tenderness.   Percutaneous drain   Musculoskeletal: Normal range of motion. She exhibits no edema.   Neurological: She is alert.       Significant Labs: All pertinent labs within the past 24 hours have been reviewed.    Significant Imaging: I have reviewed all pertinent imaging results/findings within the past 24 hours.      Assessment/Plan:      * Abscess of sigmoid colon  - Patient with evidence of  an abscess of 3.5 x 5 x 5.3 cm insinuated between the sigmoid colon  and the dome of the urinary bladder, possibly in the setting of sigmoid diverticulitis  - Not drained by IR at the time as it was deemed unsafe to do so  - Family wanted transfer for IR second opinion.   - Evaluated by IR, decreased size of the sigmoid collection, intervention deferred for now  - Blood cultures x2 collected, per outside hospital records, blood cultures have been clear since 1/20 ( See MSSA bacteremia)  - Continue cipro flagyl for now, lactate wnl  -4 weeks IV ABX per ID, to be finished at LTAC      Dysphagia  -diet: pureed w/ nectar thick liquids        Paroxysmal A-fib  - Patient with reports of new onset paroxysmal afib during course of hospital stay  - Appears to be in sinus rhythm at the time of initial exam  - Continue metoprolol. Patient was getting 25 mg QID prior to transfer. Unclear if this was out of caution for hypotension or if increased frequency was needed for continued rate control. Will keep at this dose for now as patient may have received some doses prior to transfer. Consider switching to metoprolol succinate in the morning.   - Patient not anticoagulated at this time due to significant hematuria prior to transfer  * [id number] Score for Atrial Fibrillation Stroke Ris  RESULT SUMMARY:  7 points  Stroke risk was 11.2% per year in >90,000 patients (the [place] Atrial Fibrillation Cohort Study) and 15.7% risk of stroke/TIA/systemic embolism  INPUTS:  Age --> 1 = 65-74  Sex --> 1 = Female  CHF history --> 0 = No  Hypertension history --> 1 = Yes  Stroke/TIA/thromboembolism history --> 2 = Yes  Vascular disease history (prior MI, peripheral artery disease, or aortic plaque) --> 1 = Yes  Diabetes history --> 1 = Yes  - Patient should be anticoagulated pending stable hemoglobin          Hematuria  Blood loss anemia    - Patient with complicated hospital course including significant hematuria leading to blood loss anemia and need for blood transfusion.   - Hg stable at 8.5 for now.  Patient has purewick, no signs of gross hematuria. INR 1.1  - type and screen ordered, blood consent signed  - Will keep cbc at daily frequency for now. If Hg with significant drop in the morning, consider increasing frequency and transfuse for Hg<7  - in terms of anticoagulation (see paroxysmal afib), hold for now. If Hg remains stable, consider starting anticoagulation        COPD (chronic obstructive pulmonary disease)  Respiratory failure with hypoxia  Respiratory distress    - Patient treated for COPD exacerbation with full course of steroids and breathing treatments prior to transfer  - Patient does not have formal diagnosis of COPD but reports heavy smoking history at a pack a day for many years  - Patient also reports using 3 L O2 at home, but daughter is not sure. Unspecified chronicity of hypoxic respiratory failure  - Will continue breathing treatment q6 and incentive spirometry  - Will follow up vbg as patient has apparent increased work of breathing.  - Patient is not on any inhalers at home, she will likely need rescue inhaler and inhaled corticosteroid at minimum at discharge       MSSA bacteremia  - Patient with evidence of MSSA bacteremia on blood cultures taken on 1/20. First vanc trough drawn on 1/23  - Sensitivities show that staph aureus is sensitive to cefazolin and oxacillin. However patient is still on vanc with documented allergy to penicillins. However patient is unclear as to what reaction she gets and daughter is unclear as well  - In addition, it appears that patient would have received full course of antibiotic for bacteremia as no further blood cultures were positive for MSSA after 1/20 cultures  - Blood cultures repeated  - Evaluated by ID appreciate recs, continue Vancomycin  Off note  shunt was tapped by MANDEEP in OSH on 1/25 with negative studies  Discussed with MANDEEP, as patient does not have meningitis,  shunt is not the source of MSSA and if menigitis suspected to first  evaluate with LP.         Discharge planning issues  Debility  Overweight    -Arranging d/c to LTAC      Cholecystitis  - Ct scan done on 1/20 showed gallbladder to be markedly distended with sludge and stones, and with an indistinct wall. There was some pericholecystic inflammation in the posterior part. She had a percutaneous cholecystostomy tube placed on 1/24 for the acute cholecystitis  - Drain still in place  - Will continue cipro flagyl for intraabdominal prophylaxis as patient also has sigmoid colon abscess       Type 2 diabetes mellitus, without long-term current use of insulin  - HA1c 7.3 last October   - Repeat ordered  - Patient was on 6 units of insulin basal plus sliding scale prior to transfer, she does not use insulin at home  - Will keep sliding scale insulin for now. Calculate daily insulin needs, place on basal if greater glucose control is needed; no longer being treated with steroids      Hypothyroid  - Continue home levothyroxine.  - 2/7 thyroid labs show TSH elevated at 32, but T4 wnl of 1.05    Essential hypertension  - Patient takes amlodipine and hydrochlorothiazide at home  - metoprolol started for rate control for new onset afib prior to transfer  - Will resume metoprolol and amlodipine as patient is mildly hypertensive  - Resume hydrochlorothiazide as needed        VTE Risk Mitigation (From admission, onward)         Ordered     Place sequential compression device  Until discontinued      02/08/20 0233     IP VTE HIGH RISK PATIENT  Once      02/07/20 1915                      Chani Mckenzie MD  Department of Hospital Medicine   [facility]

## 2020-02-10 NOTE — SUBJECTIVE & OBJECTIVE
Interval History: No AEON. AFebrile and WBC WNL.  DOing ok.  The patient denies any recent fever, chills, or sweats.      Review of Systems   Constitutional: Negative for activity change, chills, diaphoresis and fever.   Respiratory: Negative for cough, shortness of breath and wheezing.    Cardiovascular: Negative for chest pain.   Gastrointestinal: Negative for abdominal pain, constipation, diarrhea, nausea and vomiting.   Genitourinary: Negative for dysuria, frequency and urgency.   Neurological: Negative for dizziness.   Hematological: Does not bruise/bleed easily.     Objective:     Vital Signs (Most Recent):  Temp: 97.4 °F (36.3 °C) (02/09/20 1927)  Pulse: 90 (02/09/20 1927)  Resp: 16 (02/09/20 1927)  BP: 137/68 (02/09/20 1927)  SpO2: 97 % (02/09/20 1927) Vital Signs (24h Range):  Temp:  [97.2 °F (36.2 °C)-98.4 °F (36.9 °C)] 97.4 °F (36.3 °C)  Pulse:  [] 90  Resp:  [16-22] 16  SpO2:  [88 %-99 %] 97 %  BP: (126-137)/(58-73) 137/68     Weight: 77.6 kg (171 lb 1.2 oz)  Body mass index is 28.47 kg/m².    Estimated Creatinine Clearance: 52.3 mL/min (based on SCr of 1 mg/dL).    Physical Exam   Constitutional: She appears well-developed and well-nourished. No distress.       HENT:   Head: Normocephalic and atraumatic.   Cardiovascular: Normal rate, regular rhythm and normal heart sounds. Exam reveals no gallop and no friction rub.   No murmur heard.  Pulmonary/Chest: Effort normal and breath sounds normal. No stridor. No respiratory distress. She has no wheezes. She has no rales.   Abdominal: Soft. Bowel sounds are normal. She exhibits no distension and no mass. There is no tenderness. There is no rebound and no guarding.   Musculoskeletal: She exhibits no edema.   Neurological: She is alert.   O to person   Skin: Skin is warm and dry. She is not diaphoretic.       Significant Labs:   Blood Culture:   Recent Labs   Lab 02/07/20  1847   LABBLOO No Growth to date  No Growth to date  No Growth to date  No  Growth to date     CBC:   Recent Labs   Lab 02/08/20  0239 02/09/20  0554   WBC 8.42 7.37   HGB 7.5* 8.1*   HCT 24.5* 26.3*   * 439*     CMP:   Recent Labs   Lab 02/08/20  0239 02/09/20  0554    146*   K 3.7 3.3*    108   CO2 27 28   * 198*   BUN 10 7*   CREATININE 1.0 1.0   CALCIUM 8.0* 8.1*   PROT 7.3 7.4   ALBUMIN 2.0* 2.0*   BILITOT 0.4 0.3   ALKPHOS 84 75   AST 17 13   ALT 8* 8*   ANIONGAP 8 10   EGFRNONAA 56.4* 56.4*     Urine Culture: No results for input(s): LABURIN in the last 4320 hours.  Urine Studies:   Recent Labs   Lab 02/08/20  1050   COLORU Yellow   APPEARANCEUA Cloudy*   PHUR 7.0   SPECGRAV 1.005   PROTEINUA Negative   GLUCUA Negative   KETONESU Negative   BILIRUBINUA Negative   OCCULTUA 3+*   NITRITE Negative   LEUKOCYTESUR 3+*   RBCUA 2   WBCUA 2   BACTERIA Many*   SQUAMEPITHEL 1     Wound Culture: No results for input(s): LABAERO in the last 4320 hours.  All pertinent labs within the past 24 hours have been reviewed.    Significant Imaging: I have reviewed all pertinent imaging results/findings within the past 24 hours.   CT Abdomen Pelvis With Contrast [[id number]] Resulted: 02/08/20 1351   Order Status: Completed Updated: 02/08/20 1354   Narrative:     EXAMINATION:  CT ABDOMEN PELVIS WITH CONTRAST    CLINICAL HISTORY:  to re-evaluate the sigmoid colon abscess;    TECHNIQUE:  Low dose axial images, sagittal and coronal reformations were obtained from the lung bases to the pubic symphysis following the IV administration of 75 mL of Omnipaque 350 and the oral administration of 30 mL of Omnipaque 350.    COMPARISON:  Outside CT 01/20/2023    FINDINGS:  Limited evaluations of the lung bases show atelectatic change.  Questionable consolidation in the left lower lobe dependently.  Visualized heart shows coronary atherosclerosis.    Liver is normal in size without focal mass or biliary ductal dilatation.  No biliary ductal dilatation.  The gallbladder is decompressed and  contains gallstones.  There is a small percutaneous drain within the gallbladder.  Small amount of pericholecystic fluid..  There is small amount of pericholecystic fluid identified.    The spleen, adrenal glands, and pancreas show no focal abnormality.    The left kidney is normal in size without solid mass or hydronephrosis.  There is a large right peripelvic renal cyst measuring roughly 7.2 cm with minimal peripheral enhancement and no mural nodularity..  Mild right-sided hydronephrosis and hydroureter with mild distal urothelial enhancement of the distal right ureter.  Urinary bladder shows mild wall thickening at the dome likely reactive due to the adjacent small fluid collection.    The gastrointestinal tract shows no evidence of obstruction.  Mild sigmoid wall thickening and mesenteric stranding.  At the inferior aspect of the sigmoid colon, superior to the bladder dome there is a 3.2 x 2.9 x 2.4 cm collection (previously reported 3.5 x 5.0 x 5.3 cm).  There is also a thin walled gas and fluid collection at the posterior aspect of the stomach measuring roughly 5.7 by 3.4 by 3.5 cm.  There is an enteric tube is within the distal stomach.  Intra-abdominal portion of a suspected ventriculoperitoneal shunt is identified and appears continuous.    The uterus appears grossly unremarkable.    Vascular structures show moderate atherosclerosis.  No acute fracture or destructive lesion.   Impression:       Interval decrease in of the small pelvic collection adjacent to the sigmoid colon.  This measures 3.2 x 3.0 x 2.4 cm today (previously 3.5 x 5.0 x 5.3 cm).  Given small size and interposed bowel and bladder, percutaneous drainage is not recommended at this time.  There is persistent mild sigmoid wall thickening and mesenteric stranding.    The gallbladder is decompressed with a small bore catheter within the gallbladder.  Small amount of pericholecystic fluid.    Mild hydronephrosis and hydroureter on the right with  distal urothelial enhancement.  Degree of hydronephrosis may be slightly improved from prior..  While findings may be reactive due to the previously described pelvic processes, a urothelial neoplasm can not be definitively excluded.  Cystoscopy may be indicated upon resolution of the patient's acute presentation..    Similar size of the thin walled cystic structure posterior to the stomach which now contains a small amount of gas.  It is uncertain whether this represents a gastric diverticulum    Bibasilar atelectasis with partially evaluated left lower lung consolidation which could be secondary to aspiration or pneumonia.      Electronically signed by: Carl Alegria MD  Date: 02/08/2020  Time: 13:51   X-Ray Chest 1 View [[id number]] Resulted: 02/08/20 0630   Order Status: Completed Updated: 02/08/20 0632   Narrative:     EXAMINATION:  XR CHEST 1 VIEW    CLINICAL HISTORY:  increased work of breathing;    TECHNIQUE:  Single frontal view of the chest was performed.    COMPARISON:  None.    FINDINGS:  Enteric catheter crosses the diaphragm with distal aspect not seen.  Right-sided PICC line with tip projecting over the SVC.  Mediastinal structures are midline.  Hilar contours are unremarkable.  Cardiac silhouette is magnified by AP technique.  Lung volumes are low but symmetric.  There is patchy increased attenuation within the left lower lung zone, possibly atelectasis or consolidation.  No pneumothorax.  No large pleural effusions.  No free air beneath the diaphragm.  Degenerative changes of the spine and shoulders noted.   Impression:       1. Hypoventilatory lung changes with patchy increased attenuation within the left lower lung zone which could relate to atelectasis or consolidation.  No pneumothorax or large pleural effusion.      Electronically signed by: Panchito Joyner MD  Date: 02/08/2020  Time: 06:30   Imaging History     2020  Date Procedure Name PACS Link Status Accession Number Location   02/08/20  10:43 AM CT Abdomen Pelvis With Contrast  Images Final [id number] [facility]   02/07/20 08:34 PM X-Ray Chest 1 View  Images Final [id number] [facility]        DISPLAY PLAN FREE TEXT DISPLAY PLAN FREE TEXT DISPLAY PLAN FREE TEXT DISPLAY PLAN FREE TEXT DISPLAY PLAN FREE TEXT DISPLAY PLAN FREE TEXT DISPLAY PLAN FREE TEXT DISPLAY PLAN FREE TEXT DISPLAY PLAN FREE TEXT DISPLAY PLAN FREE TEXT DISPLAY PLAN FREE TEXT DISPLAY PLAN FREE TEXT DISPLAY PLAN FREE TEXT DISPLAY PLAN FREE TEXT DISPLAY PLAN FREE TEXT DISPLAY PLAN FREE TEXT DISPLAY PLAN FREE TEXT DISPLAY PLAN FREE TEXT DISPLAY PLAN FREE TEXT DISPLAY PLAN FREE TEXT DISPLAY PLAN FREE TEXT

## 2020-02-10 NOTE — PHARMACY MED REC
"Admission Medication Reconciliation - Pharmacy Consult Note    The home medication history was taken by Yue Carney Pharmacy Tech.     Based on information gathered and subsequent review by the clinical pharmacist, the items below may need attention.     You may go to "Admission" then "Reconcile Home Medications" tabs to review and/or act upon these items.     Potentially problematic discrepancies with current MAR  o Patient IS taking the following which was not ordered upon admit  o Hydalazine 10 mg PO BID  o HCTZ 12.5 mg PO daily    Potential issues to be addressed PRIOR TO DISCHARGE  o Patient last filled metformin in September 2019.  On a sulfonylurea as monotherapy.  Would consider discussing with patient and possibly switching back to or adding metformin    Please address this information as you see fit.  Feel free to contact us if you have any questions or require assistance.    Wyatt Frank, Pharm.D., BCPS  [id number]                    .    .            "
normal S1, S2 heard

## 2020-02-10 NOTE — PROGRESS NOTES
Pharmacist Intervention IV to PO Note    Jeni Chacon is a 74 y.o. female being treated with IV medication metronidazole     Patient Data:    Vital Signs (Most Recent):  Temp: 98.2 °F (36.8 °C) (02/10/20 0813)  Pulse: 101 (02/10/20 0813)  Resp: 20 (02/10/20 0813)  BP: (!) 147/68 (02/10/20 0813)  SpO2: (!) 94 % (02/10/20 0813)   Vital Signs (72h Range):  Temp:  [96.2 °F (35.7 °C)-100.1 °F (37.8 °C)]   Pulse:  []   Resp:  [16-22]   BP: (105-175)/(58-92)   SpO2:  [88 %-99 %]      CBC:  Recent Labs   Lab 02/08/20 0239 02/09/20  0554 02/10/20  0400   WBC 8.42 7.37 6.03   RBC 2.77* 2.91* 2.78*   HGB 7.5* 8.1* 7.5*   HCT 24.5* 26.3* 25.0*   * 439* 478*   MCV 88 90 90   MCH 27.1 27.8 27.0   MCHC 30.6* 30.8* 30.0*     CMP:     Recent Labs   Lab 02/08/20 0239 02/09/20  0554 02/10/20  0400   * 198* 170*   CALCIUM 8.0* 8.1* 7.8*   ALBUMIN 2.0* 2.0* 2.0*   PROT 7.3 7.4 7.2    146* 144   K 3.7 3.3* 3.4*   CO2 27 28 26    108 109   BUN 10 7* 6*   CREATININE 1.0 1.0 1.0   ALKPHOS 84 75 76   ALT 8* 8* 9*   AST 17 13 15   BILITOT 0.4 0.3 0.3       Dietary Orders:  Diet Orders            Diet Dysphagia Pureed (IDDSI Level 4) [facility]; Nectar Thick: Dysphagia 1 (Pureed) starting at 02/10 1103            Based on the following criteria, this patient qualifies for intravenous to oral conversion:  [x] The patients gastrointestinal tract is functioning (tolerating medications via oral or enteral route for 24 hours and tolerating food or enteral feeds for 24 hours).  [x] The patient is hemodynamically stable for 24 hours (heart rate <100 beats per minute, systolic blood pressure >99 mm Hg, and respiratory rate <20 breaths per minute).  [x] The patient shows clinical improvement (afebrile for at least 24 hours and white blood cell count downtrending or normalized). Additionally, the patient must be non-neutropenic (absolute neutrophil count >500 cells/mm3).  [x] For antimicrobials, the patient has  received IV therapy for at least 24 hours.    Metronidazole 500 mg IV q8h will be changed to 500 mg PO q8h    Pharmacist's Name: DAVIAN LAMBERT  Pharmacist's Extension: [phone]

## 2020-02-10 NOTE — PT/OT/SLP PROGRESS
Physical Therapy Treatment    Patient Name:  Jeni Chacon   MRN:  [id number]    Recommendations:     Discharge Recommendations:  nursing facility, skilled   Discharge Equipment Recommendations: (TBD pending progress)   Barriers to discharge: Inaccessible home and Decreased caregiver support    Assessment:     Jeni Chacon is a 74 y.o. female admitted with a medical diagnosis of Abscess of sigmoid colon.  She presents with the following impairments/functional limitations:  weakness, impaired endurance, impaired self care skills, impaired functional mobilty, gait instability, impaired balance, impaired cognition, decreased coordination, decreased lower extremity function, edema, decreased safety awareness . Patient was awake and alert during her treatment, asking questions on her current physical condition and overall appeared more aware. Attempted to take steps in standing, but patient was unable to lift her feet off the floor.    Rehab Prognosis: Fair; patient would benefit from acute skilled PT services to address these deficits and reach maximum level of function.    Recent Surgery: * No surgery found *      Plan:     During this hospitalization, patient to be seen 3 x/week to address the identified rehab impairments via gait training, therapeutic activities, therapeutic exercises, neuromuscular re-education and progress toward the following goals:    · Plan of Care Expires:  03/08/20    Subjective     Chief Complaint: feel very weak and drowsy.  Patient/Family Comments/goals: to get stronger.  Pain/Comfort:  · Pain Rating 1: 0/10  · Pain Rating Post-Intervention 1: 0/10      Objective:     Communicated with nsg prior to session.  Patient found HOB elevated with telemetry upon PT entry to room.     General Precautions: Standard, aspiration, fall   Orthopedic Precautions:N/A   Braces: N/A     Functional Mobility:  · Bed Mobility:     · Rolling Right: moderate assistance  · Scooting: maximal assistance  · Supine to  Sit: moderate assistance  · Sit to Supine: moderate assistance and maximal assistance  · Transfers:     · Sit to Stand:  maximal assistance with no AD  · Balance: fair in sitting and poor in satnding.      AM-PAC 6 CLICK MOBILITY  Turning over in bed (including adjusting bedclothes, sheets and blankets)?: 2  Sitting down on and standing up from a chair with arms (e.g., wheelchair, bedside commode, etc.): 2  Moving from lying on back to sitting on the side of the bed?: 2  Moving to and from a bed to a chair (including a wheelchair)?: 2  Need to walk in hospital room?: 1  Climbing 3-5 steps with a railing?: 1  Basic Mobility Total Score: 10       Therapeutic Activities and Exercises:   Static sitting balance at EOB x 10 minutes with min to SBA. Static standing balance 2x15 secs with mod to max assistance.  B LE PROM/AAROM x 30 reps on all available planes of motion.    Patient left HOB elevated with all lines intact, call button in reach and bed alarm on..    GOALS:   Multidisciplinary Problems     Physical Therapy Goals        Problem: Physical Therapy Goal    Goal Priority Disciplines Outcome Goal Variances Interventions   Physical Therapy Goal     PT, PT/OT Ongoing, Progressing     Description:  Goals to be met by: 3/8/2020    Patient will increase functional independence with mobility by performin. Supine to sit with MInimal Assistance  2. Sit to supine with MInimal Assistance  3. Sit to stand transfer with Moderate Assistance  4. Gait  x 15 feet with Minimal Assistance using LRAD  5. Lower extremity exercise program x15 reps per handout, with independence                     Time Tracking:     PT Received On: 02/10/20  PT Start Time: 1535     PT Stop Time: 1602  PT Total Time (min): 27 min     Billable Minutes: Therapeutic Activity 15 and Therapeutic Exercise 12    Treatment Type: Treatment  PT/PTA: PTA     PTA Visit Number: Margarita     Thaddeus Velásquez, PTA  02/10/2020

## 2020-02-10 NOTE — ASSESSMENT & PLAN NOTE
- Patient with evidence of MSSA bacteremia on blood cultures taken on 1/20. First vanc trough drawn on 1/23  - Sensitivities show that staph aureus is sensitive to cefazolin and oxacillin. However patient is still on vanc with documented allergy to penicillins. However patient is unclear as to what reaction she gets and daughter is unclear as well  - In addition, it appears that patient would have received full course of antibiotic for bacteremia as no further blood cultures were positive for MSSA after 1/20 cultures  - Blood cultures repeated  - Evaluated by ID appreciate recs, continue Vancomycin  Off note  shunt was tapped by NSGREG in OSH on 1/25 with negative studies  Discussed with NSGY, as patient does not have meningitis,  shunt is not the source of MSSA and if menigitis suspected to first evaluate with LP.

## 2020-02-10 NOTE — PLAN OF CARE
Pt remained cooperative throughout evening.  NG tube not in place due to difficulty placing in AM.      Pt sating in 90s on 3 L NC, at times pt removing oxygen.  Pt c/o shortness of breath, PRN breathing treatment administered.    PICC line in place.  Cholecystostomy tube in place, 250 mls output.    Purwick in place.  Pt repositioning independently.    Avasys monitor in place, bed alarm on for fall prevention.

## 2020-02-10 NOTE — SUBJECTIVE & OBJECTIVE
Interval History: NAEON. Patient is oriented and feeling better. Continuing abx and planning for d/c to LT[facility].    Review of Systems   Constitutional: Negative for chills and fever.   Cardiovascular: Negative for chest pain.   Gastrointestinal: Negative for abdominal pain, constipation and vomiting.     Objective:     Vital Signs (Most Recent):  Temp: 98.2 °F (36.8 °C) (02/10/20 0813)  Pulse: 101 (02/10/20 0813)  Resp: 20 (02/10/20 0813)  BP: (!) 147/68 (02/10/20 0813)  SpO2: (!) 94 % (02/10/20 0813) Vital Signs (24h Range):  Temp:  [97.2 °F (36.2 °C)-98.4 °F (36.9 °C)] 98.2 °F (36.8 °C)  Pulse:  [] 101  Resp:  [16-22] 20  SpO2:  [88 %-97 %] 94 %  BP: (117-147)/(63-71) 147/68     Weight: 77.6 kg (171 lb 1.2 oz)  Body mass index is 28.47 kg/m².    Intake/Output Summary (Last 24 hours) at 2/10/2020 1137  Last data filed at 2/10/2020 0500  Gross per 24 hour   Intake 400 ml   Output 900 ml   Net -500 ml      Physical Exam   Constitutional: She appears well-developed and well-nourished.   HENT:   Head: Normocephalic and atraumatic.   Oral mucosa, lips - Dry   Eyes: Pupils are equal, round, and reactive to light. EOM are normal.   Neck: Normal range of motion. No tracheal deviation present.   Cardiovascular: Normal heart sounds.   No murmur heard.  Pulmonary/Chest: She has no wheezes. She has rales.   On NC   Abdominal: Soft. Bowel sounds are normal. There is no tenderness.   Percutaneous drain   Musculoskeletal: Normal range of motion. She exhibits no edema.   Neurological: She is alert.       Significant Labs: All pertinent labs within the past 24 hours have been reviewed.    Significant Imaging: I have reviewed all pertinent imaging results/findings within the past 24 hours.

## 2020-02-10 NOTE — PROGRESS NOTES
[facility]  Infectious Disease  Progress Note    Patient Name: Jeni Chacon  MRN: [id number]  Admission Date: 2/7/2020  Length of Stay: 2 days  Attending Physician: Tiara Killian MD  Primary Care Provider: Primary Doctor No    Isolation Status: No active isolations  Assessment/Plan:      * Abscess of sigmoid colon  - ? Cause - infected diverticulum postulated as cause  - ? Could  shunt distal catheter could be in area draining causing fluid collection - rec discuss with radiology to see if is the case  - appears improved on most recent CT - IR does not rec intervention  - consider GI consult as may need consideration for colonoscopy  - no GI complaints  - on Vanc, Cipro and Flagyl     MSSA bacteremia  - 1/20/20 3/3 bottles with MSSA  - BCXs 1/22 NG  - ? Source  - concern as patient has  shunt   - rec NSGY consult and reports OSH tapped shunt and cultures negative - reviewed in care everywhere  -stable non septic  - continue vanc for now for MSSA and enterococcal coverage for abd fluid collection        Anticipated Disposition: tbd    Thank you for your consult. I will follow-up with patient. Please contact us if you have any additional questions.    ROSANNE Bailey  Infectious Disease  [facility]    Subjective:     Principal Problem:Abscess of sigmoid colon    HPI: H&P taken from chart as patient unable to give reliable history:    Patient is a 74 year old female with medical history significant for hypothyroidism, HTN, DMII with neuropathy, tobacco abuse (with no diagnosis of COPD), and history of CVA s/p  shunt who initially presented to [place] with complaints of SOB and abdominal pain. Although patient is oriented to person, time, and place, she still is having some odd behavior. So I gathered her history from her, her medical chart, and from calling her daughter. She reports that late January she had been having abdominal pain with associated SOB for a  few weeks; despite a heavy smoking history, family reports that she has never had SOB or a diagnosis of COPD. She reports no alleviating or aggravating factors, just that the pain was sharp and diffuse.   She was admitted on 1/19 and had a complicated hospital course. Briefly, patient underwent CT abdomen on 1/20 which showed evidence of cholecystitis as well as an sigmoid colon abscess. She had a percutaneous cholecystostomy tube placed on 1/24. The abscess was not drained as IR felt it was unsafe to do so. During her hospital course, her blood cultures were positive for MSSA and she had evidence of UTI. Her antibiotics were changed a few times, but she currently is on cipro and flagyl for intraabdominal infection prophylaxis and on vanc and rifampin for MSSA bacteremia. KESHIA showed inferobasal hypokinesis and EF of 55%; no vegetation noted.  In addition to this she had O2 saturation in the mid-80s. Initially she was placed on bipap and treated for COPD exacerbation; she completed a course of steroids and got one dose of Lasix. However her respiratory status worsening and she was placed in MICU and required intubation from 1/20-1/31. She was Bipap dependent following extubation until 2/6 and is now on nasal cannula; of note, she did require pressors during MICU stay. On top of all this, patient also developed paroxysmal a fib. CHADSVASC elevated however she could not be anticoagulated due to gross hematuria requiring blood transfusion. Patient transferred for second IR opinion regarding drainage of sigmoid abscess and out of confusion and frustration from the multiple consultants at [facility] [place].     Patient admitted and ID consulted for MSSA bacteremia and abdominal abscess.  Patient on Vanc/Cipro/Flagyl.  She has been afebrile and WBC WNL. BCXS NGTD.  She says her  shunt was put in many years ago.  The patient denies any recent fever, chills, ABD, night sweats or sweats.  PICC in place.     CT ABD/Pelvis  repeated:  - Interval decrease in of the small pelvic collection adjacent to the sigmoid colon.  This measures 3.2 x 3.0 x 2.4 cm today (previously 3.5 x 5.0 x 5.3 cm). -Given small size and interposed bowel and bladder, percutaneous drainage is not recommended at this time.  There is persistent mild sigmoid wall thickening and mesenteric stranding.  - The gallbladder is decompressed with a small bore catheter within the gallbladder.  Small amount of pericholecystic fluid.  - Mild hydronephrosis and hydroureter on the right with distal urothelial enhancement.  Degree of hydronephrosis may be slightly improved from prior..  While findings may be reactive due to the previously described pelvic processes, a urothelial neoplasm can not be definitively excluded.  Cystoscopy may be indicated upon resolution of the patient's acute presentation..  - Similar size of the thin walled cystic structure posterior to the stomach which now contains a small amount of gas.  It is uncertain whether this represents a gastric diverticulum  - Bibasilar atelectasis with partially evaluated left lower lung consolidation which could be secondary to aspiration or pneumonia.    IR defers aspiration.     Interval History: No AEON. AFebrile and WBC WNL.  DOing ok.  The patient denies any recent fever, chills, or sweats.      Review of Systems   Constitutional: Negative for activity change, chills, diaphoresis and fever.   Respiratory: Negative for cough, shortness of breath and wheezing.    Cardiovascular: Negative for chest pain.   Gastrointestinal: Negative for abdominal pain, constipation, diarrhea, nausea and vomiting.   Genitourinary: Negative for dysuria, frequency and urgency.   Neurological: Negative for dizziness.   Hematological: Does not bruise/bleed easily.     Objective:     Vital Signs (Most Recent):  Temp: 97.4 °F (36.3 °C) (02/09/20 1927)  Pulse: 90 (02/09/20 1927)  Resp: 16 (02/09/20 1927)  BP: 137/68 (02/09/20 1927)  SpO2: 97  % (02/09/20 1927) Vital Signs (24h Range):  Temp:  [97.2 °F (36.2 °C)-98.4 °F (36.9 °C)] 97.4 °F (36.3 °C)  Pulse:  [] 90  Resp:  [16-22] 16  SpO2:  [88 %-99 %] 97 %  BP: (126-137)/(58-73) 137/68     Weight: 77.6 kg (171 lb 1.2 oz)  Body mass index is 28.47 kg/m².    Estimated Creatinine Clearance: 52.3 mL/min (based on SCr of 1 mg/dL).    Physical Exam   Constitutional: She appears well-developed and well-nourished. No distress.       HENT:   Head: Normocephalic and atraumatic.   Cardiovascular: Normal rate, regular rhythm and normal heart sounds. Exam reveals no gallop and no friction rub.   No murmur heard.  Pulmonary/Chest: Effort normal and breath sounds normal. No stridor. No respiratory distress. She has no wheezes. She has no rales.   Abdominal: Soft. Bowel sounds are normal. She exhibits no distension and no mass. There is no tenderness. There is no rebound and no guarding.   Musculoskeletal: She exhibits no edema.   Neurological: She is alert.   O to person   Skin: Skin is warm and dry. She is not diaphoretic.       Significant Labs:   Blood Culture:   Recent Labs   Lab 02/07/20  1847   LABBLOO No Growth to date  No Growth to date  No Growth to date  No Growth to date     CBC:   Recent Labs   Lab 02/08/20  0239 02/09/20  0554   WBC 8.42 7.37   HGB 7.5* 8.1*   HCT 24.5* 26.3*   * 439*     CMP:   Recent Labs   Lab 02/08/20  0239 02/09/20  0554    146*   K 3.7 3.3*    108   CO2 27 28   * 198*   BUN 10 7*   CREATININE 1.0 1.0   CALCIUM 8.0* 8.1*   PROT 7.3 7.4   ALBUMIN 2.0* 2.0*   BILITOT 0.4 0.3   ALKPHOS 84 75   AST 17 13   ALT 8* 8*   ANIONGAP 8 10   EGFRNONAA 56.4* 56.4*     Urine Culture: No results for input(s): LABURIN in the last 4320 hours.  Urine Studies:   Recent Labs   Lab 02/08/20  1050   COLORU Yellow   APPEARANCEUA Cloudy*   PHUR 7.0   SPECGRAV 1.005   PROTEINUA Negative   GLUCUA Negative   KETONESU Negative   BILIRUBINUA Negative   OCCULTUA 3+*   NITRITE  Negative   LEUKOCYTESUR 3+*   RBCUA 2   WBCUA 2   BACTERIA Many*   SQUAMEPITHEL 1     Wound Culture: No results for input(s): LABAERO in the last 4320 hours.  All pertinent labs within the past 24 hours have been reviewed.    Significant Imaging: I have reviewed all pertinent imaging results/findings within the past 24 hours.   CT Abdomen Pelvis With Contrast [[id number]] Resulted: 02/08/20 1351   Order Status: Completed Updated: 02/08/20 1354   Narrative:     EXAMINATION:  CT ABDOMEN PELVIS WITH CONTRAST    CLINICAL HISTORY:  to re-evaluate the sigmoid colon abscess;    TECHNIQUE:  Low dose axial images, sagittal and coronal reformations were obtained from the lung bases to the pubic symphysis following the IV administration of 75 mL of Omnipaque 350 and the oral administration of 30 mL of Omnipaque 350.    COMPARISON:  Outside CT 01/20/2023    FINDINGS:  Limited evaluations of the lung bases show atelectatic change.  Questionable consolidation in the left lower lobe dependently.  Visualized heart shows coronary atherosclerosis.    Liver is normal in size without focal mass or biliary ductal dilatation.  No biliary ductal dilatation.  The gallbladder is decompressed and contains gallstones.  There is a small percutaneous drain within the gallbladder.  Small amount of pericholecystic fluid..  There is small amount of pericholecystic fluid identified.    The spleen, adrenal glands, and pancreas show no focal abnormality.    The left kidney is normal in size without solid mass or hydronephrosis.  There is a large right peripelvic renal cyst measuring roughly 7.2 cm with minimal peripheral enhancement and no mural nodularity..  Mild right-sided hydronephrosis and hydroureter with mild distal urothelial enhancement of the distal right ureter.  Urinary bladder shows mild wall thickening at the dome likely reactive due to the adjacent small fluid collection.    The gastrointestinal tract shows no evidence of obstruction.   Mild sigmoid wall thickening and mesenteric stranding.  At the inferior aspect of the sigmoid colon, superior to the bladder dome there is a 3.2 x 2.9 x 2.4 cm collection (previously reported 3.5 x 5.0 x 5.3 cm).  There is also a thin walled gas and fluid collection at the posterior aspect of the stomach measuring roughly 5.7 by 3.4 by 3.5 cm.  There is an enteric tube is within the distal stomach.  Intra-abdominal portion of a suspected ventriculoperitoneal shunt is identified and appears continuous.    The uterus appears grossly unremarkable.    Vascular structures show moderate atherosclerosis.  No acute fracture or destructive lesion.   Impression:       Interval decrease in of the small pelvic collection adjacent to the sigmoid colon.  This measures 3.2 x 3.0 x 2.4 cm today (previously 3.5 x 5.0 x 5.3 cm).  Given small size and interposed bowel and bladder, percutaneous drainage is not recommended at this time.  There is persistent mild sigmoid wall thickening and mesenteric stranding.    The gallbladder is decompressed with a small bore catheter within the gallbladder.  Small amount of pericholecystic fluid.    Mild hydronephrosis and hydroureter on the right with distal urothelial enhancement.  Degree of hydronephrosis may be slightly improved from prior..  While findings may be reactive due to the previously described pelvic processes, a urothelial neoplasm can not be definitively excluded.  Cystoscopy may be indicated upon resolution of the patient's acute presentation..    Similar size of the thin walled cystic structure posterior to the stomach which now contains a small amount of gas.  It is uncertain whether this represents a gastric diverticulum    Bibasilar atelectasis with partially evaluated left lower lung consolidation which could be secondary to aspiration or pneumonia.      Electronically signed by: Carl Alegria MD  Date: 02/08/2020  Time: 13:51   X-Ray Chest 1 View [[id number]] Resulted:  02/08/20 0630   Order Status: Completed Updated: 02/08/20 0632   Narrative:     EXAMINATION:  XR CHEST 1 VIEW    CLINICAL HISTORY:  increased work of breathing;    TECHNIQUE:  Single frontal view of the chest was performed.    COMPARISON:  None.    FINDINGS:  Enteric catheter crosses the diaphragm with distal aspect not seen.  Right-sided PICC line with tip projecting over the SVC.  Mediastinal structures are midline.  Hilar contours are unremarkable.  Cardiac silhouette is magnified by AP technique.  Lung volumes are low but symmetric.  There is patchy increased attenuation within the left lower lung zone, possibly atelectasis or consolidation.  No pneumothorax.  No large pleural effusions.  No free air beneath the diaphragm.  Degenerative changes of the spine and shoulders noted.   Impression:       1. Hypoventilatory lung changes with patchy increased attenuation within the left lower lung zone which could relate to atelectasis or consolidation.  No pneumothorax or large pleural effusion.      Electronically signed by: Panchito Joyner MD  Date: 02/08/2020  Time: 06:30   Imaging History     2020  Date Procedure Name PACS Link Status Accession Number Location   02/08/20 10:43 AM CT Abdomen Pelvis With Contrast  Images Final [id number] [facility]   02/07/20 08:34 PM X-Ray Chest 1 View  Images Final [id number] [facility]

## 2020-02-10 NOTE — ASSESSMENT & PLAN NOTE
- 1/20/20 3/3 bottles with MSSA  - BCXs 1/22 NG  - ? Source  - concern as patient has  shunt   - rec NSGY consult and reports OSH tapped shunt and cultures negative - reviewed in care everywhere  -stable non septic  - continue vanc for now for MSSA and enterococcal coverage for abd fluid collection

## 2020-02-10 NOTE — PLAN OF CARE
Problem: SLP Goal  Goal: SLP Goal  Description  Speech Language Pathology Goals  Goals expected to be met by 2/15/20    1.  Pt will participate in ongoing swallow assessment to determine readiness to resume po intake and the least restrictive diet.     Outcome: Ongoing, Progressing     Pt confused but participating in ongoing swallow assessment with ST.  Full session note and recs to follow.      NY Hernandez, CCC-SLP  Speech Language Pathologist  [phone]

## 2020-02-10 NOTE — PT/OT/SLP PROGRESS
"Speech Language Pathology Treatment    Patient Name:  Jeni Chacon   MRN:  [id number]  Admitting Diagnosis: Abscess of sigmoid colon    Recommendations:                 General Recommendations:  Dysphagia therapy  Diet recommendations:  Puree, Liquid Diet Level: Nectar Thick   Aspiration Precautions: 1 bite/sip at a time, Assistance with meals and Assistance with thickening liquids, Feed only when awake/alert, HOB to 90 degrees, Meds crushed in puree, Monitor for s/s of aspiration, No straws, Remain upright 30 minutes post meal, Small bites/sips and Strict aspiration precautions . Monitor closely and discontinue PO intake if s/s aspiration are observed.   * To obtain Nectar Thick Liquids, add 1 pack thickener to 6 oz liquid.   General Precautions: Standard, aspiration, fall  Communication strategies:  provide increased time to answer and go to room if call light pushed    Subjective     "What are you waiting for?" (pt asked as ST halted po trials, desiring more water)    Pain/Comfort:  Pain Rating 1: 0/10  Pain Rating Post-Intervention 1: 0/10    Objective:     Has the patient been evaluated by SLP for swallowing?   Yes  Keep patient NPO? No   Current Respiratory Status: nasal cannula      Pt seen this am with no family present.  Ms. Chacon was awake/alert with some confusion evident, though good cooperation in po trials was noted. Some wheezing/shortness of breath was intermittently noted and present prior to any po trials.  Pt denied any difficulty.  Nursing aware and stated O2 sats have been good.  Nasal cannula was in place.  Pt was repositioned and seated upright.  ST presented ice chips x2, tsp water x2, cup sip x1, nectar thick water via cup x5, puree boluses x5 and cracker x1.  Oral phase was c/b inconsistent reduced closure around spoon/cup and increased mastication time for small portion of cracker.  Initiation of pharyngeal phase was mildly delayed and laryngeal elevation appears mildly reduced to palpation. "  Intermittent double swallows were noted.  Pt denied globus sensation.  Overt cough was noted post tsp trial thin liquid and throat clear post cup sip trial thin liquid.  No overt s/s of aspiration were observed post trials of nectar thick liquid or puree trials this date.  Aspiration risk factors include confusion, delayed initiation of pharyngeal swallow and recent h/o lengthy intubation.  Feel pt would benefit from MBSS if team wishes to fully rule out aspiration.  Pt does appear safe for puree textures and nectar thick liquids per bedside results this date.    Education provided to pt regarding ST role, ongoing swallow assessment, aspiration risk, s/s aspiration, po trials, nectar thick liquids, precautions and poc.   Nursing present post session and results/recs were reviewed.  Communicated to team via secure chat post session.  Team plans for cautious initiation of altered textures/precautions and monitoring to determine if MBSS is warranted for further assessment.  ST will continue to follow to assist in pt's care.     Assessment:     Jeni Chacon is a 74 y.o. female with an SLP diagnosis of Dysphagia.      Goals:   Multidisciplinary Problems     SLP Goals        Problem: SLP Goal    Goal Priority Disciplines Outcome   SLP Goal     SLP Ongoing, Progressing   Description:  Speech Language Pathology Goals  Goals expected to be met by 2/15/20    1.  Pt will participate in ongoing swallow assessment to determine readiness to resume po intake and the least restrictive diet.                      Plan:     · Patient to be seen:  4 x/week   · Plan of Care expires:  03/08/20  · Plan of Care reviewed with:  patient   · SLP Follow-Up:  Yes       Discharge recommendations:  nursing facility, skilled     Time Tracking:     SLP Treatment Date:   02/10/20  Speech Start Time:  0900  Speech Stop Time:  0923     Speech Total Time (min):  23 min    Billable Minutes: Treatment Swallowing Dysfunction 15 and Seld Care/Home  Management Training 8    NY Hernandez, CCC-SLP  Speech Language Pathologist  [phone]  2/10/2020

## 2020-02-10 NOTE — ASSESSMENT & PLAN NOTE
- ? Cause - infected diverticulum postulated as cause  - ? Could  shunt distal catheter could be in area draining causing fluid collection - rec discuss with radiology to see if is the case - per dw IR shunt in CARRI quadrant not near abscess  - appears improved on most recent CT - IR does not rec intervention  - fu asap with GI as oupt as needs consideration for colonoscopy to check for underlying pathology as cause of abscess  - no GI complaints  - on Vanc, Cipro and Flagyl - rec continue for 2 weeks and re-image - if resolved, stop abx  - going to SNF per primary team but if goes to LTAC, rec consult ID to follow.  - FU in 2 weeks in ID clinic with CT abd with contrast  - weekly vanc trough, CBC and CMP while on IV abx - vanc trough goal 15-20 - please have pharmacy manage vanc at SNF

## 2020-02-10 NOTE — PLAN OF CARE
02/10/20 1212   Post-Acute Status   Post-Acute Authorization Placement   Post-Acute Placement Status Referrals Sent     Pt and IM team want Pt to d/c to [facility] to follow with abx and ID team, Sw placed referral and will follow. Liaison Josey reviewing information, Sw following.

## 2020-02-10 NOTE — PLAN OF CARE
Problem: Physical Therapy Goal  Goal: Physical Therapy Goal  Description  Goals to be met by: 3/8/2020    Patient will increase functional independence with mobility by performin. Supine to sit with MInimal Assistance  2. Sit to supine with MInimal Assistance  3. Sit to stand transfer with Moderate Assistance  4. Gait  x 15 feet with Minimal Assistance using LRAD  5. Lower extremity exercise program x15 reps per handout, with independence    Outcome: Ongoing, Progressing.   Generalized weakness and altered mental status continues to limit functional mobility.

## 2020-02-10 NOTE — ASSESSMENT & PLAN NOTE
- Patient with evidence of  an abscess of 3.5 x 5 x 5.3 cm insinuated between the sigmoid colon and the dome of the urinary bladder, possibly in the setting of sigmoid diverticulitis  - Not drained by IR at the time as it was deemed unsafe to do so  - Family wanted transfer for IR second opinion.   - Evaluated by IR, decreased size of the sigmoid collection, intervention deferred for now  - Blood cultures x2 collected, per outside hospital records, blood cultures have been clear since 1/20 ( See MSSA bacteremia)  - Continue cipro flagyl for now, lactate wnl  -4 weeks IV ABX per ID, to be finished at LTAC

## 2020-02-10 NOTE — SUBJECTIVE & OBJECTIVE
Interval History: No AEON. AFebrile and WBC WNL.  DOing ok - no complaints.  The patient denies any recent fever, chills, or sweats.      Review of Systems   Constitutional: Negative for activity change, chills, diaphoresis and fever.   Respiratory: Negative for cough, shortness of breath and wheezing.    Cardiovascular: Negative for chest pain.   Gastrointestinal: Negative for abdominal pain, constipation, diarrhea, nausea and vomiting.   Genitourinary: Negative for dysuria, frequency and urgency.   Neurological: Negative for dizziness.   Hematological: Does not bruise/bleed easily.     Objective:     Vital Signs (Most Recent):  Temp: 98.2 °F (36.8 °C) (02/10/20 0813)  Pulse: 101 (02/10/20 0813)  Resp: 20 (02/10/20 0813)  BP: (!) 147/68 (02/10/20 0813)  SpO2: (!) 94 % (02/10/20 0813) Vital Signs (24h Range):  Temp:  [97.2 °F (36.2 °C)-98.4 °F (36.9 °C)] 98.2 °F (36.8 °C)  Pulse:  [] 101  Resp:  [16-22] 20  SpO2:  [88 %-97 %] 94 %  BP: (117-147)/(63-71) 147/68     Weight: 77.6 kg (171 lb 1.2 oz)  Body mass index is 28.47 kg/m².    Estimated Creatinine Clearance: 52.3 mL/min (based on SCr of 1 mg/dL).    Physical Exam   Constitutional: She appears well-developed and well-nourished. No distress.       HENT:   Head: Normocephalic and atraumatic.   Cardiovascular: Normal rate, regular rhythm and normal heart sounds. Exam reveals no gallop and no friction rub.   No murmur heard.  Pulmonary/Chest: Effort normal and breath sounds normal. No stridor. No respiratory distress. She has no wheezes. She has no rales.   Abdominal: Soft. Bowel sounds are normal. She exhibits no distension and no mass. There is no tenderness. There is no rebound and no guarding.   Musculoskeletal: She exhibits no edema.   Neurological: She is alert.   O to person   Skin: Skin is warm and dry. She is not diaphoretic.       Significant Labs:   Blood Culture:   Recent Labs   Lab 02/07/20  1847   LABBLOO No Growth to date  No Growth to date  No  Growth to date  No Growth to date  No Growth to date  No Growth to date     CBC:   Recent Labs   Lab 02/09/20  0554 02/10/20  0400   WBC 7.37 6.03   HGB 8.1* 7.5*   HCT 26.3* 25.0*   * 478*     CMP:   Recent Labs   Lab 02/09/20  0554 02/10/20  0400   * 144   K 3.3* 3.4*    109   CO2 28 26   * 170*   BUN 7* 6*   CREATININE 1.0 1.0   CALCIUM 8.1* 7.8*   PROT 7.4 7.2   ALBUMIN 2.0* 2.0*   BILITOT 0.3 0.3   ALKPHOS 75 76   AST 13 15   ALT 8* 9*   ANIONGAP 10 9   EGFRNONAA 56.4* 56.4*     Urine Culture: No results for input(s): LABURIN in the last 4320 hours.  Urine Studies:   Recent Labs   Lab 02/08/20  1050   COLORU Yellow   APPEARANCEUA Cloudy*   PHUR 7.0   SPECGRAV 1.005   PROTEINUA Negative   GLUCUA Negative   KETONESU Negative   BILIRUBINUA Negative   OCCULTUA 3+*   NITRITE Negative   LEUKOCYTESUR 3+*   RBCUA 2   WBCUA 2   BACTERIA Many*   SQUAMEPITHEL 1     Wound Culture: No results for input(s): LABAERO in the last 4320 hours.  All pertinent labs within the past 24 hours have been reviewed.    Significant Imaging: I have reviewed all pertinent imaging results/findings within the past 24 hours.   CT Abdomen Pelvis With Contrast [[id number]] Resulted: 02/08/20 1351   Order Status: Completed Updated: 02/08/20 1354   Narrative:     EXAMINATION:  CT ABDOMEN PELVIS WITH CONTRAST    CLINICAL HISTORY:  to re-evaluate the sigmoid colon abscess;    TECHNIQUE:  Low dose axial images, sagittal and coronal reformations were obtained from the lung bases to the pubic symphysis following the IV administration of 75 mL of Omnipaque 350 and the oral administration of 30 mL of Omnipaque 350.    COMPARISON:  Outside CT 01/20/2023    FINDINGS:  Limited evaluations of the lung bases show atelectatic change.  Questionable consolidation in the left lower lobe dependently.  Visualized heart shows coronary atherosclerosis.    Liver is normal in size without focal mass or biliary ductal dilatation.  No biliary  ductal dilatation.  The gallbladder is decompressed and contains gallstones.  There is a small percutaneous drain within the gallbladder.  Small amount of pericholecystic fluid..  There is small amount of pericholecystic fluid identified.    The spleen, adrenal glands, and pancreas show no focal abnormality.    The left kidney is normal in size without solid mass or hydronephrosis.  There is a large right peripelvic renal cyst measuring roughly 7.2 cm with minimal peripheral enhancement and no mural nodularity..  Mild right-sided hydronephrosis and hydroureter with mild distal urothelial enhancement of the distal right ureter.  Urinary bladder shows mild wall thickening at the dome likely reactive due to the adjacent small fluid collection.    The gastrointestinal tract shows no evidence of obstruction.  Mild sigmoid wall thickening and mesenteric stranding.  At the inferior aspect of the sigmoid colon, superior to the bladder dome there is a 3.2 x 2.9 x 2.4 cm collection (previously reported 3.5 x 5.0 x 5.3 cm).  There is also a thin walled gas and fluid collection at the posterior aspect of the stomach measuring roughly 5.7 by 3.4 by 3.5 cm.  There is an enteric tube is within the distal stomach.  Intra-abdominal portion of a suspected ventriculoperitoneal shunt is identified and appears continuous.    The uterus appears grossly unremarkable.    Vascular structures show moderate atherosclerosis.  No acute fracture or destructive lesion.   Impression:       Interval decrease in of the small pelvic collection adjacent to the sigmoid colon.  This measures 3.2 x 3.0 x 2.4 cm today (previously 3.5 x 5.0 x 5.3 cm).  Given small size and interposed bowel and bladder, percutaneous drainage is not recommended at this time.  There is persistent mild sigmoid wall thickening and mesenteric stranding.    The gallbladder is decompressed with a small bore catheter within the gallbladder.  Small amount of pericholecystic  fluid.    Mild hydronephrosis and hydroureter on the right with distal urothelial enhancement.  Degree of hydronephrosis may be slightly improved from prior..  While findings may be reactive due to the previously described pelvic processes, a urothelial neoplasm can not be definitively excluded.  Cystoscopy may be indicated upon resolution of the patient's acute presentation..    Similar size of the thin walled cystic structure posterior to the stomach which now contains a small amount of gas.  It is uncertain whether this represents a gastric diverticulum    Bibasilar atelectasis with partially evaluated left lower lung consolidation which could be secondary to aspiration or pneumonia.      Electronically signed by: Carl Alegria MD  Date: 02/08/2020  Time: 13:51   X-Ray Chest 1 View [[id number]] Resulted: 02/08/20 0630   Order Status: Completed Updated: 02/08/20 0632   Narrative:     EXAMINATION:  XR CHEST 1 VIEW    CLINICAL HISTORY:  increased work of breathing;    TECHNIQUE:  Single frontal view of the chest was performed.    COMPARISON:  None.    FINDINGS:  Enteric catheter crosses the diaphragm with distal aspect not seen.  Right-sided PICC line with tip projecting over the SVC.  Mediastinal structures are midline.  Hilar contours are unremarkable.  Cardiac silhouette is magnified by AP technique.  Lung volumes are low but symmetric.  There is patchy increased attenuation within the left lower lung zone, possibly atelectasis or consolidation.  No pneumothorax.  No large pleural effusions.  No free air beneath the diaphragm.  Degenerative changes of the spine and shoulders noted.   Impression:       1. Hypoventilatory lung changes with patchy increased attenuation within the left lower lung zone which could relate to atelectasis or consolidation.  No pneumothorax or large pleural effusion.      Electronically signed by: Panchito Joyner MD  Date: 02/08/2020  Time: 06:30   Imaging History     2020  Date  Procedure Name PACS Link Status Accession Number Location   02/08/20 10:43 AM CT Abdomen Pelvis With Contrast  Images Final [id number] [facility]   02/07/20 08:34 PM X-Ray Chest 1 View  Images Final [id number] [facility]

## 2020-02-10 NOTE — PROGRESS NOTES
Pharmacist Intervention IV to PO Note    Jeni Chacon is a 74 y.o. female being treated with IV medication ciprofloxacin    Patient Data:    Vital Signs (Most Recent):  Temp: 98.2 °F (36.8 °C) (02/10/20 0813)  Pulse: 101 (02/10/20 0813)  Resp: 20 (02/10/20 0813)  BP: (!) 147/68 (02/10/20 0813)  SpO2: (!) 94 % (02/10/20 0813)   Vital Signs (72h Range):  Temp:  [96.2 °F (35.7 °C)-100.1 °F (37.8 °C)]   Pulse:  []   Resp:  [16-22]   BP: (105-175)/(58-92)   SpO2:  [88 %-99 %]      CBC:  Recent Labs   Lab 02/08/20 0239 02/09/20  0554 02/10/20  0400   WBC 8.42 7.37 6.03   RBC 2.77* 2.91* 2.78*   HGB 7.5* 8.1* 7.5*   HCT 24.5* 26.3* 25.0*   * 439* 478*   MCV 88 90 90   MCH 27.1 27.8 27.0   MCHC 30.6* 30.8* 30.0*     CMP:     Recent Labs   Lab 02/08/20 0239 02/09/20  0554 02/10/20  0400   * 198* 170*   CALCIUM 8.0* 8.1* 7.8*   ALBUMIN 2.0* 2.0* 2.0*   PROT 7.3 7.4 7.2    146* 144   K 3.7 3.3* 3.4*   CO2 27 28 26    108 109   BUN 10 7* 6*   CREATININE 1.0 1.0 1.0   ALKPHOS 84 75 76   ALT 8* 8* 9*   AST 17 13 15   BILITOT 0.4 0.3 0.3       Dietary Orders:  Diet Orders            Diet Dysphagia Pureed (IDDSI Level 4) [facility]; Nectar Thick: Dysphagia 1 (Pureed) starting at 02/10 1103            Based on the following criteria, this patient qualifies for intravenous to oral conversion:  [x] The patients gastrointestinal tract is functioning (tolerating medications via oral or enteral route for 24 hours and tolerating food or enteral feeds for 24 hours).  [x] The patient is hemodynamically stable for 24 hours (heart rate <100 beats per minute, systolic blood pressure >99 mm Hg, and respiratory rate <20 breaths per minute).  [x] The patient shows clinical improvement (afebrile for at least 24 hours and white blood cell count downtrending or normalized). Additionally, the patient must be non-neutropenic (absolute neutrophil count >500 cells/mm3).  [x] For antimicrobials, the patient has  received IV therapy for at least 24 hours.    Ciprofloxacin 400 mg IV q12h will be changed to 500 mg PO q12h    Pharmacist's Name: DAVIAN LAMBERT  Pharmacist's Extension: [phone]

## 2020-02-11 LAB
ALBUMIN SERPL BCP-MCNC: 2.1 G/DL (ref 3.5–5.2)
ALP SERPL-CCNC: 71 U/L (ref 55–135)
ALT SERPL W/O P-5'-P-CCNC: 10 U/L (ref 10–44)
ANION GAP SERPL CALC-SCNC: 7 MMOL/L (ref 8–16)
AST SERPL-CCNC: 14 U/L (ref 10–40)
BASOPHILS # BLD AUTO: 0.02 K/UL (ref 0–0.2)
BASOPHILS NFR BLD: 0.3 % (ref 0–1.9)
BILIRUB SERPL-MCNC: 0.3 MG/DL (ref 0.1–1)
BUN SERPL-MCNC: 6 MG/DL (ref 8–23)
CALCIUM SERPL-MCNC: 8.1 MG/DL (ref 8.7–10.5)
CHLORIDE SERPL-SCNC: 110 MMOL/L (ref 95–110)
CO2 SERPL-SCNC: 25 MMOL/L (ref 23–29)
CREAT SERPL-MCNC: 0.9 MG/DL (ref 0.5–1.4)
DIFFERENTIAL METHOD: ABNORMAL
EOSINOPHIL # BLD AUTO: 0.3 K/UL (ref 0–0.5)
EOSINOPHIL NFR BLD: 4.1 % (ref 0–8)
ERYTHROCYTE [DISTWIDTH] IN BLOOD BY AUTOMATED COUNT: 20.1 % (ref 11.5–14.5)
EST. GFR  (AFRICAN AMERICAN): >60 ML/MIN/1.73 M^2
EST. GFR  (NON AFRICAN AMERICAN): >60 ML/MIN/1.73 M^2
GLUCOSE SERPL-MCNC: 160 MG/DL (ref 70–110)
HCT VFR BLD AUTO: 27.3 % (ref 37–48.5)
HGB BLD-MCNC: 8.4 G/DL (ref 12–16)
IMM GRANULOCYTES # BLD AUTO: 0.03 K/UL (ref 0–0.04)
IMM GRANULOCYTES NFR BLD AUTO: 0.4 % (ref 0–0.5)
LYMPHOCYTES # BLD AUTO: 1.3 K/UL (ref 1–4.8)
LYMPHOCYTES NFR BLD: 18.5 % (ref 18–48)
MAGNESIUM SERPL-MCNC: 1.9 MG/DL (ref 1.6–2.6)
MCH RBC QN AUTO: 27.8 PG (ref 27–31)
MCHC RBC AUTO-ENTMCNC: 30.8 G/DL (ref 32–36)
MCV RBC AUTO: 90 FL (ref 82–98)
MONOCYTES # BLD AUTO: 1.1 K/UL (ref 0.3–1)
MONOCYTES NFR BLD: 15.3 % (ref 4–15)
NEUTROPHILS # BLD AUTO: 4.2 K/UL (ref 1.8–7.7)
NEUTROPHILS NFR BLD: 61.4 % (ref 38–73)
NRBC BLD-RTO: 0 /100 WBC
PHOSPHATE SERPL-MCNC: 2.6 MG/DL (ref 2.7–4.5)
PLATELET # BLD AUTO: 458 K/UL (ref 150–350)
PMV BLD AUTO: 9.6 FL (ref 9.2–12.9)
POCT GLUCOSE: 156 MG/DL (ref 70–110)
POCT GLUCOSE: 177 MG/DL (ref 70–110)
POCT GLUCOSE: 180 MG/DL (ref 70–110)
POCT GLUCOSE: 188 MG/DL (ref 70–110)
POCT GLUCOSE: 201 MG/DL (ref 70–110)
POCT GLUCOSE: 250 MG/DL (ref 70–110)
POTASSIUM SERPL-SCNC: 3.2 MMOL/L (ref 3.5–5.1)
PROT SERPL-MCNC: 7.2 G/DL (ref 6–8.4)
RBC # BLD AUTO: 3.02 M/UL (ref 4–5.4)
SODIUM SERPL-SCNC: 142 MMOL/L (ref 136–145)
VANCOMYCIN TROUGH SERPL-MCNC: 19.4 UG/ML (ref 10–22)
WBC # BLD AUTO: 6.87 K/UL (ref 3.9–12.7)

## 2020-02-11 PROCEDURE — 36415 COLL VENOUS BLD VENIPUNCTURE: CPT

## 2020-02-11 PROCEDURE — 97535 SELF CARE MNGMENT TRAINING: CPT

## 2020-02-11 PROCEDURE — 63600175 PHARM REV CODE 636 W HCPCS: Performed by: STUDENT IN AN ORGANIZED HEALTH CARE EDUCATION/TRAINING PROGRAM

## 2020-02-11 PROCEDURE — 63600175 PHARM REV CODE 636 W HCPCS: Performed by: INTERNAL MEDICINE

## 2020-02-11 PROCEDURE — 85025 COMPLETE CBC W/AUTO DIFF WBC: CPT

## 2020-02-11 PROCEDURE — 25000003 PHARM REV CODE 250: Performed by: STUDENT IN AN ORGANIZED HEALTH CARE EDUCATION/TRAINING PROGRAM

## 2020-02-11 PROCEDURE — 80053 COMPREHEN METABOLIC PANEL: CPT

## 2020-02-11 PROCEDURE — 99232 SBSQ HOSP IP/OBS MODERATE 35: CPT | Mod: ,,, | Performed by: INTERNAL MEDICINE

## 2020-02-11 PROCEDURE — 83735 ASSAY OF MAGNESIUM: CPT

## 2020-02-11 PROCEDURE — 84100 ASSAY OF PHOSPHORUS: CPT

## 2020-02-11 PROCEDURE — 80202 ASSAY OF VANCOMYCIN: CPT

## 2020-02-11 PROCEDURE — 25000003 PHARM REV CODE 250: Performed by: INTERNAL MEDICINE

## 2020-02-11 PROCEDURE — 99232 PR SUBSEQUENT HOSPITAL CARE,LEVL II: ICD-10-PCS | Mod: ,,, | Performed by: INTERNAL MEDICINE

## 2020-02-11 PROCEDURE — 11000001 HC ACUTE MED/SURG PRIVATE ROOM

## 2020-02-11 PROCEDURE — 92526 ORAL FUNCTION THERAPY: CPT

## 2020-02-11 RX ORDER — METOPROLOL SUCCINATE 100 MG/1
100 TABLET, EXTENDED RELEASE ORAL DAILY
Status: DISCONTINUED | OUTPATIENT
Start: 2020-02-12 | End: 2020-02-25 | Stop reason: HOSPADM

## 2020-02-11 RX ORDER — POTASSIUM CHLORIDE 7.45 MG/ML
10 INJECTION INTRAVENOUS
Status: COMPLETED | OUTPATIENT
Start: 2020-02-11 | End: 2020-02-11

## 2020-02-11 RX ORDER — METOPROLOL TARTRATE 25 MG/1
25 TABLET, FILM COATED ORAL ONCE
Status: COMPLETED | OUTPATIENT
Start: 2020-02-11 | End: 2020-02-11

## 2020-02-11 RX ORDER — METOPROLOL TARTRATE 50 MG/1
50 TABLET ORAL ONCE
Status: COMPLETED | OUTPATIENT
Start: 2020-02-11 | End: 2020-02-11

## 2020-02-11 RX ORDER — ENOXAPARIN SODIUM 100 MG/ML
40 INJECTION SUBCUTANEOUS EVERY 24 HOURS
Status: DISCONTINUED | OUTPATIENT
Start: 2020-02-11 | End: 2020-02-13

## 2020-02-11 RX ORDER — POTASSIUM CHLORIDE 20 MEQ/1
40 TABLET, EXTENDED RELEASE ORAL ONCE
Status: DISCONTINUED | OUTPATIENT
Start: 2020-02-11 | End: 2020-02-11

## 2020-02-11 RX ADMIN — VANCOMYCIN HYDROCHLORIDE 1250 MG: 1.25 INJECTION, POWDER, LYOPHILIZED, FOR SOLUTION INTRAVENOUS at 04:02

## 2020-02-11 RX ADMIN — LEVOTHYROXINE SODIUM 200 MCG: 100 TABLET ORAL at 06:02

## 2020-02-11 RX ADMIN — DOCUSATE SODIUM - SENNOSIDES 1 TABLET: 50; 8.6 TABLET, FILM COATED ORAL at 08:02

## 2020-02-11 RX ADMIN — METOPROLOL TARTRATE 25 MG: 25 TABLET ORAL at 08:02

## 2020-02-11 RX ADMIN — CIPROFLOXACIN HYDROCHLORIDE 500 MG: 500 TABLET, FILM COATED ORAL at 12:02

## 2020-02-11 RX ADMIN — INSULIN ASPART 6 UNITS: 100 INJECTION, SOLUTION INTRAVENOUS; SUBCUTANEOUS at 01:02

## 2020-02-11 RX ADMIN — INSULIN ASPART 1 UNITS: 100 INJECTION, SOLUTION INTRAVENOUS; SUBCUTANEOUS at 10:02

## 2020-02-11 RX ADMIN — CIPROFLOXACIN HYDROCHLORIDE 500 MG: 500 TABLET, FILM COATED ORAL at 08:02

## 2020-02-11 RX ADMIN — METRONIDAZOLE 500 MG: 500 TABLET ORAL at 01:02

## 2020-02-11 RX ADMIN — METRONIDAZOLE 500 MG: 500 TABLET ORAL at 10:02

## 2020-02-11 RX ADMIN — METRONIDAZOLE 500 MG: 500 TABLET ORAL at 12:02

## 2020-02-11 RX ADMIN — METRONIDAZOLE 500 MG: 500 TABLET ORAL at 06:02

## 2020-02-11 RX ADMIN — INSULIN ASPART 1 UNITS: 100 INJECTION, SOLUTION INTRAVENOUS; SUBCUTANEOUS at 12:02

## 2020-02-11 RX ADMIN — METOPROLOL TARTRATE 25 MG: 25 TABLET, FILM COATED ORAL at 11:02

## 2020-02-11 RX ADMIN — METOPROLOL TARTRATE 50 MG: 50 TABLET ORAL at 10:02

## 2020-02-11 RX ADMIN — INSULIN ASPART 2 UNITS: 100 INJECTION, SOLUTION INTRAVENOUS; SUBCUTANEOUS at 08:02

## 2020-02-11 RX ADMIN — AMLODIPINE BESYLATE 10 MG: 10 TABLET ORAL at 08:02

## 2020-02-11 RX ADMIN — ENOXAPARIN SODIUM 40 MG: 100 INJECTION SUBCUTANEOUS at 04:02

## 2020-02-11 RX ADMIN — POTASSIUM CHLORIDE 10 MEQ: 7.46 INJECTION, SOLUTION INTRAVENOUS at 01:02

## 2020-02-11 RX ADMIN — POTASSIUM CHLORIDE 10 MEQ: 7.46 INJECTION, SOLUTION INTRAVENOUS at 10:02

## 2020-02-11 RX ADMIN — CIPROFLOXACIN HYDROCHLORIDE 500 MG: 500 TABLET, FILM COATED ORAL at 09:02

## 2020-02-11 RX ADMIN — POLYETHYLENE GLYCOL 3350 17 G: 17 POWDER, FOR SOLUTION ORAL at 08:02

## 2020-02-11 RX ADMIN — METOPROLOL TARTRATE 25 MG: 25 TABLET ORAL at 12:02

## 2020-02-11 RX ADMIN — POTASSIUM CHLORIDE 10 MEQ: 7.46 INJECTION, SOLUTION INTRAVENOUS at 12:02

## 2020-02-11 RX ADMIN — INSULIN ASPART 4 UNITS: 100 INJECTION, SOLUTION INTRAVENOUS; SUBCUTANEOUS at 06:02

## 2020-02-11 RX ADMIN — POTASSIUM CHLORIDE 10 MEQ: 7.46 INJECTION, SOLUTION INTRAVENOUS at 03:02

## 2020-02-11 NOTE — PT/OT/SLP PROGRESS
Occupational Therapy   Treatment    Name: Jeni Chacon  MRN: [id number]  Admitting Diagnosis:  Abscess of sigmoid colon       Recommendations:     Discharge Recommendations: nursing facility, skilled  Discharge Equipment Recommendations:  (Pending progress, as of today W/C and lift device. )  Barriers to discharge:  Other (Comment)(complexity of care)    Assessment:     Jeni Chacon is a 74 y.o. female with a medical diagnosis of Abscess of sigmoid colon.  She presents with improved awareness of self and place (correct name and knew she was in a hospital) overall poor strength and endurance during functional mobility. Performance deficits affecting function are weakness, impaired endurance, impaired self care skills, impaired functional mobilty, impaired balance, decreased lower extremity function, decreased upper extremity function, impaired cardiopulmonary response to activity, decreased safety awareness.     Rehab Prognosis:  Good; patient would benefit from acute skilled OT services to address these deficits and reach maximum level of function.       Plan:     Patient to be seen 3 x/week to address the above listed problems via self-care/home management, therapeutic activities, therapeutic exercises, cognitive retraining, neuromuscular re-education  · Plan of Care Expires: 03/09/20  · Plan of Care Reviewed with: patient    Subjective     Pain/Comfort:  · Pain Rating 1: 0/10  · Pain Rating Post-Intervention 1: 0/10    Objective:     Communicated with: RN prior to session.  Patient found HOB elevated with telemetry upon OT entry to room.    General Precautions: Standard, fall, aspiration, respiratory, dental soft   Orthopedic Precautions:N/A   Braces: N/A     Occupational Performance:     Bed Mobility:    · Patient completed Rolling/Turning to Left with  maximal assistance  · Patient completed Rolling/Turning to Right with maximal assistance  · Patient completed Scooting/Bridging with maximal assistance  · Patient  completed Supine to Sit with maximal assistance  · Patient completed Sit to Supine with maximal assistance     Functional Mobility/Transfers:  · Patient completed Sit <> Stand Transfer with maximal assistance  with  hand-held assist  x2 trials.   · Functional Mobility: Pt was able to stand for < 1 min x2 w/ Max assist for the first attempt and Mod A for the second, but was not able to take side steps without total assistance.     Activities of Daily Living:  · Pt fatigued after standing, she was unable to attempt adls today.      [facility] 6 Click ADL: 16    Treatment & Education:  -Pt edu on OT role/POC  -Importance of OOB activity with staff assistance  -Safety during functional t/f and mobility  - White board updated  - Multiple self care tasks/functional mobility completed-- assistance level noted above  - All questions/concerns answered within OT scope of practice      Patient left HOB elevated with all lines intact, call button in reach, bed alarm on and RN notifiedEducation:      GOALS:   Multidisciplinary Problems     Occupational Therapy Goals        Problem: Occupational Therapy Goal    Goal Priority Disciplines Outcome Interventions   Occupational Therapy Goal     OT, PT/OT Ongoing, Progressing    Description:  Pt will complete GH tasks with stand by assistance  Pt will perform bed mobility with stand by assist  Pt will complete UB dressing with contact guard assist  Pt will follow simple commands 2/3 trials for GH task  Pt will sit edge of bed for >10 minutes for functional task with contact guard assist                    Time Tracking:     OT Date of Treatment: 02/11/20  OT Start Time: 1445  OT Stop Time: 1512  OT Total Time (min): 27 min    Billable Minutes:Self Care/Home Management 27 mins    Reymundo Graves, OT  2/11/2020

## 2020-02-11 NOTE — PT/OT/SLP PROGRESS
"Speech Language Pathology Treatment    Patient Name:  Jeni Chacon   MRN:  [id number]   656/656 A    Admitting Diagnosis: Abscess of sigmoid colon    Recommendations:                 General Recommendations:  Dysphagia therapy  Diet recommendations:  Dental Soft, Liquid Diet Level: Thin   Aspiration Precautions:   · NO straws  · PO meds crushed in pureed  · Fully awake and alert for PO intake  · Fully upright position for PO intake  · Small bites/ sips  · Slow rate of eating/ drinking  · 1 bite/ sip @ a time  · Refrain from talking prior to swallow completion   · Discontinue PO upon: coughing, throat clearing, choking, wet vocal quality, wet breath sounds, watery eyes, reddened facial features  General Precautions: Standard, fall, aspiration, respiratory, dental soft  Communication strategies:  go to room if call light pushed    Subjective     "More water." Pt requested throughout session.     Pain/Comfort:  · Pain Rating 1: 0/10  · Pain Rating Post-Intervention 1: 0/10    Objective:     Has the patient been evaluated by SLP for swallowing?   Yes  Keep patient NPO? No   Current Respiratory Status: nasal cannula      Pt awake and alert upon entry. HOB raised. Pt observed with the following PO trials: ~10oz thin water via multiple cup and straw sips taken in isolation and as regular consistency solid liquid wash and via straw sip x1 taken in isolation and regular consistency solid nasreen cracker bite x1. Prolonged mastication of regular consistency solid appeared likely 2/2 scattered dentition. Although inc'd WOB appreciated when pt seated fully upright for PO trials, did not appear 2/2 dec'd tolerance for PO trials as aside from coughing appreciated with straw sip x1, overt clinical signs aspiration unappreciated across cup sips thin liquid and regular consistency solid. Education provided re: strict adherence to the above listed overt clinical signs aspiration with particular emphasis placed for straw avoidance and PO " meds crushed/ buried in pureed given pt report of coughing/ choking prior to admit when taking PO meds whole with cup sip thin liquid, and ongoing SLP POC. Pt verbalized understanding of all education provided and agreement with SLP POC. White board updated. No further questions.     Results discussed with NSG who verbalized understanding of all information provided.     Assessment:     Jeni Chacon is a 74 y.o. female with an SLP diagnosis of dysphagia.     Goals:   Multidisciplinary Problems     SLP Goals        Problem: SLP Goal    Goal Priority Disciplines Outcome   SLP Goal     SLP Ongoing, Progressing   Description:  Speech Language Pathology   Goals expected to be met by 2/18  1.  Pt will tolerate dental soft solid diet and thin liquids without overt clinical signs aspiration.                      Plan:     · Patient to be seen:  4 x/week   · Plan of Care expires:  03/08/20  · Plan of Care reviewed with:  patient   · SLP Follow-Up:  Yes       Discharge recommendations:  nursing facility, skilled     Time Tracking:     SLP Treatment Date:   02/11/20  Speech Start Time:  1142  Speech Stop Time:  1158     Speech Total Time (min):  16 min    Billable Minutes: Treatment Swallowing Dysfunction 8 and Seld Care/Home Management Training 8    NY Levy, CCC-SLP  [phone]  2/11/2020

## 2020-02-11 NOTE — PLAN OF CARE
Recommend solid diet advancement to dental soft solids and liquid diet advancement to thin liquids. NO straws. PO meds crushed/ buried in pureed.     NY Levy, CCC-[facility]  [phone]  2/11/2020

## 2020-02-11 NOTE — PLAN OF CARE
Problem: Adult Inpatient Plan of Care  Goal: Plan of Care Review  Outcome: Ongoing, Progressing     Problem: Adult Inpatient Plan of Care  Goal: Absence of Hospital-Acquired Illness or Injury  Outcome: Ongoing, Progressing     Problem: Diabetes Comorbidity  Goal: Blood Glucose Level Within Desired Range  Outcome: Ongoing, Progressing

## 2020-02-11 NOTE — PLAN OF CARE
02/11/20 1344   Post-Acute Status   Post-Acute Authorization Placement   Post-Acute Placement Status Referrals Sent     Sw sent out 3 SNF referrals as back up if Pt insurance has no LTAC benefits, Sw to follow.

## 2020-02-12 LAB
ALBUMIN SERPL BCP-MCNC: 2.1 G/DL (ref 3.5–5.2)
ALP SERPL-CCNC: 69 U/L (ref 55–135)
ALT SERPL W/O P-5'-P-CCNC: 9 U/L (ref 10–44)
ANION GAP SERPL CALC-SCNC: 7 MMOL/L (ref 8–16)
AST SERPL-CCNC: 11 U/L (ref 10–40)
BACTERIA BLD CULT: NORMAL
BACTERIA BLD CULT: NORMAL
BASOPHILS # BLD AUTO: 0.02 K/UL (ref 0–0.2)
BASOPHILS NFR BLD: 0.3 % (ref 0–1.9)
BILIRUB SERPL-MCNC: 0.3 MG/DL (ref 0.1–1)
BUN SERPL-MCNC: 5 MG/DL (ref 8–23)
CALCIUM SERPL-MCNC: 8.1 MG/DL (ref 8.7–10.5)
CHLORIDE SERPL-SCNC: 108 MMOL/L (ref 95–110)
CO2 SERPL-SCNC: 24 MMOL/L (ref 23–29)
CREAT SERPL-MCNC: 0.9 MG/DL (ref 0.5–1.4)
DIFFERENTIAL METHOD: ABNORMAL
EOSINOPHIL # BLD AUTO: 0.3 K/UL (ref 0–0.5)
EOSINOPHIL NFR BLD: 4.3 % (ref 0–8)
ERYTHROCYTE [DISTWIDTH] IN BLOOD BY AUTOMATED COUNT: 19.9 % (ref 11.5–14.5)
EST. GFR  (AFRICAN AMERICAN): >60 ML/MIN/1.73 M^2
EST. GFR  (NON AFRICAN AMERICAN): >60 ML/MIN/1.73 M^2
GLUCOSE SERPL-MCNC: 135 MG/DL (ref 70–110)
HCT VFR BLD AUTO: 26.3 % (ref 37–48.5)
HGB BLD-MCNC: 8.2 G/DL (ref 12–16)
IMM GRANULOCYTES # BLD AUTO: 0.03 K/UL (ref 0–0.04)
IMM GRANULOCYTES NFR BLD AUTO: 0.5 % (ref 0–0.5)
LYMPHOCYTES # BLD AUTO: 1.6 K/UL (ref 1–4.8)
LYMPHOCYTES NFR BLD: 24.7 % (ref 18–48)
MAGNESIUM SERPL-MCNC: 1.7 MG/DL (ref 1.6–2.6)
MCH RBC QN AUTO: 27.7 PG (ref 27–31)
MCHC RBC AUTO-ENTMCNC: 31.2 G/DL (ref 32–36)
MCV RBC AUTO: 89 FL (ref 82–98)
MONOCYTES # BLD AUTO: 1 K/UL (ref 0.3–1)
MONOCYTES NFR BLD: 16.3 % (ref 4–15)
NEUTROPHILS # BLD AUTO: 3.4 K/UL (ref 1.8–7.7)
NEUTROPHILS NFR BLD: 53.9 % (ref 38–73)
NRBC BLD-RTO: 0 /100 WBC
PHOSPHATE SERPL-MCNC: 2.8 MG/DL (ref 2.7–4.5)
PLATELET # BLD AUTO: 478 K/UL (ref 150–350)
PMV BLD AUTO: 9.4 FL (ref 9.2–12.9)
POCT GLUCOSE: 160 MG/DL (ref 70–110)
POCT GLUCOSE: 197 MG/DL (ref 70–110)
POCT GLUCOSE: 225 MG/DL (ref 70–110)
POCT GLUCOSE: 263 MG/DL (ref 70–110)
POTASSIUM SERPL-SCNC: 3.6 MMOL/L (ref 3.5–5.1)
PROT SERPL-MCNC: 7.3 G/DL (ref 6–8.4)
RBC # BLD AUTO: 2.96 M/UL (ref 4–5.4)
SODIUM SERPL-SCNC: 139 MMOL/L (ref 136–145)
WBC # BLD AUTO: 6.32 K/UL (ref 3.9–12.7)

## 2020-02-12 PROCEDURE — 99232 SBSQ HOSP IP/OBS MODERATE 35: CPT | Mod: ,,, | Performed by: INTERNAL MEDICINE

## 2020-02-12 PROCEDURE — 63600175 PHARM REV CODE 636 W HCPCS: Performed by: STUDENT IN AN ORGANIZED HEALTH CARE EDUCATION/TRAINING PROGRAM

## 2020-02-12 PROCEDURE — 36415 COLL VENOUS BLD VENIPUNCTURE: CPT

## 2020-02-12 PROCEDURE — 85025 COMPLETE CBC W/AUTO DIFF WBC: CPT

## 2020-02-12 PROCEDURE — 84100 ASSAY OF PHOSPHORUS: CPT

## 2020-02-12 PROCEDURE — 97803 MED NUTRITION INDIV SUBSEQ: CPT

## 2020-02-12 PROCEDURE — 25000003 PHARM REV CODE 250: Performed by: STUDENT IN AN ORGANIZED HEALTH CARE EDUCATION/TRAINING PROGRAM

## 2020-02-12 PROCEDURE — 25000003 PHARM REV CODE 250: Performed by: INTERNAL MEDICINE

## 2020-02-12 PROCEDURE — 80053 COMPREHEN METABOLIC PANEL: CPT

## 2020-02-12 PROCEDURE — 63600175 PHARM REV CODE 636 W HCPCS: Performed by: INTERNAL MEDICINE

## 2020-02-12 PROCEDURE — 83735 ASSAY OF MAGNESIUM: CPT

## 2020-02-12 PROCEDURE — 92526 ORAL FUNCTION THERAPY: CPT

## 2020-02-12 PROCEDURE — 97535 SELF CARE MNGMENT TRAINING: CPT

## 2020-02-12 PROCEDURE — 99232 PR SUBSEQUENT HOSPITAL CARE,LEVL II: ICD-10-PCS | Mod: ,,, | Performed by: INTERNAL MEDICINE

## 2020-02-12 PROCEDURE — 11000001 HC ACUTE MED/SURG PRIVATE ROOM

## 2020-02-12 RX ADMIN — METRONIDAZOLE 500 MG: 500 TABLET ORAL at 11:02

## 2020-02-12 RX ADMIN — METOPROLOL SUCCINATE 100 MG: 100 TABLET, EXTENDED RELEASE ORAL at 09:02

## 2020-02-12 RX ADMIN — LEVOTHYROXINE SODIUM 200 MCG: 100 TABLET ORAL at 05:02

## 2020-02-12 RX ADMIN — ENOXAPARIN SODIUM 40 MG: 100 INJECTION SUBCUTANEOUS at 04:02

## 2020-02-12 RX ADMIN — METRONIDAZOLE 500 MG: 500 TABLET ORAL at 05:02

## 2020-02-12 RX ADMIN — AMLODIPINE BESYLATE 10 MG: 10 TABLET ORAL at 09:02

## 2020-02-12 RX ADMIN — VANCOMYCIN HYDROCHLORIDE 1250 MG: 1.25 INJECTION, POWDER, LYOPHILIZED, FOR SOLUTION INTRAVENOUS at 03:02

## 2020-02-12 RX ADMIN — CIPROFLOXACIN HYDROCHLORIDE 500 MG: 500 TABLET, FILM COATED ORAL at 09:02

## 2020-02-12 RX ADMIN — METRONIDAZOLE 500 MG: 500 TABLET ORAL at 01:02

## 2020-02-12 RX ADMIN — CIPROFLOXACIN HYDROCHLORIDE 500 MG: 500 TABLET, FILM COATED ORAL at 11:02

## 2020-02-12 RX ADMIN — POLYETHYLENE GLYCOL 3350 17 G: 17 POWDER, FOR SOLUTION ORAL at 09:02

## 2020-02-12 RX ADMIN — INSULIN ASPART 4 UNITS: 100 INJECTION, SOLUTION INTRAVENOUS; SUBCUTANEOUS at 05:02

## 2020-02-12 RX ADMIN — DOCUSATE SODIUM - SENNOSIDES 1 TABLET: 50; 8.6 TABLET, FILM COATED ORAL at 09:02

## 2020-02-12 RX ADMIN — INSULIN ASPART 6 UNITS: 100 INJECTION, SOLUTION INTRAVENOUS; SUBCUTANEOUS at 12:02

## 2020-02-12 NOTE — PROGRESS NOTES
"[facility]  Adult Nutrition  Progress Note    SUMMARY       Recommendations    Recommendation:   1. Continue dsyphagia soft diet, ADAT to regular texture per SLP   2. Add boost glucose daily to increase intake   3. RD to monitor and follow up     Goals: Pt to receive nutrition by RD follow-up.   Nutrition Goal Status: goal met  Communication of RD Recs: other (comment)(POC)    Reason for Assessment    Reason For Assessment: RD follow-up  Diagnosis: other (see comments)(abscess of sigmoid colon)  Relevant Medical History: HTN, DM, s/p  shunt  Interdisciplinary Rounds: did not attend  General Information Comments: Pt endorses good PO intake, bfst at bedside states % of meals. PTA good intake per pt, other than 3 weeks PTA, intake declined. Pt agrees to try boost plus to increase intake. No recent wt loss reported, UBW ~120 lbs per pt. No recent wt per chart. NFPE completed pt with no s/s of malnutrition at this time.   Nutrition Discharge Planning: adequate intake via PO diet     Nutrition Risk Screen    Nutrition Risk Screen: no indicators present    Nutrition/Diet History    Spiritual, Cultural Beliefs, Faith Practices, Values that Affect Care: no  Food Allergies: NKFA  Factors Affecting Nutritional Intake: None identified at this time    Anthropometrics    Temp: 98.1 °F (36.7 °C)  Height: 5' 5" (165.1 cm)  Height (inches): 65 in  Weight: 77.6 kg (171 lb 1.2 oz)  Weight (lb): 171.08 lb  Ideal Body Weight (IBW), Female: 125 lb  % Ideal Body Weight, Female (lb): 136.86 %  BMI (Calculated): 28.5  BMI Grade: 25 - 29.9 - overweight       Lab/Procedures/Meds    Pertinent Labs Reviewed: reviewed  Pertinent Labs Comments: BUN 5; Glucose 135; Ca 8.1  Pertinent Medications Reviewed: reviewed    Estimated/Assessed Needs    Weight Used For Calorie Calculations: 77.6 kg (171 lb 1.2 oz)  Energy Calorie Requirements (kcal): 1607  Energy Need Method: Blanchard-St Jeor(1.25 PAL)  Protein Requirements: " 77-93g (1-1.2g/kg)  Weight Used For Protein Calculations: 77.6 kg (171 lb 1.2 oz)  Fluid Requirements (mL): Per MD  RDA Method (mL): 1607    Nutrition Prescription Ordered    Current Diet Order: dsyphagia soft    Evaluation of Received Nutrient/Fluid Intake    I/O: -2 L since admit  Energy Calories Required: meeting needs  Protein Required: meeting needs  Fluid Required: meeting needs  Comments: LBM 2/11  Tolerance: tolerating  % Intake of Estimated Energy Needs: 75 - 100 %  % Meal Intake: 75 - 100 %    Nutrition Risk    Level of Risk/Frequency of Follow-up: high(2X/week)     Assessment and Plan  Nutrition Problem  Inadequate energy intake     Related to (etiology):   Decreased ability to consume adequate energy     Signs and Symptoms (as evidenced by):   NPO status, no form of nutrition at this time     Interventions(treatment strategy):  Collaboration of nutrition care with other providers     Nutrition Diagnosis Status:  Resolved     Monitor and Evaluation    Food and Nutrient Intake: energy intake  Food and Nutrient Adminstration: diet order  Anthropometric Measurements: weight, weight change  Biochemical Data, Medical Tests and Procedures: other (specify)(All labs)  Nutrition-Focused Physical Findings: overall appearance     Malnutrition Assessment  Orbital Region (Subcutaneous Fat Loss): well nourished  Upper Arm Region (Subcutaneous Fat Loss): well nourished   Gnosticist Region (Muscle Loss): well nourished  Clavicle Bone Region (Muscle Loss): well nourished  Clavicle and Acromion Bone Region (Muscle Loss): well nourished  Dorsal Hand (Muscle Loss): well nourished  Patellar Region (Muscle Loss): well nourished  Anterior Thigh Region (Muscle Loss): well nourished     Nutrition Follow-Up    RD Follow-up?: Yes

## 2020-02-12 NOTE — PLAN OF CARE
Pt is A,A,O x 2 . Stable V/S . No C/O pain during the day . Pt slept between care . Pt is more oriented today , pt turned in the bed with minimal assist , incontinence care done as needed , skin remains intact . Pt advanced to dysphagia soft diet with thin liquid per speech therapy . Pt tolerated her diet well no coughing . Drain care done , vanc trough done today . No falls and injuries per shift ,f all precautions maintained .  Problem: Adult Inpatient Plan of Care  Goal: Plan of Care Review  Outcome: Ongoing, Progressing     Problem: Adult Inpatient Plan of Care  Goal: Absence of Hospital-Acquired Illness or Injury  Outcome: Ongoing, Progressing

## 2020-02-12 NOTE — PLAN OF CARE
Problem: SLP Goal  Goal: SLP Goal  Description  Speech Language Pathology   Goals expected to be met by 2/18  1.  Pt will tolerate dental soft solid diet and thin liquids without overt clinical signs aspiration.       Outcome: Ongoing, Progressing     Goals remain appropriate.   Emily P. Abadie M.S., CCC-SLP  Speech Language Pathologist  [phone]  02/12/2020

## 2020-02-12 NOTE — SUBJECTIVE & OBJECTIVE
Interval History: NAEON. Patient medically stable for dc to LTAC.    Review of Systems   Constitutional: Negative for chills and fever.   Cardiovascular: Negative for chest pain.   Gastrointestinal: Negative for abdominal pain, constipation and vomiting.     Objective:     Vital Signs (Most Recent):  Temp: 97 °F (36.1 °C) (02/11/20 1708)  Pulse: 90 (02/11/20 1708)  Resp: 18 (02/11/20 1708)  BP: 119/65 (02/11/20 1708)  SpO2: 95 % (02/11/20 1708) Vital Signs (24h Range):  Temp:  [97 °F (36.1 °C)-98.7 °F (37.1 °C)] 97 °F (36.1 °C)  Pulse:  [87-94] 90  Resp:  [16-20] 18  SpO2:  [87 %-95 %] 95 %  BP: (119-139)/(65-83) 119/65     Weight: 77.6 kg (171 lb 1.2 oz)  Body mass index is 28.47 kg/m².    Intake/Output Summary (Last 24 hours) at 2/11/2020 2037  Last data filed at 2/11/2020 1800  Gross per 24 hour   Intake 670 ml   Output 750 ml   Net -80 ml      Physical Exam   Constitutional: She appears well-developed and well-nourished.   HENT:   Head: Normocephalic and atraumatic.   Oral mucosa, lips - Dry   Eyes: Pupils are equal, round, and reactive to light. EOM are normal.   Neck: Normal range of motion. No tracheal deviation present.   Cardiovascular: Normal heart sounds.   No murmur heard.  Pulmonary/Chest: She has no wheezes. She has rales.   On NC   Abdominal: Soft. Bowel sounds are normal. There is no tenderness.   Percutaneous drain   Musculoskeletal: Normal range of motion. She exhibits no edema.   Neurological: She is alert.       Significant Labs: All pertinent labs within the past 24 hours have been reviewed.    Significant Imaging: I have reviewed all pertinent imaging results/findings within the past 24 hours.

## 2020-02-12 NOTE — PROGRESS NOTES
[facility] Hospital Medicine  Progress Note    Patient Name: Jeni Chacon  MRN: [id number]  Patient Class: IP- Inpatient   Admission Date: 2/7/2020  Length of Stay: 4 days  Attending Physician: Tiara Killian MD  Primary Care Provider: Primary Doctor [facility] Medicine Team: [facility] 2 Chani Mckenzie MD    Subjective:     Principal Problem:Abscess of sigmoid colon        HPI:  Patient is a 74 year old female with medical history significant for hypothyroidism, HTN, DMII with neuropathy, tobacco abuse (with no diagnosis of COPD), and history of CVA s/p  shunt who initially presented to [facility] with complaints of SOB and abdominal pain. Although patient is oriented to person, time, and place, she still is having some odd behavior. So I gathered her history from her, her medical chart, and from calling her daughter. She reports that late January she had been having abdominal pain with associated SOB for a few weeks; despite a heavy smoking history, family reports that she has never had SOB or a diagnosis of COPD. She reports no alleviating or aggravating factors, just that the pain was sharp and diffuse.   She was admitted on 1/19 and had a complicated hospital course. Briefly, patient underwent CT abdomen on 1/20 which showed evidence of cholecystitis as well as an sigmoid colon abscess. She had a percutaneous cholecystostomy tube placed on 1/24. The abscess was not drained as IR felt it was unsafe to do so. During her hospital course, her blood cultures were positive for MSSA and she had evidence of UTI. Her antibiotics were changed a few times, but she currently is on cipro and flagyl for intraabdominal infection prophylaxis and on vanc and rifampin for MSSA bacteremia. KESHIA showed inferobasal hypokinesis and EF of 55%; no vegetation noted.  In addition to this she had O2 saturation in the mid-80s. Initially she was placed on bipap and treated for COPD exacerbation; she  completed a course of steroids and got one dose of Lasix. However her respiratory status worsening and she was placed in MICU and required intubation from 1/20-1/31. She was Bipap dependent following extubation until 2/6 and is now on nasal cannula; of note, she did require pressors during MICU stay. On top of all this, patient also developed paroxysmal a fib. CHADSVASC elevated however she could not be anticoagulated due to gross hematuria requiring blood transfusion. Patient transferred for second IR opinion regarding drainage of sigmoid abscess and out of confusion and frustration from the multiple consultants at [facility].     At the time of my exam, patient was tachycardic, tachypneic, and mildly hypertension. She was AAO x3, however denied all symptoms on review of systems despite appear to have increased work of breathing. She reports that this is her baseline breathing and that she is on 3 L of O2 at home, however she exhibits odd behavior like trying to squirm out of the bed, or being unable to sign blood consent because it does not look right. Of note, patient was transferred with PICC line, however per chart review, it appears that her bacteremia resulted from cultures on 1/20, and the first vanc trough was taken on 1/23. Follow up culture have been negative and patient reports that she got her PICC line more recently. In addition, patient was transferred with NG tube. She reports that she is able to swallow and that the last time she ate was yesterday, however per nursing hand off, patient was to get formal speech eval which was not done due to transfer.      Overview/Hospital Course:  Patient admitted with sigmoid abscess for IR drainage, evaluated by IR and per repeat imaging, size reduced and no intervention. Also came with a percutaneous cholecystostomy tube placed for acute cholecystitis.  She is also evaluated by ID and continuing Vancomycin, Ciprofloxacin and Flagyl. Speech evaluated, to  continue to be NPO but okay for meds and ice chips, to continue to be assessed by them.     Interval History: NAEON. Patient medically stable for dc to LTAC.    Review of Systems   Constitutional: Negative for chills and fever.   Cardiovascular: Negative for chest pain.   Gastrointestinal: Negative for abdominal pain, constipation and vomiting.     Objective:     Vital Signs (Most Recent):  Temp: 97 °F (36.1 °C) (02/11/20 1708)  Pulse: 90 (02/11/20 1708)  Resp: 18 (02/11/20 1708)  BP: 119/65 (02/11/20 1708)  SpO2: 95 % (02/11/20 1708) Vital Signs (24h Range):  Temp:  [97 °F (36.1 °C)-98.7 °F (37.1 °C)] 97 °F (36.1 °C)  Pulse:  [87-94] 90  Resp:  [16-20] 18  SpO2:  [87 %-95 %] 95 %  BP: (119-139)/(65-83) 119/65     Weight: 77.6 kg (171 lb 1.2 oz)  Body mass index is 28.47 kg/m².    Intake/Output Summary (Last 24 hours) at 2/11/2020 2037  Last data filed at 2/11/2020 1800  Gross per 24 hour   Intake 670 ml   Output 750 ml   Net -80 ml      Physical Exam   Constitutional: She appears well-developed and well-nourished.   HENT:   Head: Normocephalic and atraumatic.   Oral mucosa, lips - Dry   Eyes: Pupils are equal, round, and reactive to light. EOM are normal.   Neck: Normal range of motion. No tracheal deviation present.   Cardiovascular: Normal heart sounds.   No murmur heard.  Pulmonary/Chest: She has no wheezes. She has rales.   On NC   Abdominal: Soft. Bowel sounds are normal. There is no tenderness.   Percutaneous drain   Musculoskeletal: Normal range of motion. She exhibits no edema.   Neurological: She is alert.       Significant Labs: All pertinent labs within the past 24 hours have been reviewed.    Significant Imaging: I have reviewed all pertinent imaging results/findings within the past 24 hours.      Assessment/Plan:      * Abscess of sigmoid colon  - Patient with evidence of  an abscess of 3.5 x 5 x 5.3 cm insinuated between the sigmoid colon and the dome of the urinary bladder, possibly in the setting of  sigmoid diverticulitis  - Not drained by IR at the time as it was deemed unsafe to do so  - Family wanted transfer for IR second opinion.   - Evaluated by IR, decreased size of the sigmoid collection, intervention deferred for now  - Blood cultures x2 collected, per outside hospital records, blood cultures have been clear since 1/20 ( See MSSA bacteremia)  - Continue cipro flagyl for now, lactate wnl  -4 weeks IV ABX per ID, to be finished at LTAC      Dysphagia  -diet: pureed w/ nectar thick liquids        Paroxysmal A-fib  - Patient with reports of new onset paroxysmal afib during course of hospital stay  - Appears to be in sinus rhythm at the time of initial exam  - Continue metoprolol. Patient was getting 25 mg QID prior to transfer. Unclear if this was out of caution for hypotension or if increased frequency was needed for continued rate control. Will keep at this dose for now as patient may have received some doses prior to transfer. Consider switching to metoprolol succinate in the morning.   - Patient not anticoagulated at this time due to significant hematuria prior to transfer  * [id number] Score for Atrial Fibrillation Stroke Ris  RESULT SUMMARY:  7 points  Stroke risk was 11.2% per year in >90,000 patients (the [place] Atrial Fibrillation Cohort Study) and 15.7% risk of stroke/TIA/systemic embolism  INPUTS:  Age --> 1 = 65-74  Sex --> 1 = Female  CHF history --> 0 = No  Hypertension history --> 1 = Yes  Stroke/TIA/thromboembolism history --> 2 = Yes  Vascular disease history (prior MI, peripheral artery disease, or aortic plaque) --> 1 = Yes  Diabetes history --> 1 = Yes  - Patient should be anticoagulated pending stable hemoglobin          Hematuria  Blood loss anemia    - Patient with complicated hospital course including significant hematuria leading to blood loss anemia and need for blood transfusion.   - Hg stable at 8.5 for now. Patient has purewick, no signs of gross hematuria. INR 1.1  - type  and screen ordered, blood consent signed  - Will keep cbc at daily frequency for now. If Hg with significant drop in the morning, consider increasing frequency and transfuse for Hg<7  - in terms of anticoagulation (see paroxysmal afib), hold for now. If Hg remains stable, consider starting anticoagulation        COPD (chronic obstructive pulmonary disease)  Respiratory failure with hypoxia  Respiratory distress    - Patient treated for COPD exacerbation with full course of steroids and breathing treatments prior to transfer  - Patient does not have formal diagnosis of COPD but reports heavy smoking history at a pack a day for many years  - Patient also reports using 3 L O2 at home, but daughter is not sure. Unspecified chronicity of hypoxic respiratory failure  - Will continue breathing treatment q6 and incentive spirometry  - Will follow up vbg as patient has apparent increased work of breathing.  - Patient is not on any inhalers at home, she will likely need rescue inhaler and inhaled corticosteroid at minimum at discharge       MSSA bacteremia  - Patient with evidence of MSSA bacteremia on blood cultures taken on 1/20. First vanc trough drawn on 1/23  - Sensitivities show that staph aureus is sensitive to cefazolin and oxacillin. However patient is still on vanc with documented allergy to penicillins. However patient is unclear as to what reaction she gets and daughter is unclear as well  - In addition, it appears that patient would have received full course of antibiotic for bacteremia as no further blood cultures were positive for MSSA after 1/20 cultures  - Blood cultures repeated  - Evaluated by ID yola recs, continue Vancomycin  Off note  shunt was tapped by MANDEEP in OSH on 1/25 with negative studies  Discussed with NSGY, as patient does not have meningitis,  shunt is not the source of MSSA and if menigitis suspected to first evaluate with LP.         Discharge planning  issues  Debility  Overweight    -Arranging d/c to LTAC      Cholecystitis  - Ct scan done on 1/20 showed gallbladder to be markedly distended with sludge and stones, and with an indistinct wall. There was some pericholecystic inflammation in the posterior part. She had a percutaneous cholecystostomy tube placed on 1/24 for the acute cholecystitis  - Drain still in place  - Will continue cipro flagyl for intraabdominal prophylaxis as patient also has sigmoid colon abscess       Type 2 diabetes mellitus, without long-term current use of insulin  - HA1c 7.3 last October   - Repeat ordered  - Patient was on 6 units of insulin basal plus sliding scale prior to transfer, she does not use insulin at home  - Will keep sliding scale insulin for now. Calculate daily insulin needs, place on basal if greater glucose control is needed; no longer being treated with steroids      Hypothyroid  - Continue home levothyroxine.  - 2/7 thyroid labs show TSH elevated at 32, but T4 wnl of 1.05    Essential hypertension  - Patient takes amlodipine and hydrochlorothiazide at home  - metoprolol started for rate control for new onset afib prior to transfer  - Will resume metoprolol and amlodipine as patient is mildly hypertensive  - Resume hydrochlorothiazide as needed        VTE Risk Mitigation (From admission, onward)         Ordered     enoxaparin injection 40 mg  Daily      02/11/20 0747     Place sequential compression device  Until discontinued      02/08/20 0233     IP VTE HIGH RISK PATIENT  Once      02/07/20 1915                      Chani Mckenzie MD  Department of Hospital Medicine   [facility]

## 2020-02-12 NOTE — PROGRESS NOTES
Pharmacokinetic Assessment Follow Up: IV Vancomycin    Vancomycin serum concentration assessment(s):    Vancomycin trough = 19.4 mcg/mL.  Drawn appropriately and can be used to guide therapy    Vancomycin Regimen Plan:  1. Continue vancomycin 1250mg q24 hours  2. Repeat trough @ 1530 on 2/14/20  3. Goal trough = 15-20 mcg/mL    Drug levels (last 3 results):  Recent Labs   Lab Result Units 02/09/20  1202 02/11/20  1524   Vancomycin-Trough ug/mL 20.7 19.4       Pharmacy will continue to follow and monitor vancomycin.    Please contact pharmacy at [facility] [phone] for questions regarding this assessment.    Thank you for the consult,   Wyatt Frank, Preethi.D., [facility]  [phone]       Patient brief summary:  Jeni Chacon is a 74 y.o. female initiated on antimicrobial therapy with IV Vancomycin for treatment of MSSA bacteremia      Drug Allergies:   Review of patient's allergies indicates:   Allergen Reactions    Lisinopril Swelling    Penicillins        Renal Function:   Estimated Creatinine Clearance: 58.2 mL/min (based on SCr of 0.9 mg/dL).,       CBC (last 72 hours):  Recent Labs   Lab Result Units 02/10/20  0400 02/11/20  0354 02/12/20  0255   WBC K/uL 6.03 6.87 6.32   Hemoglobin g/dL 7.5* 8.4* 8.2*   Hematocrit % 25.0* 27.3* 26.3*   Platelets K/uL 478* 458* 478*   Gran% % 59.4 61.4 53.9   Lymph% % 18.9 18.5 24.7   Mono% % 15.4* 15.3* 16.3*   Eosinophil% % 5.5 4.1 4.3   Basophil% % 0.3 0.3 0.3   Differential Method  Automated Automated Automated       Metabolic Panel (last 72 hours):  Recent Labs   Lab Result Units 02/10/20  0400 02/11/20  0354 02/12/20  0255   Sodium mmol/L 144 142 139   Potassium mmol/L 3.4* 3.2* 3.6   Chloride mmol/L 109 110 108   CO2 mmol/L 26 25 24   Glucose mg/dL 170* 160* 135*   BUN, Bld mg/dL 6* 6* 5*   Creatinine mg/dL 1.0 0.9 0.9   Albumin g/dL 2.0* 2.1* 2.1*   Total Bilirubin mg/dL 0.3 0.3 0.3   Alkaline Phosphatase U/L 76 71 69   AST U/L 15 14 11   ALT U/L 9* 10 9*   Magnesium mg/dL 2.2 1.9 1.7    Phosphorus mg/dL 3.2 2.6* 2.8       Vancomycin Administrations:  vancomycin given in the last 96 hours                   vancomycin 750 mg in dextrose 5 % 250 mL IVPB (ready to mix system) (mg) 750 mg New Bag 02/08/20 2200     750 mg New Bag  0849     750 mg New Bag 02/07/20 2202                Microbiologic Results:  Microbiology Results (last 7 days)     Procedure Component Value Units Date/Time    Blood culture (site 1) [[id number]] Collected:  02/07/20 1847    Order Status:  Completed Specimen:  Blood Updated:  02/11/20 2212     Blood Culture, Routine No Growth to date      No Growth to date      No Growth to date      No Growth to date      No Growth to date    Narrative:       Site # 1, aerobic and anaerobic    Blood culture (site 2) [[id number]] Collected:  02/07/20 1847    Order Status:  Completed Specimen:  Blood Updated:  02/11/20 2212     Blood Culture, Routine No Growth to date      No Growth to date      No Growth to date      No Growth to date      No Growth to date    Narrative:       Site # 2, aerobic only    Respiratory Infection Panel, Nasopharyngeal [[id number]] Collected:  02/09/20 2338    Order Status:  Completed Specimen:  Nasopharyngeal Swab Updated:  02/10/20 0122     Respiratory Infection Panel Source NP Swab     Adenovirus Not Detected     Coronavirus 229E Not Detected     Coronavirus HKU1 Not Detected     Coronavirus NL63 Not Detected     Coronavirus OC43 Not Detected     Human Metapneumovirus Not Detected     Human Rhinovirus/Enterovirus Not Detected     Influenza A (subtypes H1, H1-2009,H3) Not Detected     Influenza B Not Detected     Parainfluenza Virus 1 Not Detected     Parainfluenza Virus 2 Not Detected     Parainfluenza Virus 3 Not Detected     Parainfluenza Virus 4 Not Detected     Respiratory Syncytial Virus Not Detected     Bordetella Parapertussis ([id number]) Not Detected     Bordetella pertussis (ptxP) Not Detected     Chlamydia pneumoniae Not Detected     Mycoplasma pneumoniae  Not Detected     Comment: Respiratory Infection Panel testing performed by Multiplex PCR.       Narrative:       For all other respiratory sources order [id number] Respiratory  Viral Panel by PCR (RVPCR)

## 2020-02-12 NOTE — PT/OT/SLP PROGRESS
Speech Language Pathology Treatment    Patient Name:  Jeni Chacon   MRN:  [id number]   [id number] A    Admitting Diagnosis: Abscess of sigmoid colon    Recommendations:                 General Recommendations:  Dysphagia therapy  Diet recommendations:  Dental Soft, Liquid Diet Level: Thin   Aspiration Precautions:   · NO straws  · PO meds crushed in pureed  · Fully awake and alert for PO intake  · Fully upright position for PO intake  · Small bites/ sips  · Slow rate of eating/ drinking  · 1 bite/ sip @ a time  · Refrain from talking prior to swallow completion   General Precautions: Standard, aspiration, fall, dental soft  Communication strategies:  go to room if call light pushed    Subjective     Patient awake and alert upon SLP entry to room. No family members present.     Pain/Comfort:  Pain Rating 1: 0/10    Objective:     Has the patient been evaluated by SLP for swallowing?   Yes  Keep patient NPO? No   Current Respiratory Status: nasal cannula      Patient seen for ongoing swallow assessment/dyspahgia therapy. RN reported patient with coughing appreciated upon medications administered this am crushed in puree. Patient with increased work of breathing noted upon elevation of HOB for trials.  Patient tolerated thin liquids via straw sips over 4oz along with regular solid trials via 1 cracker with no overt signs of airway compromise. Prolonged mastication continued with regular solids therefore patient to remain on dental soft diet at this time. Skilled education was provided to patientre: diet recs, standard aspiration precautions of which to follow, and ongoing ST plan of care. Patient verbalized understanding.     Assessment:     Jeni Chacon is a 74 y.o. female with an SLP diagnosis of dysphagia.     Goals:   Multidisciplinary Problems     SLP Goals        Problem: SLP Goal    Goal Priority Disciplines Outcome   SLP Goal     SLP Ongoing, Progressing   Description:  Speech Language Pathology   Goals expected  to be met by 2/18  1.  Pt will tolerate dental soft solid diet and thin liquids without overt clinical signs aspiration.                      Plan:     · Patient to be seen:  4 x/week   · Plan of Care expires:  03/08/20  · Plan of Care reviewed with:  patient   · SLP Follow-Up:  Yes       Discharge recommendations:  nursing facility, skilled     Time Tracking:     SLP Treatment Date:   02/12/20  Speech Start Time:  1250  Speech Stop Time:  1306     Speech Total Time (min):  16 min    Billable Minutes: Treatment Swallowing Dysfunction 8 and Seld Care/Home Management Training 8    Emily P. Abadie M.S., CCC-SLP  Speech Language Pathologist  [phone]  02/12/2020

## 2020-02-13 PROCEDURE — 97112 NEUROMUSCULAR REEDUCATION: CPT

## 2020-02-13 PROCEDURE — 99232 SBSQ HOSP IP/OBS MODERATE 35: CPT | Mod: ,,, | Performed by: HOSPITALIST

## 2020-02-13 PROCEDURE — 99232 PR SUBSEQUENT HOSPITAL CARE,LEVL II: ICD-10-PCS | Mod: ,,, | Performed by: HOSPITALIST

## 2020-02-13 PROCEDURE — 97535 SELF CARE MNGMENT TRAINING: CPT

## 2020-02-13 PROCEDURE — 97530 THERAPEUTIC ACTIVITIES: CPT

## 2020-02-13 PROCEDURE — 25000003 PHARM REV CODE 250: Performed by: INTERNAL MEDICINE

## 2020-02-13 PROCEDURE — 25000003 PHARM REV CODE 250: Performed by: STUDENT IN AN ORGANIZED HEALTH CARE EDUCATION/TRAINING PROGRAM

## 2020-02-13 PROCEDURE — 11000001 HC ACUTE MED/SURG PRIVATE ROOM

## 2020-02-13 PROCEDURE — 63600175 PHARM REV CODE 636 W HCPCS: Performed by: INTERNAL MEDICINE

## 2020-02-13 RX ORDER — CIPROFLOXACIN 500 MG/1
500 TABLET ORAL EVERY 12 HOURS
Qty: 12 TABLET | Refills: 0
Start: 2020-02-13 | End: 2020-02-21

## 2020-02-13 RX ORDER — METRONIDAZOLE 500 MG/1
500 TABLET ORAL EVERY 8 HOURS
Start: 2020-02-13 | End: 2020-02-21

## 2020-02-13 RX ORDER — METOPROLOL SUCCINATE 100 MG/1
100 TABLET, EXTENDED RELEASE ORAL DAILY
Qty: 30 TABLET | Refills: 2 | Status: SHIPPED | OUTPATIENT
Start: 2020-02-13 | End: 2020-02-24

## 2020-02-13 RX ADMIN — DOCUSATE SODIUM - SENNOSIDES 1 TABLET: 50; 8.6 TABLET, FILM COATED ORAL at 10:02

## 2020-02-13 RX ADMIN — VANCOMYCIN HYDROCHLORIDE 1250 MG: 1.25 INJECTION, POWDER, LYOPHILIZED, FOR SOLUTION INTRAVENOUS at 05:02

## 2020-02-13 RX ADMIN — APIXABAN 5 MG: 5 TABLET, FILM COATED ORAL at 10:02

## 2020-02-13 RX ADMIN — METOPROLOL SUCCINATE 100 MG: 100 TABLET, EXTENDED RELEASE ORAL at 10:02

## 2020-02-13 RX ADMIN — CIPROFLOXACIN HYDROCHLORIDE 500 MG: 500 TABLET, FILM COATED ORAL at 10:02

## 2020-02-13 RX ADMIN — METRONIDAZOLE 500 MG: 500 TABLET ORAL at 05:02

## 2020-02-13 RX ADMIN — AMLODIPINE BESYLATE 10 MG: 10 TABLET ORAL at 10:02

## 2020-02-13 RX ADMIN — METRONIDAZOLE 500 MG: 500 TABLET ORAL at 09:02

## 2020-02-13 RX ADMIN — LEVOTHYROXINE SODIUM 200 MCG: 100 TABLET ORAL at 05:02

## 2020-02-13 RX ADMIN — APIXABAN 5 MG: 5 TABLET, FILM COATED ORAL at 09:02

## 2020-02-13 RX ADMIN — CIPROFLOXACIN HYDROCHLORIDE 500 MG: 500 TABLET, FILM COATED ORAL at 09:02

## 2020-02-13 RX ADMIN — POLYETHYLENE GLYCOL 3350 17 G: 17 POWDER, FOR SOLUTION ORAL at 10:02

## 2020-02-13 NOTE — PROGRESS NOTES
[facility] Hospital Medicine  Progress Note    Patient Name: Jeni Chacon  MRN: [id number]  Patient Class: IP- Inpatient   Admission Date: 2/7/2020  Length of Stay: 5 days  Attending Physician: Tiara Killian MD  Primary Care Provider: [facility] Medicine Team: [facility] HOSP MED 2 Chani Mckenzie MD    Subjective:     Principal Problem:Abscess of sigmoid colon        HPI:  Patient is a 74 year old female with medical history significant for hypothyroidism, HTN, DMII with neuropathy, tobacco abuse (with no diagnosis of COPD), and history of CVA s/p  shunt who initially presented to [place] with complaints of SOB and abdominal pain. Although patient is oriented to person, time, and place, she still is having some odd behavior. So I gathered her history from her, her medical chart, and from calling her daughter. She reports that late January she had been having abdominal pain with associated SOB for a few weeks; despite a heavy smoking history, family reports that she has never had SOB or a diagnosis of COPD. She reports no alleviating or aggravating factors, just that the pain was sharp and diffuse.   She was admitted on 1/19 and had a complicated hospital course. Briefly, patient underwent CT abdomen on 1/20 which showed evidence of cholecystitis as well as an sigmoid colon abscess. She had a percutaneous cholecystostomy tube placed on 1/24. The abscess was not drained as IR felt it was unsafe to do so. During her hospital course, her blood cultures were positive for MSSA and she had evidence of UTI. Her antibiotics were changed a few times, but she currently is on cipro and flagyl for intraabdominal infection prophylaxis and on vanc and rifampin for MSSA bacteremia. KESHIA showed inferobasal hypokinesis and EF of 55%; no vegetation noted.  In addition to this she had O2 saturation in the mid-80s. Initially she was placed on bipap and treated for COPD exacerbation; she  completed a course of steroids and got one dose of Lasix. However her respiratory status worsening and she was placed in MICU and required intubation from 1/20-1/31. She was Bipap dependent following extubation until 2/6 and is now on nasal cannula; of note, she did require pressors during MICU stay. On top of all this, patient also developed paroxysmal a fib. CHADSVASC elevated however she could not be anticoagulated due to gross hematuria requiring blood transfusion. Patient transferred for second IR opinion regarding drainage of sigmoid abscess and out of confusion and frustration from the multiple consultants at [place].     At the time of my exam, patient was tachycardic, tachypneic, and mildly hypertension. She was AAO x3, however denied all symptoms on review of systems despite appear to have increased work of breathing. She reports that this is her baseline breathing and that she is on 3 L of O2 at home, however she exhibits odd behavior like trying to squirm out of the bed, or being unable to sign blood consent because it does not look right. Of note, patient was transferred with PICC line, however per chart review, it appears that her bacteremia resulted from cultures on 1/20, and the first vanc trough was taken on 1/23. Follow up culture have been negative and patient reports that she got her PICC line more recently. In addition, patient was transferred with NG tube. She reports that she is able to swallow and that the last time she ate was yesterday, however per nursing hand off, patient was to get formal speech eval which was not done due to transfer.      Overview/Hospital Course:  Patient admitted with sigmoid abscess for IR drainage, evaluated by IR and per repeat imaging, size reduced and no intervention. Also came with a percutaneous cholecystostomy tube placed for acute cholecystitis.  She is also evaluated by ID and continuing Vancomycin, Ciprofloxacin and Flagyl. Speech evaluated, to  continue to be NPO but okay for meds and ice chips, to continue to be assessed by them.     Interval History: NAEON. Patient continuing IV abx and pending placement    Review of Systems   Constitutional: Negative for chills and fever.   Cardiovascular: Negative for chest pain.   Gastrointestinal: Negative for abdominal pain, constipation and vomiting.     Objective:     Vital Signs (Most Recent):  Temp: 98.2 °F (36.8 °C) (02/12/20 1541)  Pulse: 90 (02/12/20 1541)  Resp: 16 (02/12/20 1541)  BP: (!) 141/88 (02/12/20 1541)  SpO2: (!) 86 % (02/12/20 1541) Vital Signs (24h Range):  Temp:  [97.8 °F (36.6 °C)-98.9 °F (37.2 °C)] 98.2 °F (36.8 °C)  Pulse:  [80-93] 90  Resp:  [16-18] 16  SpO2:  [86 %-100 %] 86 %  BP: (127-155)/(57-88) 141/88     Weight: 77.6 kg (171 lb 1.2 oz)  Body mass index is 28.47 kg/m².  No intake or output data in the 24 hours ending 02/12/20 1835   Physical Exam   Constitutional: She appears well-developed and well-nourished.   HENT:   Head: Normocephalic and atraumatic.   Eyes: Pupils are equal, round, and reactive to light. EOM are normal.   Neck: Normal range of motion. No tracheal deviation present.   Cardiovascular: Normal heart sounds.   No murmur heard.  Pulmonary/Chest: She has no wheezes. She has rales.   Abdominal: Soft. Bowel sounds are normal. There is no tenderness.   Percutaneous drain   Musculoskeletal: Normal range of motion. She exhibits no edema.   Neurological: She is alert.       Significant Labs: All pertinent labs within the past 24 hours have been reviewed.    Significant Imaging: I have reviewed all pertinent imaging results/findings within the past 24 hours.      Assessment/Plan:      * Abscess of sigmoid colon  - Patient with evidence of  an abscess of 3.5 x 5 x 5.3 cm insinuated between the sigmoid colon and the dome of the urinary bladder, possibly in the setting of sigmoid diverticulitis  - Not drained by IR at the time as it was deemed unsafe to do so  - Family wanted  transfer for IR second opinion.   - Evaluated by IR, decreased size of the sigmoid collection, intervention deferred for now  - Blood cultures x2 collected, per outside hospital records, blood cultures have been clear since 1/20 ( See MSSA bacteremia)  - Continue cipro flagyl for now, lactate wnl  -4 weeks IV ABX per ID, to be finished at LTAC      Dysphagia  -diet: pureed w/ nectar thick liquids        Paroxysmal A-fib  - Patient with reports of new onset paroxysmal afib during course of hospital stay  - Appears to be in sinus rhythm at the time of initial exam  - Continue metoprolol. Patient was getting 25 mg QID prior to transfer. Unclear if this was out of caution for hypotension or if increased frequency was needed for continued rate control. Will keep at this dose for now as patient may have received some doses prior to transfer. Consider switching to metoprolol succinate in the morning.   - Patient not anticoagulated at this time due to significant hematuria prior to transfer  * [id number] Score for Atrial Fibrillation Stroke Ris  RESULT SUMMARY:  7 points  Stroke risk was 11.2% per year in >90,000 patients (the [place] Atrial Fibrillation Cohort Study) and 15.7% risk of stroke/TIA/systemic embolism  INPUTS:  Age --> 1 = 65-74  Sex --> 1 = Female  CHF history --> 0 = No  Hypertension history --> 1 = Yes  Stroke/TIA/thromboembolism history --> 2 = Yes  Vascular disease history (prior MI, peripheral artery disease, or aortic plaque) --> 1 = Yes  Diabetes history --> 1 = Yes  - Patient should be anticoagulated pending stable hemoglobin          Hematuria  Blood loss anemia    - Patient with complicated hospital course including significant hematuria leading to blood loss anemia and need for blood transfusion.   - Hg stable at 8.5 for now. Patient has purewick, no signs of gross hematuria. INR 1.1  - type and screen ordered, blood consent signed  - Will keep cbc at daily frequency for now. If Hg with  significant drop in the morning, consider increasing frequency and transfuse for Hg<7  - in terms of anticoagulation (see paroxysmal afib), hold for now. If Hg remains stable, consider starting anticoagulation        COPD (chronic obstructive pulmonary disease)  Respiratory failure with hypoxia  Respiratory distress    - Patient treated for COPD exacerbation with full course of steroids and breathing treatments prior to transfer  - Patient does not have formal diagnosis of COPD but reports heavy smoking history at a pack a day for many years  - Patient also reports using 3 L O2 at home, but daughter is not sure. Unspecified chronicity of hypoxic respiratory failure  - Will continue breathing treatment q6 and incentive spirometry  - Will follow up vbg as patient has apparent increased work of breathing.  - Patient is not on any inhalers at home, she will likely need rescue inhaler and inhaled corticosteroid at minimum at discharge       MSSA bacteremia  - Patient with evidence of MSSA bacteremia on blood cultures taken on 1/20. First vanc trough drawn on 1/23  - Sensitivities show that staph aureus is sensitive to cefazolin and oxacillin. However patient is still on vanc with documented allergy to penicillins. However patient is unclear as to what reaction she gets and daughter is unclear as well  - In addition, it appears that patient would have received full course of antibiotic for bacteremia as no further blood cultures were positive for MSSA after 1/20 cultures  - Blood cultures repeated  - Evaluated by ID appreciate recs, continue Vancomycin  Off note  shunt was tapped by MANDEEP in OSH on 1/25 with negative studies  Discussed with NSGY, as patient does not have meningitis,  shunt is not the source of MSSA and if menigitis suspected to first evaluate with LP.         Discharge planning issues  Debility  Overweight    -Arranging d/c to LTAC      Cholecystitis  - Ct scan done on 1/20 showed gallbladder to be  markedly distended with sludge and stones, and with an indistinct wall. There was some pericholecystic inflammation in the posterior part. She had a percutaneous cholecystostomy tube placed on 1/24 for the acute cholecystitis  - Drain still in place  - Will continue cipro flagyl for intraabdominal prophylaxis as patient also has sigmoid colon abscess       Type 2 diabetes mellitus, without long-term current use of insulin  - HA1c 7.3 last October   - Repeat ordered  - Patient was on 6 units of insulin basal plus sliding scale prior to transfer, she does not use insulin at home  - Will keep sliding scale insulin for now. Calculate daily insulin needs, place on basal if greater glucose control is needed; no longer being treated with steroids      Hypothyroid  - Continue home levothyroxine.  - 2/7 thyroid labs show TSH elevated at 32, but T4 wnl of 1.05    Essential hypertension  - Patient takes amlodipine and hydrochlorothiazide at home  - metoprolol started for rate control for new onset afib prior to transfer  - Will resume metoprolol and amlodipine as patient is mildly hypertensive  - Resume hydrochlorothiazide as needed        VTE Risk Mitigation (From admission, onward)         Ordered     enoxaparin injection 40 mg  Daily      02/11/20 0747     Place sequential compression device  Until discontinued      02/08/20 0233     IP VTE HIGH RISK PATIENT  Once      02/07/20 1915                      Chani Mckenzie MD  Department of Hospital Medicine   [facility]

## 2020-02-13 NOTE — PROGRESS NOTES
[facility] Hospital Medicine  Progress Note    Patient Name: Jeni Chacon  MRN: [id number]  Patient Class: IP- Inpatient   Admission Date: 2/7/2020  Length of Stay: 6 days  Attending Physician: Juan Barbosa MD  Primary Care Provider: [facility] Medicine Team: [facility] HOSP MED 2 Chani Mckenzie MD    Subjective:     Principal Problem:Abscess of sigmoid colon        HPI:  Patient is a 74 year old female with medical history significant for hypothyroidism, HTN, DMII with neuropathy, tobacco abuse (with no diagnosis of COPD), and history of CVA s/p  shunt who initially presented to [place] with complaints of SOB and abdominal pain. Although patient is oriented to person, time, and place, she still is having some odd behavior. So I gathered her history from her, her medical chart, and from calling her daughter. She reports that late January she had been having abdominal pain with associated SOB for a few weeks; despite a heavy smoking history, family reports that she has never had SOB or a diagnosis of COPD. She reports no alleviating or aggravating factors, just that the pain was sharp and diffuse.   She was admitted on 1/19 and had a complicated hospital course. Briefly, patient underwent CT abdomen on 1/20 which showed evidence of cholecystitis as well as an sigmoid colon abscess. She had a percutaneous cholecystostomy tube placed on 1/24. The abscess was not drained as IR felt it was unsafe to do so. During her hospital course, her blood cultures were positive for MSSA and she had evidence of UTI. Her antibiotics were changed a few times, but she currently is on cipro and flagyl for intraabdominal infection prophylaxis and on vanc and rifampin for MSSA bacteremia. KESHIA showed inferobasal hypokinesis and EF of 55%; no vegetation noted.  In addition to this she had O2 saturation in the mid-80s. Initially she was placed on bipap and treated for COPD exacerbation; she  completed a course of steroids and got one dose of Lasix. However her respiratory status worsening and she was placed in MICU and required intubation from 1/20-1/31. She was Bipap dependent following extubation until 2/6 and is now on nasal cannula; of note, she did require pressors during MICU stay. On top of all this, patient also developed paroxysmal a fib. CHADSVASC elevated however she could not be anticoagulated due to gross hematuria requiring blood transfusion. Patient transferred for second IR opinion regarding drainage of sigmoid abscess and out of confusion and frustration from the multiple consultants at [place].     At the time of my exam, patient was tachycardic, tachypneic, and mildly hypertension. She was AAO x3, however denied all symptoms on review of systems despite appear to have increased work of breathing. She reports that this is her baseline breathing and that she is on 3 L of O2 at home, however she exhibits odd behavior like trying to squirm out of the bed, or being unable to sign blood consent because it does not look right. Of note, patient was transferred with PICC line, however per chart review, it appears that her bacteremia resulted from cultures on 1/20, and the first vanc trough was taken on 1/23. Follow up culture have been negative and patient reports that she got her PICC line more recently. In addition, patient was transferred with NG tube. She reports that she is able to swallow and that the last time she ate was yesterday, however per nursing hand off, patient was to get formal speech eval which was not done due to transfer.      Overview/Hospital Course:  Patient admitted with sigmoid abscess for IR drainage, evaluated by IR and per repeat imaging, size reduced and no intervention. Also came with a percutaneous cholecystostomy tube placed for acute cholecystitis.  She is also evaluated by ID and continuing Vancomycin, Ciprofloxacin and Flagyl. Speech evaluated, to  continue to be NPO but okay for meds and ice chips, to continue to be assessed by them.     Interval History: NAEON. Patient continues to improve and is pending placement.  Review of Systems   Constitutional: Negative for chills and fever.   Cardiovascular: Negative for chest pain.   Gastrointestinal: Negative for abdominal pain, constipation and vomiting.     Objective:     Vital Signs (Most Recent):  Temp: 98.7 °F (37.1 °C) (02/13/20 1154)  Pulse: 84 (02/13/20 1154)  Resp: 18 (02/13/20 1154)  BP: (!) 149/75 (02/13/20 1154)  SpO2: 95 % (02/13/20 1154) Vital Signs (24h Range):  Temp:  [98.2 °F (36.8 °C)-99.5 °F (37.5 °C)] 98.7 °F (37.1 °C)  Pulse:  [80-95] 84  Resp:  [16-18] 18  SpO2:  [86 %-96 %] 95 %  BP: (120-149)/(61-91) 149/75     Weight: 77.6 kg (171 lb 1.2 oz)  Body mass index is 28.47 kg/m².    Intake/Output Summary (Last 24 hours) at 2/13/2020 1407  Last data filed at 2/12/2020 1900  Gross per 24 hour   Intake --   Output 400 ml   Net -400 ml      Physical Exam   Constitutional: She appears well-developed and well-nourished.   HENT:   Head: Normocephalic and atraumatic.   Eyes: Pupils are equal, round, and reactive to light. EOM are normal.   Neck: Normal range of motion. No tracheal deviation present.   Cardiovascular: Normal heart sounds.   No murmur heard.  Pulmonary/Chest: Effort normal. She has no wheezes. She has no rales.   Abdominal: Soft. Bowel sounds are normal. There is no tenderness.   Percutaneous drain   Musculoskeletal: Normal range of motion. She exhibits no edema.   Neurological: She is alert.   Skin: Skin is warm and dry.       Significant Labs: All pertinent labs within the past 24 hours have been reviewed.    Significant Imaging: I have reviewed all pertinent imaging results/findings within the past 24 hours.      Assessment/Plan:      * Abscess of sigmoid colon  - Patient with evidence of  an abscess of 3.5 x 5 x 5.3 cm insinuated between the sigmoid colon and the dome of the urinary  bladder, possibly in the setting of sigmoid diverticulitis  - Not drained by IR at the time as it was deemed unsafe to do so  - Family wanted transfer for IR second opinion.   - Evaluated by IR, decreased size of the sigmoid collection, intervention deferred for now  - Blood cultures x2 collected, per outside hospital records, blood cultures have been clear since 1/20 ( See MSSA bacteremia)  - Continue cipro flagyl for now, lactate wnl  -4 weeks IV ABX per ID, to be finished at LTAC      Dysphagia  -diet: pureed w/ nectar thick liquids        Paroxysmal A-fib  - Patient with reports of new onset paroxysmal afib during course of hospital stay  - Appears to be in sinus rhythm at the time of initial exam  - Continue metoprolol. Patient was getting 25 mg QID prior to transfer. Unclear if this was out of caution for hypotension or if increased frequency was needed for continued rate control. Will keep at this dose for now as patient may have received some doses prior to transfer. Consider switching to metoprolol succinate in the morning.   - Patient not anticoagulated at this time due to significant hematuria prior to transfer  * [id number] Score for Atrial Fibrillation Stroke Ris  RESULT SUMMARY:  7 points  Stroke risk was 11.2% per year in >90,000 patients (the [place] Atrial Fibrillation Cohort Study) and 15.7% risk of stroke/TIA/systemic embolism  INPUTS:  Age --> 1 = 65-74  Sex --> 1 = Female  CHF history --> 0 = No  Hypertension history --> 1 = Yes  Stroke/TIA/thromboembolism history --> 2 = Yes  Vascular disease history (prior MI, peripheral artery disease, or aortic plaque) --> 1 = Yes  Diabetes history --> 1 = Yes  -on apixaban 5mg BID          Hematuria  Blood loss anemia    - Patient with complicated hospital course including significant hematuria leading to blood loss anemia and need for blood transfusion.   - Hg stable at 8.5 for now. Patient has purewick, no signs of gross hematuria. INR 1.1  - type and  screen ordered, blood consent signed  - Will keep cbc at daily frequency for now. If Hg with significant drop in the morning, consider increasing frequency and transfuse for Hg<7  - in terms of anticoagulation (see paroxysmal afib), hold for now. If Hg remains stable, consider starting anticoagulation        COPD (chronic obstructive pulmonary disease)  Respiratory failure with hypoxia  Respiratory distress    - Patient treated for COPD exacerbation with full course of steroids and breathing treatments prior to transfer  - Patient does not have formal diagnosis of COPD but reports heavy smoking history at a pack a day for many years  - Patient also reports using 3 L O2 at home, but daughter is not sure. Unspecified chronicity of hypoxic respiratory failure  - Will continue breathing treatment q6 and incentive spirometry  - Will follow up vbg as patient has apparent increased work of breathing.  - Patient is not on any inhalers at home, she will likely need rescue inhaler and inhaled corticosteroid at minimum at discharge       MSSA bacteremia  - Patient with evidence of MSSA bacteremia on blood cultures taken on 1/20. First vanc trough drawn on 1/23  - Sensitivities show that staph aureus is sensitive to cefazolin and oxacillin. However patient is still on vanc with documented allergy to penicillins. However patient is unclear as to what reaction she gets and daughter is unclear as well  - In addition, it appears that patient would have received full course of antibiotic for bacteremia as no further blood cultures were positive for MSSA after 1/20 cultures  - Blood cultures repeated  - Evaluated by ID yola recs, continue Vancomycin  Off note  shunt was tapped by MANDEEP in OSH on 1/25 with negative studies  Discussed with NSGY, as patient does not have meningitis,  shunt is not the source of MSSA and if menigitis suspected to first evaluate with LP.         Discharge planning  issues  Debility  Overweight    -Arranging d/c to LTAC      Cholecystitis  - Ct scan done on 1/20 showed gallbladder to be markedly distended with sludge and stones, and with an indistinct wall. There was some pericholecystic inflammation in the posterior part. She had a percutaneous cholecystostomy tube placed on 1/24 for the acute cholecystitis  - Drain still in place  - Will continue cipro flagyl for intraabdominal prophylaxis as patient also has sigmoid colon abscess       Type 2 diabetes mellitus, without long-term current use of insulin  - HA1c 7.3 last October   - Repeat ordered  - Patient was on 6 units of insulin basal plus sliding scale prior to transfer, she does not use insulin at home  - Will keep sliding scale insulin for now. Calculate daily insulin needs, place on basal if greater glucose control is needed; no longer being treated with steroids      Hypothyroid  - Continue home levothyroxine.  - 2/7 thyroid labs show TSH elevated at 32, but T4 wnl of 1.05    Essential hypertension  - Patient takes amlodipine and hydrochlorothiazide at home  - metoprolol started for rate control for new onset afib prior to transfer  - Will resume metoprolol and amlodipine as patient is mildly hypertensive  - Resume hydrochlorothiazide as needed        VTE Risk Mitigation (From admission, onward)         Ordered     apixaban tablet 5 mg  2 times daily      02/13/20 0927     Place sequential compression device  Until discontinued      02/08/20 0233     IP VTE HIGH RISK PATIENT  Once      02/07/20 1915                      Chani Mckenzie MD  Department of Hospital Medicine   [facility]

## 2020-02-13 NOTE — PT/OT/SLP PROGRESS
"Physical Therapy Treatment    Patient Name:  Jeni Chacon   MRN:  [id number]    Recommendations:     Discharge Recommendations:  nursing facility, skilled   Discharge Equipment Recommendations: other (see comments)(TBD pending progress)   Barriers to discharge: Inaccessible home and Decreased caregiver support    Assessment:     Jeni Chacon is a 74 y.o. female admitted with a medical diagnosis of Abscess of sigmoid colon.  She presents with the following impairments/functional limitations:  weakness, gait instability, decreased upper extremity function, impaired endurance, impaired cardiopulmonary response to activity, impaired balance, decreased lower extremity function, impaired coordination, decreased safety awareness, impaired self care skills, impaired functional mobilty, impaired cognition. Patient tolerated session well. She continues to improve with mentation and functional mobility. She remains highly distractible, but was able to be re-directed. Patient would continue to benefit from skilled PT services while in the hospital. At this time,upon d/c she remains an appropriate candidate for SNF in order to progress towards an improved level of functional mobility independence.    Rehab Prognosis: Good; patient would benefit from acute skilled PT services to address these deficits and reach maximum level of function.    Recent Surgery: * No surgery found *      Plan:     During this hospitalization, patient to be seen 3 x/week to address the identified rehab impairments via gait training, therapeutic activities, therapeutic exercises, neuromuscular re-education and progress toward the following goals:    · Plan of Care Expires:  03/08/20    Subjective     Chief Complaint: none  Patient/Family Comments/goals: "I want food. It's so cold."  Pain/Comfort:  · Pain Rating 1: 0/10  · Pain Rating Post-Intervention 1: 0/10      Objective:     Communicated with RN prior to session.  Patient found supine with telemetry, " peripheral IV(ostomy bag) upon PT entry to room.     General Precautions: Standard, aspiration, fall, dental soft   Orthopedic Precautions:N/A   Braces: N/A     Functional Mobility:  · Bed Mobility:     · Rolling Right: minimum assistance  · Scooting: moderate assistance  · Supine to Sit: moderate assistance  · Sit to Supine: moderate assistance  · Transfers:     · Sit to Stand:  moderate assistance and of 2 persons with rolling walker  · Gait: ~4 side steps closer to HOB with ModA and RW  ·  decreased R foot clearance and shuffle across ground  ·  FFP, mildly unsteady, no LOB  ·  vc's to improve safety and remain standing for proprioceptive awareness through BLE  ·  patient moderately fatigued upon completion   · Balance: sitting EOB - SBA-CGA; static standing - ModA; dynamic standing - ModA     Sitting EOB - BLE exercises performed - ankle pumps, LAQ, marches x8 reps each      AM-PAC 6 CLICK MOBILITY  Turning over in bed (including adjusting bedclothes, sheets and blankets)?: 3  Sitting down on and standing up from a chair with arms (e.g., wheelchair, bedside commode, etc.): 2  Moving from lying on back to sitting on the side of the bed?: 2  Moving to and from a bed to a chair (including a wheelchair)?: 2  Need to walk in hospital room?: 1  Climbing 3-5 steps with a railing?: 1  Basic Mobility Total Score: 11       Therapeutic Activities and Exercises:  -Patient educated on the continued role and goal of PT  -Questions and concerns answered within the the PT scope of practice.   -White board updated in patient room to reflect level of assistance needed with nursing.     Patient left with bed in chair position with all lines intact, call button in reach, bed alarm on, RN notified and [employer] (e-sitter notified) present..    GOALS:   Multidisciplinary Problems     Physical Therapy Goals        Problem: Physical Therapy Goal    Goal Priority Disciplines Outcome Goal Variances Interventions   Physical Therapy Goal      PT, PT/OT Ongoing, Progressing     Description:  Goals to be met by: 3/8/2020    Patient will increase functional independence with mobility by performin. Supine to sit with MInimal Assistance  2. Sit to supine with MInimal Assistance  3. Sit to stand transfer with Moderate Assistance  4. Gait  x 15 feet with Minimal Assistance using LRAD  5. Lower extremity exercise program x15 reps per handout, with independence                     Time Tracking:     PT Received On: 20  PT Start Time: 849     PT Stop Time: 917  PT Total Time (min): 28 min     Billable Minutes: Therapeutic Activity 14 and Neuromuscular Re-education 14    Treatment Type: Treatment  PT/PTA: PT     PTA Visit Number: 0     Elisa Cross, PT  2020

## 2020-02-13 NOTE — ASSESSMENT & PLAN NOTE
- Patient with reports of new onset paroxysmal afib during course of hospital stay  - Appears to be in sinus rhythm at the time of initial exam  - Continue metoprolol. Patient was getting 25 mg QID prior to transfer. Unclear if this was out of caution for hypotension or if increased frequency was needed for continued rate control. Will keep at this dose for now as patient may have received some doses prior to transfer. Consider switching to metoprolol succinate in the morning.   - Patient not anticoagulated at this time due to significant hematuria prior to transfer  * [id number] Score for Atrial Fibrillation Stroke Ris  RESULT SUMMARY:  7 points  Stroke risk was 11.2% per year in >90,000 patients (the [place] Atrial Fibrillation Cohort Study) and 15.7% risk of stroke/TIA/systemic embolism  INPUTS:  Age --> 1 = 65-74  Sex --> 1 = Female  CHF history --> 0 = No  Hypertension history --> 1 = Yes  Stroke/TIA/thromboembolism history --> 2 = Yes  Vascular disease history (prior MI, peripheral artery disease, or aortic plaque) --> 1 = Yes  Diabetes history --> 1 = Yes  -on apixaban 5mg BID

## 2020-02-13 NOTE — SUBJECTIVE & OBJECTIVE
Interval History: NAEON. Patient continuing IV abx and pending placement    Review of Systems   Constitutional: Negative for chills and fever.   Cardiovascular: Negative for chest pain.   Gastrointestinal: Negative for abdominal pain, constipation and vomiting.     Objective:     Vital Signs (Most Recent):  Temp: 98.2 °F (36.8 °C) (02/12/20 1541)  Pulse: 90 (02/12/20 1541)  Resp: 16 (02/12/20 1541)  BP: (!) 141/88 (02/12/20 1541)  SpO2: (!) 86 % (02/12/20 1541) Vital Signs (24h Range):  Temp:  [97.8 °F (36.6 °C)-98.9 °F (37.2 °C)] 98.2 °F (36.8 °C)  Pulse:  [80-93] 90  Resp:  [16-18] 16  SpO2:  [86 %-100 %] 86 %  BP: (127-155)/(57-88) 141/88     Weight: 77.6 kg (171 lb 1.2 oz)  Body mass index is 28.47 kg/m².  No intake or output data in the 24 hours ending 02/12/20 1835   Physical Exam   Constitutional: She appears well-developed and well-nourished.   HENT:   Head: Normocephalic and atraumatic.   Eyes: Pupils are equal, round, and reactive to light. EOM are normal.   Neck: Normal range of motion. No tracheal deviation present.   Cardiovascular: Normal heart sounds.   No murmur heard.  Pulmonary/Chest: She has no wheezes. She has rales.   Abdominal: Soft. Bowel sounds are normal. There is no tenderness.   Percutaneous drain   Musculoskeletal: Normal range of motion. She exhibits no edema.   Neurological: She is alert.       Significant Labs: All pertinent labs within the past 24 hours have been reviewed.    Significant Imaging: I have reviewed all pertinent imaging results/findings within the past 24 hours.

## 2020-02-13 NOTE — PT/OT/SLP PROGRESS
Occupational Therapy   Treatment    Name: Jeni Chacon  MRN: [id number]  Admitting Diagnosis:  Abscess of sigmoid colon       Recommendations:     Discharge Recommendations: nursing facility, skilled  Discharge Equipment Recommendations:  (TBD pending progress)  Barriers to discharge:  (increased level of assistance at this time. )    Assessment:     Jeni Chacon is a 74 y.o. female with a medical diagnosis of Abscess of sigmoid colon.  Performance deficits affecting function are weakness, impaired endurance, impaired self care skills, impaired functional mobilty, gait instability, impaired balance, decreased lower extremity function, impaired cognition, decreased safety awareness, decreased coordination, impaired coordination, impaired cardiopulmonary response to activity, decreased upper extremity function. Patient tolerated treatment session, but did need redirection to tasks during OT session. Sill recommending SNF to ensure safety, reduce burden of care, and maximize functional independence prior to discharge home.     Rehab Prognosis:  Good; patient would benefit from acute skilled OT services to address these deficits and reach maximum level of function.       Plan:     Patient to be seen 3 x/week to address the above listed problems via self-care/home management, therapeutic activities, therapeutic exercises, cognitive retraining, neuromuscular re-education  · Plan of Care Expires: 03/09/20  · Plan of Care Reviewed with: patient    Subjective     Pain/Comfort:  · Pain Rating 1: 0/10  · Pain Rating Post-Intervention 1: 0/10    Objective:     Communicated with: NSG prior to session.  Patient found supine with telemetry, peripheral IV(ostomy bag) upon OT entry to room.    General Precautions: Standard, aspiration, fall, dental soft   Orthopedic Precautions:N/A   Braces: N/A     Occupational Performance:     Bed Mobility:    · Patient completed Rolling/Turning to Left with  minimum assistance  · Patient completed  Rolling/Turning to Right with minimum assistance  · Patient completed Scooting/Bridging with moderate assistance  · Patient completed Supine to Sit with moderate assistance  · Patient completed Sit to Supine with moderate assistance     Functional Mobility/Transfers:  · Patient completed Sit <> Stand Transfer with moderate assistance and of 2 persons  with  hand-held assist and rolling walker  multiple attempts    Activities of Daily Living:  · Feeding:  modified independence    · Grooming: setup for washing face only sitting EOB    · Lower Body Dressing: moderate assistance Shell and donning socks EOB  (patient needed CGA to maintain balance when EOB performing this task)  · Toileting: total assistance patient has a PureWik and was soiled needing to be cleaned. New PureWik replaced at end of OT session      [facility] 6 Click ADL: 16    Treatment & Education:  Role of OT and POC  Safety  ADL retraining  Functional mobility training  Importance of OOB activity    Patient sat EOB for more than 10 min with SBA<>CGA while performing the tasks above.     Patient left HOB elevated with call button in reach, bed alarm on, NSG notified and [employer] (e-sitter notified) presentEducation:  . Patient left eating breakfast also.    GOALS:   Multidisciplinary Problems     Occupational Therapy Goals        Problem: Occupational Therapy Goal    Goal Priority Disciplines Outcome Interventions   Occupational Therapy Goal     OT, PT/OT Ongoing, Progressing    Description:  Pt will complete GH tasks with stand by assistance  Pt will perform bed mobility with stand by assist  Pt will complete UB dressing with contact guard assist  Pt will follow simple commands 2/3 trials for GH task  Pt will sit edge of bed for >10 minutes for functional task with contact guard assist Met                    Time Tracking:     OT Date of Treatment: 02/13/20  OT Start Time: 0849  OT Stop Time: 0917  OT Total Time (min): 28 min    Billable Minutes:Self  Care/Home Management 28    NADEEN Hughes  2/13/2020

## 2020-02-13 NOTE — PLAN OF CARE
02/13/20 0945   Post-Acute Status   Post-Acute Authorization Placement   Post-Acute Placement Status Referrals Sent

## 2020-02-13 NOTE — PLAN OF CARE
Problem: Occupational Therapy Goal  Goal: Occupational Therapy Goal  Description  Pt will complete GH tasks with stand by assistance  Pt will perform bed mobility with stand by assist  Pt will complete UB dressing with contact guard assist  Pt will follow simple commands 2/3 trials for GH task  Pt will sit edge of bed for >10 minutes for functional task with contact guard assist Met   Outcome: Ongoing, Progressing   Patient's goals are appropriate.   NADEEN Hughes  2/13/2020

## 2020-02-13 NOTE — PLAN OF CARE
Problem: Physical Therapy Goal  Goal: Physical Therapy Goal  Description  Goals to be met by: 3/8/2020    Patient will increase functional independence with mobility by performin. Supine to sit with MInimal Assistance  2. Sit to supine with MInimal Assistance  3. Sit to stand transfer with Moderate Assistance  4. Gait  x 15 feet with Minimal Assistance using LRAD  5. Lower extremity exercise program x15 reps per handout, with independence    Outcome: Ongoing, Progressing   Patient tolerated treatment well. Established POC and goals reviewed and remain appropriate. Plan is to continue to improve patient's functional mobility capabilities.      Elisa Cross, PT, DPT  2020

## 2020-02-13 NOTE — SUBJECTIVE & OBJECTIVE
Interval History: NAEON. Patient continues to improve and is pending placement.  Review of Systems   Constitutional: Negative for chills and fever.   Cardiovascular: Negative for chest pain.   Gastrointestinal: Negative for abdominal pain, constipation and vomiting.     Objective:     Vital Signs (Most Recent):  Temp: 98.7 °F (37.1 °C) (02/13/20 1154)  Pulse: 84 (02/13/20 1154)  Resp: 18 (02/13/20 1154)  BP: (!) 149/75 (02/13/20 1154)  SpO2: 95 % (02/13/20 1154) Vital Signs (24h Range):  Temp:  [98.2 °F (36.8 °C)-99.5 °F (37.5 °C)] 98.7 °F (37.1 °C)  Pulse:  [80-95] 84  Resp:  [16-18] 18  SpO2:  [86 %-96 %] 95 %  BP: (120-149)/(61-91) 149/75     Weight: 77.6 kg (171 lb 1.2 oz)  Body mass index is 28.47 kg/m².    Intake/Output Summary (Last 24 hours) at 2/13/2020 1407  Last data filed at 2/12/2020 1900  Gross per 24 hour   Intake --   Output 400 ml   Net -400 ml      Physical Exam   Constitutional: She appears well-developed and well-nourished.   HENT:   Head: Normocephalic and atraumatic.   Eyes: Pupils are equal, round, and reactive to light. EOM are normal.   Neck: Normal range of motion. No tracheal deviation present.   Cardiovascular: Normal heart sounds.   No murmur heard.  Pulmonary/Chest: Effort normal. She has no wheezes. She has no rales.   Abdominal: Soft. Bowel sounds are normal. There is no tenderness.   Percutaneous drain   Musculoskeletal: Normal range of motion. She exhibits no edema.   Neurological: She is alert.   Skin: Skin is warm and dry.       Significant Labs: All pertinent labs within the past 24 hours have been reviewed.    Significant Imaging: I have reviewed all pertinent imaging results/findings within the past 24 hours.

## 2020-02-14 LAB
POCT GLUCOSE: 176 MG/DL (ref 70–110)
POCT GLUCOSE: 176 MG/DL (ref 70–110)
POCT GLUCOSE: 243 MG/DL (ref 70–110)
POCT GLUCOSE: 257 MG/DL (ref 70–110)
VANCOMYCIN TROUGH SERPL-MCNC: 17.7 UG/ML (ref 10–22)

## 2020-02-14 PROCEDURE — 99232 SBSQ HOSP IP/OBS MODERATE 35: CPT | Mod: ,,, | Performed by: HOSPITALIST

## 2020-02-14 PROCEDURE — 63600175 PHARM REV CODE 636 W HCPCS: Performed by: STUDENT IN AN ORGANIZED HEALTH CARE EDUCATION/TRAINING PROGRAM

## 2020-02-14 PROCEDURE — 27000221 HC OXYGEN, UP TO 24 HOURS

## 2020-02-14 PROCEDURE — 94761 N-INVAS EAR/PLS OXIMETRY MLT: CPT

## 2020-02-14 PROCEDURE — 80202 ASSAY OF VANCOMYCIN: CPT

## 2020-02-14 PROCEDURE — 63600175 PHARM REV CODE 636 W HCPCS: Performed by: INTERNAL MEDICINE

## 2020-02-14 PROCEDURE — 94799 UNLISTED PULMONARY SVC/PX: CPT

## 2020-02-14 PROCEDURE — 25000003 PHARM REV CODE 250: Performed by: STUDENT IN AN ORGANIZED HEALTH CARE EDUCATION/TRAINING PROGRAM

## 2020-02-14 PROCEDURE — 11000001 HC ACUTE MED/SURG PRIVATE ROOM

## 2020-02-14 PROCEDURE — 25000003 PHARM REV CODE 250: Performed by: INTERNAL MEDICINE

## 2020-02-14 PROCEDURE — 99232 PR SUBSEQUENT HOSPITAL CARE,LEVL II: ICD-10-PCS | Mod: ,,, | Performed by: HOSPITALIST

## 2020-02-14 PROCEDURE — 36415 COLL VENOUS BLD VENIPUNCTURE: CPT

## 2020-02-14 RX ADMIN — METRONIDAZOLE 500 MG: 500 TABLET ORAL at 09:02

## 2020-02-14 RX ADMIN — INSULIN ASPART 2 UNITS: 100 INJECTION, SOLUTION INTRAVENOUS; SUBCUTANEOUS at 11:02

## 2020-02-14 RX ADMIN — DOCUSATE SODIUM - SENNOSIDES 1 TABLET: 50; 8.6 TABLET, FILM COATED ORAL at 10:02

## 2020-02-14 RX ADMIN — METOPROLOL SUCCINATE 100 MG: 100 TABLET, EXTENDED RELEASE ORAL at 10:02

## 2020-02-14 RX ADMIN — METRONIDAZOLE 500 MG: 500 TABLET ORAL at 04:02

## 2020-02-14 RX ADMIN — LEVOTHYROXINE SODIUM 200 MCG: 100 TABLET ORAL at 06:02

## 2020-02-14 RX ADMIN — APIXABAN 5 MG: 5 TABLET, FILM COATED ORAL at 09:02

## 2020-02-14 RX ADMIN — POLYETHYLENE GLYCOL 3350 17 G: 17 POWDER, FOR SOLUTION ORAL at 10:02

## 2020-02-14 RX ADMIN — METRONIDAZOLE 500 MG: 500 TABLET ORAL at 06:02

## 2020-02-14 RX ADMIN — APIXABAN 5 MG: 5 TABLET, FILM COATED ORAL at 10:02

## 2020-02-14 RX ADMIN — AMLODIPINE BESYLATE 10 MG: 10 TABLET ORAL at 10:02

## 2020-02-14 RX ADMIN — CIPROFLOXACIN HYDROCHLORIDE 500 MG: 500 TABLET, FILM COATED ORAL at 09:02

## 2020-02-14 RX ADMIN — CIPROFLOXACIN HYDROCHLORIDE 500 MG: 500 TABLET, FILM COATED ORAL at 10:02

## 2020-02-14 RX ADMIN — VANCOMYCIN HYDROCHLORIDE 1250 MG: 1.25 INJECTION, POWDER, LYOPHILIZED, FOR SOLUTION INTRAVENOUS at 04:02

## 2020-02-14 NOTE — PLAN OF CARE
02/14/20 0920   Post-Acute Status   Post-Acute Authorization Placement   Post-Acute Placement Status Referrals Sent     [facility] consulted for SNF placement.

## 2020-02-14 NOTE — PROGRESS NOTES
[facility] Hospital Medicine  Progress Note    Patient Name: Jeni Chacon  MRN: [id number]  Patient Class: IP- Inpatient   Admission Date: 2/7/2020  Length of Stay: 7 days  Attending Physician: Juan Barbosa MD  Primary Care Provider: [facility] Doctor [facility] Medicine Team: [facility] MED 2 Ac Lou MD    Subjective:     Principal Problem:Abscess of sigmoid colon        HPI:  Patient is a 74 year old female with medical history significant for hypothyroidism, HTN, DMII with neuropathy, tobacco abuse (with no diagnosis of COPD), and history of CVA s/p  shunt who initially presented to [place] with complaints of SOB and abdominal pain. Although patient is oriented to person, time, and place, she still is having some odd behavior. So I gathered her history from her, her medical chart, and from calling her daughter. She reports that late January she had been having abdominal pain with associated SOB for a few weeks; despite a heavy smoking history, family reports that she has never had SOB or a diagnosis of COPD. She reports no alleviating or aggravating factors, just that the pain was sharp and diffuse.   She was admitted on 1/19 and had a complicated hospital course. Briefly, patient underwent CT abdomen on 1/20 which showed evidence of cholecystitis as well as an sigmoid colon abscess. She had a percutaneous cholecystostomy tube placed on 1/24. The abscess was not drained as IR felt it was unsafe to do so. During her hospital course, her blood cultures were positive for MSSA and she had evidence of UTI. Her antibiotics were changed a few times, but she currently is on cipro and flagyl for intraabdominal infection prophylaxis and on vanc and rifampin for MSSA bacteremia. KESHIA showed inferobasal hypokinesis and EF of 55%; no vegetation noted.  In addition to this she had O2 saturation in the mid-80s. Initially she was placed on bipap and treated for COPD exacerbation;  she completed a course of steroids and got one dose of Lasix. However her respiratory status worsening and she was placed in MICU and required intubation from 1/20-1/31. She was Bipap dependent following extubation until 2/6 and is now on nasal cannula; of note, she did require pressors during MICU stay. On top of all this, patient also developed paroxysmal a fib. CHADSVASC elevated however she could not be anticoagulated due to gross hematuria requiring blood transfusion. Patient transferred for second IR opinion regarding drainage of sigmoid abscess and out of confusion and frustration from the multiple consultants at [place].     At the time of my exam, patient was tachycardic, tachypneic, and mildly hypertension. She was AAO x3, however denied all symptoms on review of systems despite appear to have increased work of breathing. She reports that this is her baseline breathing and that she is on 3 L of O2 at home, however she exhibits odd behavior like trying to squirm out of the bed, or being unable to sign blood consent because it does not look right. Of note, patient was transferred with PICC line, however per chart review, it appears that her bacteremia resulted from cultures on 1/20, and the first vanc trough was taken on 1/23. Follow up culture have been negative and patient reports that she got her PICC line more recently. In addition, patient was transferred with NG tube. She reports that she is able to swallow and that the last time she ate was yesterday, however per nursing hand off, patient was to get formal speech eval which was not done due to transfer.      Overview/Hospital Course:  Patient admitted with sigmoid abscess for IR drainage, evaluated by IR and per repeat imaging, size reduced and no intervention. Also came with a percutaneous cholecystostomy tube placed for acute cholecystitis.   She is also evaluated by ID and continuing Vancomycin, Ciprofloxacin and Flagyl. Speech evaluated, to  continue dysphagia diet.    Interval History: NAEON. Patient continues to improve and is pending placement.    Review of Systems   Constitutional: Negative for chills and fever.   Cardiovascular: Negative for chest pain.   Gastrointestinal: Negative for abdominal pain, constipation and vomiting.     Objective:     Vital Signs (Most Recent):  Temp: 98.1 °F (36.7 °C) (02/14/20 0738)  Pulse: 82 (02/14/20 0900)  Resp: 16 (02/14/20 0900)  BP: 134/77 (02/14/20 0738)  SpO2: (!) 94 % (02/14/20 0900) Vital Signs (24h Range):  Temp:  [98.1 °F (36.7 °C)-98.6 °F (37 °C)] 98.1 °F (36.7 °C)  Pulse:  [77-84] 82  Resp:  [16-20] 16  SpO2:  [75 %-96 %] 94 %  BP: (129-164)/(62-83) 134/77     Weight: 77.6 kg (171 lb 1.2 oz)  Body mass index is 28.47 kg/m².    Intake/Output Summary (Last 24 hours) at 2/14/2020 1518  Last data filed at 2/14/2020 0641  Gross per 24 hour   Intake --   Output 1050 ml   Net -1050 ml      Physical Exam   Constitutional: She appears well-developed and well-nourished.   HENT:   Head: Normocephalic and atraumatic.   Eyes: Pupils are equal, round, and reactive to light. EOM are normal.   Neck: Normal range of motion. No tracheal deviation present.   Cardiovascular: Normal heart sounds.   No murmur heard.  Pulmonary/Chest: Effort normal. She has no wheezes. She has no rales.   Abdominal: Soft. Bowel sounds are normal. There is no tenderness.   Percutaneous drain   Musculoskeletal: Normal range of motion. She exhibits no edema.   Neurological: She is alert.   Skin: Skin is warm and dry.       Significant Labs: All pertinent labs within the past 24 hours have been reviewed.    Significant Imaging: I have reviewed all pertinent imaging results/findings within the past 24 hours.      Assessment/Plan:      * Abscess of sigmoid colon  - Patient with evidence of  an abscess of 3.5 x 5 x 5.3 cm insinuated between the sigmoid colon and the dome of the urinary bladder, possibly in the setting of sigmoid diverticulitis  -  Not drained by IR at the time as it was deemed unsafe to do so  - Family wanted transfer for IR second opinion.   - Evaluated by IR, decreased size of the sigmoid collection, intervention deferred for now  - Blood cultures x2 collected, per outside hospital records, blood cultures have been clear since 1/20 ( See MSSA bacteremia)  - Continue cipro flagyl for now, lactate wnl  -4 weeks IV ABX per ID, to be finished at LTAC      Dysphagia  -diet: pureed w/ nectar thick liquids        Paroxysmal A-fib  - Patient with reports of new onset paroxysmal afib during course of hospital stay  - Appears to be in sinus rhythm at the time of initial exam  - Continue metoprolol. Patient was getting 25 mg QID prior to transfer. Unclear if this was out of caution for hypotension or if increased frequency was needed for continued rate control. Will keep at this dose for now as patient may have received some doses prior to transfer. Consider switching to metoprolol succinate in the morning.   - Patient not anticoagulated at this time due to significant hematuria prior to transfer  * [id number] Score for Atrial Fibrillation Stroke Ris  RESULT SUMMARY:  7 points  Stroke risk was 11.2% per year in >90,000 patients (the [place] Atrial Fibrillation Cohort Study) and 15.7% risk of stroke/TIA/systemic embolism  INPUTS:  Age --> 1 = 65-74  Sex --> 1 = Female  CHF history --> 0 = No  Hypertension history --> 1 = Yes  Stroke/TIA/thromboembolism history --> 2 = Yes  Vascular disease history (prior MI, peripheral artery disease, or aortic plaque) --> 1 = Yes  Diabetes history --> 1 = Yes  -on apixaban 5mg BID          Hematuria  Blood loss anemia    - Patient with complicated hospital course including significant hematuria leading to blood loss anemia and need for blood transfusion.   - Hg stable at 8.5 for now. Patient has purewick, no signs of gross hematuria. INR 1.1  - type and screen ordered, blood consent signed  - Will keep cbc at daily  frequency for now. If Hg with significant drop in the morning, consider increasing frequency and transfuse for Hg<7  - in terms of anticoagulation (see paroxysmal afib), hold for now. If Hg remains stable, consider starting anticoagulation        COPD (chronic obstructive pulmonary disease)  Respiratory failure with hypoxia  Respiratory distress    - Patient treated for COPD exacerbation with full course of steroids and breathing treatments prior to transfer  - Patient does not have formal diagnosis of COPD but reports heavy smoking history at a pack a day for many years  - Patient also reports using 3 L O2 at home, but daughter is not sure. Unspecified chronicity of hypoxic respiratory failure  - Will continue breathing treatment q6 and incentive spirometry  - Will follow up vbg as patient has apparent increased work of breathing.  - Patient is not on any inhalers at home, she will likely need rescue inhaler and inhaled corticosteroid at minimum at discharge       MSSA bacteremia  - Patient with evidence of MSSA bacteremia on blood cultures taken on 1/20. First vanc trough drawn on 1/23  - Sensitivities show that staph aureus is sensitive to cefazolin and oxacillin. However patient is still on vanc with documented allergy to penicillins. However patient is unclear as to what reaction she gets and daughter is unclear as well  - In addition, it appears that patient would have received full course of antibiotic for bacteremia as no further blood cultures were positive for MSSA after 1/20 cultures  - Blood cultures repeated  - Evaluated by ID appreciate recs, continue Vancomycin  Off note  shunt was tapped by MANDEEP in OSH on 1/25 with negative studies  Discussed with NSGY, as patient does not have meningitis,  shunt is not the source of MSSA and if menigitis suspected to first evaluate with LP.         Discharge planning issues  Debility  Overweight    -Arranging d/c to LTAC      Cholecystitis  - Ct scan done on  1/20 showed gallbladder to be markedly distended with sludge and stones, and with an indistinct wall. There was some pericholecystic inflammation in the posterior part. She had a percutaneous cholecystostomy tube placed on 1/24 for the acute cholecystitis  - Drain still in place  - Will continue cipro flagyl for intraabdominal prophylaxis as patient also has sigmoid colon abscess       Type 2 diabetes mellitus, without long-term current use of insulin  - HA1c 7.3 last October   - Repeat ordered  - Patient was on 6 units of insulin basal plus sliding scale prior to transfer, she does not use insulin at home  - Will keep sliding scale insulin for now. Calculate daily insulin needs, place on basal if greater glucose control is needed; no longer being treated with steroids      Hypothyroid  - Continue home levothyroxine.  - 2/7 thyroid labs show TSH elevated at 32, but T4 wnl of 1.05    Essential hypertension  - Patient takes amlodipine and hydrochlorothiazide at home  - metoprolol started for rate control for new onset afib prior to transfer  - Will resume metoprolol and amlodipine as patient is mildly hypertensive  - Resume hydrochlorothiazide as needed        VTE Risk Mitigation (From admission, onward)         Ordered     apixaban tablet 5 mg  2 times daily      02/13/20 0927     Place sequential compression device  Until discontinued      02/08/20 0233     IP VTE HIGH RISK PATIENT  Once      02/07/20 1915                      Ac Lou MD  Department of Hospital Medicine   [facility]

## 2020-02-14 NOTE — PLAN OF CARE
PT AAOx4, VSS, no complaints of pain, remained free of falls and injuries. BS monitoring maintained per orders. Aspiration precautions and Tele sitter maintained per orders. Pt Drain flushed per orders. No acute changes, will continue to monitor.   Problem: Adult Inpatient Plan of Care  Goal: Plan of Care Review  Outcome: Ongoing, Progressing  Flowsheets (Taken 2/14/2020 0717)  Plan of Care Reviewed With: patient  Goal: Patient-Specific Goal (Individualization)  Outcome: Ongoing, Progressing  Goal: Absence of Hospital-Acquired Illness or Injury  Outcome: Ongoing, Progressing  Intervention: Identify and Manage Fall Risk  Flowsheets (Taken 2/14/2020 0717)  Safety Promotion/Fall Prevention: assistive device/personal item within reach; bed alarm set; diversional activities provided; Fall Risk reviewed with patient/family; Fall Risk signage in place; lighting adjusted; medications reviewed; nonskid shoes/socks when out of bed; /camera at bedside; side rails raised x 2  Intervention: Prevent VTE (venous thromboembolism)  Flowsheets (Taken 2/14/2020 0717)  VTE Prevention/Management: bleeding precautions maintained; bleeding risk assessed; bleeding risk factor(s) identified, provider notified; fluids promoted  Goal: Optimal Comfort and Wellbeing  Outcome: Ongoing, Progressing  Intervention: Provide Person-Centered Care  Flowsheets (Taken 2/14/2020 0717)  Trust Relationship/Rapport: care explained; choices provided; emotional support provided; questions encouraged; questions answered; empathic listening provided; thoughts/feelings acknowledged; reassurance provided  Goal: Readiness for Transition of Care  Outcome: Ongoing, Progressing  Goal: Rounds/Family Conference  Outcome: Ongoing, Progressing     Problem: Diabetes Comorbidity  Goal: Blood Glucose Level Within Desired Range  Outcome: Ongoing, Progressing  Intervention: Maintain Glycemic Control  Flowsheets (Taken 2/14/2020 0717)  Glycemic Management: blood  glucose monitoring     Problem: Infection  Goal: Infection Symptom Resolution  Outcome: Ongoing, Progressing  Intervention: Prevent or Manage Infection  Flowsheets (Taken 2/14/2020 0717)  Fever Reduction/Comfort Measures: lightweight clothing; lightweight bedding  Infection Management: aseptic technique maintained  Isolation Precautions: protective environment maintained     Problem: Fall Injury Risk  Goal: Absence of Fall and Fall-Related Injury  Outcome: Ongoing, Progressing  Intervention: Identify and Manage Contributors to Fall Injury Risk  Flowsheets (Taken 2/14/2020 0717)  Self-Care Promotion: independence encouraged; BADL personal objects within reach; BADL personal routines maintained  Medication Review/Management: medications reviewed  Intervention: Promote Injury-Free Environment  Flowsheets (Taken 2/14/2020 0717)  Safety Promotion/Fall Prevention: assistive device/personal item within reach; bed alarm set; diversional activities provided; Fall Risk reviewed with patient/family; Fall Risk signage in place; lighting adjusted; medications reviewed; nonskid shoes/socks when out of bed; /camera at bedside; side rails raised x 2  Environmental Safety Modification: assistive device/personal items within reach; clutter free environment maintained; lighting adjusted; room organization consistent     Problem: Skin Injury Risk Increased  Goal: Skin Health and Integrity  Outcome: Ongoing, Progressing  Intervention: Optimize Skin Protection  Flowsheets (Taken 2/14/2020 0717)  Pressure Reduction Techniques: frequent weight shift encouraged; weight shift assistance provided  Pressure Reduction Devices: pressure-redistributing mattress utilized; positioning supports utilized  Skin Protection: tubing/devices free from skin contact  Intervention: Promote and Optimize Oral Intake  Flowsheets (Taken 2/14/2020 0717)  Oral Nutrition Promotion: calorie dense foods provided

## 2020-02-14 NOTE — SUBJECTIVE & OBJECTIVE
Interval History: NAEON. Patient continues to improve and is pending placement.    Review of Systems   Constitutional: Negative for chills and fever.   Cardiovascular: Negative for chest pain.   Gastrointestinal: Negative for abdominal pain, constipation and vomiting.     Objective:     Vital Signs (Most Recent):  Temp: 98.1 °F (36.7 °C) (02/14/20 0738)  Pulse: 82 (02/14/20 0900)  Resp: 16 (02/14/20 0900)  BP: 134/77 (02/14/20 0738)  SpO2: (!) 94 % (02/14/20 0900) Vital Signs (24h Range):  Temp:  [98.1 °F (36.7 °C)-98.6 °F (37 °C)] 98.1 °F (36.7 °C)  Pulse:  [77-84] 82  Resp:  [16-20] 16  SpO2:  [75 %-96 %] 94 %  BP: (129-164)/(62-83) 134/77     Weight: 77.6 kg (171 lb 1.2 oz)  Body mass index is 28.47 kg/m².    Intake/Output Summary (Last 24 hours) at 2/14/2020 1518  Last data filed at 2/14/2020 0641  Gross per 24 hour   Intake --   Output 1050 ml   Net -1050 ml      Physical Exam   Constitutional: She appears well-developed and well-nourished.   HENT:   Head: Normocephalic and atraumatic.   Eyes: Pupils are equal, round, and reactive to light. EOM are normal.   Neck: Normal range of motion. No tracheal deviation present.   Cardiovascular: Normal heart sounds.   No murmur heard.  Pulmonary/Chest: Effort normal. She has no wheezes. She has no rales.   Abdominal: Soft. Bowel sounds are normal. There is no tenderness.   Percutaneous drain   Musculoskeletal: Normal range of motion. She exhibits no edema.   Neurological: She is alert.   Skin: Skin is warm and dry.       Significant Labs: All pertinent labs within the past 24 hours have been reviewed.    Significant Imaging: I have reviewed all pertinent imaging results/findings within the past 24 hours.

## 2020-02-15 LAB
ANION GAP SERPL CALC-SCNC: 7 MMOL/L (ref 8–16)
BASOPHILS # BLD AUTO: 0.02 K/UL (ref 0–0.2)
BASOPHILS NFR BLD: 0.4 % (ref 0–1.9)
BUN SERPL-MCNC: 4 MG/DL (ref 8–23)
CALCIUM SERPL-MCNC: 8.3 MG/DL (ref 8.7–10.5)
CHLORIDE SERPL-SCNC: 107 MMOL/L (ref 95–110)
CO2 SERPL-SCNC: 26 MMOL/L (ref 23–29)
CREAT SERPL-MCNC: 0.9 MG/DL (ref 0.5–1.4)
DIFFERENTIAL METHOD: ABNORMAL
EOSINOPHIL # BLD AUTO: 0.2 K/UL (ref 0–0.5)
EOSINOPHIL NFR BLD: 3.5 % (ref 0–8)
ERYTHROCYTE [DISTWIDTH] IN BLOOD BY AUTOMATED COUNT: 19.7 % (ref 11.5–14.5)
EST. GFR  (AFRICAN AMERICAN): >60 ML/MIN/1.73 M^2
EST. GFR  (NON AFRICAN AMERICAN): >60 ML/MIN/1.73 M^2
GLUCOSE SERPL-MCNC: 145 MG/DL (ref 70–110)
HCT VFR BLD AUTO: 29.7 % (ref 37–48.5)
HGB BLD-MCNC: 9.2 G/DL (ref 12–16)
IMM GRANULOCYTES # BLD AUTO: 0.02 K/UL (ref 0–0.04)
IMM GRANULOCYTES NFR BLD AUTO: 0.4 % (ref 0–0.5)
LYMPHOCYTES # BLD AUTO: 1.2 K/UL (ref 1–4.8)
LYMPHOCYTES NFR BLD: 22.7 % (ref 18–48)
MCH RBC QN AUTO: 28 PG (ref 27–31)
MCHC RBC AUTO-ENTMCNC: 31 G/DL (ref 32–36)
MCV RBC AUTO: 91 FL (ref 82–98)
MONOCYTES # BLD AUTO: 0.9 K/UL (ref 0.3–1)
MONOCYTES NFR BLD: 16.2 % (ref 4–15)
NEUTROPHILS # BLD AUTO: 3.1 K/UL (ref 1.8–7.7)
NEUTROPHILS NFR BLD: 56.8 % (ref 38–73)
NRBC BLD-RTO: 0 /100 WBC
PLATELET # BLD AUTO: 440 K/UL (ref 150–350)
PMV BLD AUTO: 9.1 FL (ref 9.2–12.9)
POCT GLUCOSE: 167 MG/DL (ref 70–110)
POCT GLUCOSE: 194 MG/DL (ref 70–110)
POCT GLUCOSE: 195 MG/DL (ref 70–110)
POCT GLUCOSE: 257 MG/DL (ref 70–110)
POCT GLUCOSE: 263 MG/DL (ref 70–110)
POTASSIUM SERPL-SCNC: 3.2 MMOL/L (ref 3.5–5.1)
RBC # BLD AUTO: 3.28 M/UL (ref 4–5.4)
SODIUM SERPL-SCNC: 140 MMOL/L (ref 136–145)
WBC # BLD AUTO: 5.38 K/UL (ref 3.9–12.7)

## 2020-02-15 PROCEDURE — 11000001 HC ACUTE MED/SURG PRIVATE ROOM

## 2020-02-15 PROCEDURE — 94761 N-INVAS EAR/PLS OXIMETRY MLT: CPT

## 2020-02-15 PROCEDURE — 99232 PR SUBSEQUENT HOSPITAL CARE,LEVL II: ICD-10-PCS | Mod: ,,, | Performed by: HOSPITALIST

## 2020-02-15 PROCEDURE — 63600175 PHARM REV CODE 636 W HCPCS: Performed by: INTERNAL MEDICINE

## 2020-02-15 PROCEDURE — 25000003 PHARM REV CODE 250: Performed by: STUDENT IN AN ORGANIZED HEALTH CARE EDUCATION/TRAINING PROGRAM

## 2020-02-15 PROCEDURE — 85025 COMPLETE CBC W/AUTO DIFF WBC: CPT

## 2020-02-15 PROCEDURE — 80048 BASIC METABOLIC PNL TOTAL CA: CPT

## 2020-02-15 PROCEDURE — 25000003 PHARM REV CODE 250: Performed by: INTERNAL MEDICINE

## 2020-02-15 PROCEDURE — 99232 SBSQ HOSP IP/OBS MODERATE 35: CPT | Mod: ,,, | Performed by: HOSPITALIST

## 2020-02-15 PROCEDURE — 36415 COLL VENOUS BLD VENIPUNCTURE: CPT

## 2020-02-15 RX ADMIN — METRONIDAZOLE 500 MG: 500 TABLET ORAL at 02:02

## 2020-02-15 RX ADMIN — INSULIN ASPART 1 UNITS: 100 INJECTION, SOLUTION INTRAVENOUS; SUBCUTANEOUS at 06:02

## 2020-02-15 RX ADMIN — ACETAMINOPHEN 650 MG: 325 TABLET ORAL at 05:02

## 2020-02-15 RX ADMIN — METRONIDAZOLE 500 MG: 500 TABLET ORAL at 06:02

## 2020-02-15 RX ADMIN — METOPROLOL SUCCINATE 100 MG: 100 TABLET, EXTENDED RELEASE ORAL at 09:02

## 2020-02-15 RX ADMIN — CIPROFLOXACIN HYDROCHLORIDE 500 MG: 500 TABLET, FILM COATED ORAL at 09:02

## 2020-02-15 RX ADMIN — INSULIN ASPART 4 UNITS: 100 INJECTION, SOLUTION INTRAVENOUS; SUBCUTANEOUS at 11:02

## 2020-02-15 RX ADMIN — LEVOTHYROXINE SODIUM 200 MCG: 100 TABLET ORAL at 06:02

## 2020-02-15 RX ADMIN — APIXABAN 5 MG: 5 TABLET, FILM COATED ORAL at 09:02

## 2020-02-15 RX ADMIN — METRONIDAZOLE 500 MG: 500 TABLET ORAL at 09:02

## 2020-02-15 RX ADMIN — INSULIN ASPART 1 UNITS: 100 INJECTION, SOLUTION INTRAVENOUS; SUBCUTANEOUS at 09:02

## 2020-02-15 RX ADMIN — POLYETHYLENE GLYCOL 3350 17 G: 17 POWDER, FOR SOLUTION ORAL at 09:02

## 2020-02-15 RX ADMIN — VANCOMYCIN HYDROCHLORIDE 1250 MG: 1.25 INJECTION, POWDER, LYOPHILIZED, FOR SOLUTION INTRAVENOUS at 03:02

## 2020-02-15 RX ADMIN — AMLODIPINE BESYLATE 10 MG: 10 TABLET ORAL at 09:02

## 2020-02-15 RX ADMIN — DOCUSATE SODIUM - SENNOSIDES 1 TABLET: 50; 8.6 TABLET, FILM COATED ORAL at 09:02

## 2020-02-15 NOTE — PROGRESS NOTES
Negative mammo letter mailed. mohit Pereyra     Pharmacokinetic Assessment Follow Up: IV Vancomycin    Vancomycin serum concentration assessment(s):  · Trough of 17.7 mcg/mL was drawn appropriately and will be used to guide therapy  · Therapeutic trough for target of 15-20 mcg/mL    Vancomycin Regimen Plan:  1. Will continue current dose of vancomycin 1250mg q24 hours  2. May repeat trough in 5-7 days or sooner if renal function changes    Drug levels (last 3 results):  Recent Labs   Lab Result Units 02/14/20  1601   Vancomycin-Trough ug/mL 17.7       Pharmacy will continue to follow and monitor vancomycin.    Please contact pharmacy at extension [phone] for questions regarding this assessment.    Thank you for the consult,   Marta Quintanilla       Patient brief summary:  Jeni Chacon is a 74 y.o. female initiated on antimicrobial therapy with IV Vancomycin for treatment of bacteremia      Drug Allergies:   Review of patient's allergies indicates:   Allergen Reactions    Lisinopril Swelling    Penicillins        Renal Function:   Estimated Creatinine Clearance: 58.2 mL/min (based on SCr of 0.9 mg/dL).,       CBC (last 72 hours):  Recent Labs   Lab Result Units 02/15/20  0554   WBC K/uL 5.38   Hemoglobin g/dL 9.2*   Hematocrit % 29.7*   Platelets K/uL 440*   Gran% % 56.8   Lymph% % 22.7   Mono% % 16.2*   Eosinophil% % 3.5   Basophil% % 0.4   Differential Method  Automated       Metabolic Panel (last 72 hours):  Recent Labs   Lab Result Units 02/15/20  0554   Sodium mmol/L 140   Potassium mmol/L 3.2*   Chloride mmol/L 107   CO2 mmol/L 26   Glucose mg/dL 145*   BUN, Bld mg/dL 4*   Creatinine mg/dL 0.9       Vancomycin Administrations:  vancomycin given in the last 96 hours                   vancomycin 1.25 g in dextrose 5% 250 mL IVPB (ready to mix) (mg) 1,250 mg New Bag 02/14/20 1648     1,250 mg New Bag 02/13/20 1718     1,250 mg New Bag 02/12/20 1557     1,250 mg New Bag 02/11/20 1658                Microbiologic Results:  Microbiology Results (last 7 days)      Procedure Component Value Units Date/Time    Blood culture (site 2) [[id number]] Collected:  02/07/20 1847    Order Status:  Completed Specimen:  Blood Updated:  02/12/20 2212     Blood Culture, Routine No growth after 5 days.    Narrative:       Site # 2, aerobic only    Blood culture (site 1) [[id number]] Collected:  02/07/20 1847    Order Status:  Completed Specimen:  Blood Updated:  02/12/20 2212     Blood Culture, Routine No growth after 5 days.    Narrative:       Site # 1, aerobic and anaerobic    Respiratory Infection Panel, Nasopharyngeal [[id number]] Collected:  02/09/20 2338    Order Status:  Completed Specimen:  Nasopharyngeal Swab Updated:  02/10/20 0122     Respiratory Infection Panel Source NP Swab     Adenovirus Not Detected     Coronavirus 229E Not Detected     Coronavirus HKU1 Not Detected     Coronavirus NL63 Not Detected     Coronavirus OC43 Not Detected     Human Metapneumovirus Not Detected     Human Rhinovirus/Enterovirus Not Detected     Influenza A (subtypes H1, H1-2009,H3) Not Detected     Influenza B Not Detected     Parainfluenza Virus 1 Not Detected     Parainfluenza Virus 2 Not Detected     Parainfluenza Virus 3 Not Detected     Parainfluenza Virus 4 Not Detected     Respiratory Syncytial Virus Not Detected     Bordetella Parapertussis (UD4064) Not Detected     Bordetella pertussis (ptxP) Not Detected     Chlamydia pneumoniae Not Detected     Mycoplasma pneumoniae Not Detected     Comment: Respiratory Infection Panel testing performed by Multiplex PCR.       Narrative:       For all other respiratory sources order [id number] Respiratory  Viral Panel by PCR (RVPCR)

## 2020-02-16 PROBLEM — E87.6 HYPOKALEMIA: Status: ACTIVE | Noted: 2020-02-16

## 2020-02-16 LAB
POCT GLUCOSE: 225 MG/DL (ref 70–110)
POCT GLUCOSE: 228 MG/DL (ref 70–110)
POCT GLUCOSE: 247 MG/DL (ref 70–110)
POCT GLUCOSE: 249 MG/DL (ref 70–110)

## 2020-02-16 PROCEDURE — 99232 PR SUBSEQUENT HOSPITAL CARE,LEVL II: ICD-10-PCS | Mod: ,,, | Performed by: HOSPITALIST

## 2020-02-16 PROCEDURE — C9399 UNCLASSIFIED DRUGS OR BIOLOG: HCPCS | Performed by: HOSPITALIST

## 2020-02-16 PROCEDURE — 25000003 PHARM REV CODE 250: Performed by: STUDENT IN AN ORGANIZED HEALTH CARE EDUCATION/TRAINING PROGRAM

## 2020-02-16 PROCEDURE — 63600175 PHARM REV CODE 636 W HCPCS: Performed by: INTERNAL MEDICINE

## 2020-02-16 PROCEDURE — 99232 SBSQ HOSP IP/OBS MODERATE 35: CPT | Mod: ,,, | Performed by: HOSPITALIST

## 2020-02-16 PROCEDURE — 63600175 PHARM REV CODE 636 W HCPCS: Performed by: STUDENT IN AN ORGANIZED HEALTH CARE EDUCATION/TRAINING PROGRAM

## 2020-02-16 PROCEDURE — 11000001 HC ACUTE MED/SURG PRIVATE ROOM

## 2020-02-16 PROCEDURE — 63600175 PHARM REV CODE 636 W HCPCS: Performed by: HOSPITALIST

## 2020-02-16 PROCEDURE — 25000003 PHARM REV CODE 250: Performed by: INTERNAL MEDICINE

## 2020-02-16 RX ORDER — IBUPROFEN 200 MG
24 TABLET ORAL
Status: DISCONTINUED | OUTPATIENT
Start: 2020-02-16 | End: 2020-02-25 | Stop reason: HOSPADM

## 2020-02-16 RX ORDER — INSULIN ASPART 100 [IU]/ML
0-5 INJECTION, SOLUTION INTRAVENOUS; SUBCUTANEOUS
Status: DISCONTINUED | OUTPATIENT
Start: 2020-02-16 | End: 2020-02-25 | Stop reason: HOSPADM

## 2020-02-16 RX ORDER — INSULIN ASPART 100 [IU]/ML
5 INJECTION, SOLUTION INTRAVENOUS; SUBCUTANEOUS
Status: DISCONTINUED | OUTPATIENT
Start: 2020-02-16 | End: 2020-02-16

## 2020-02-16 RX ORDER — GLUCAGON 1 MG
1 KIT INJECTION
Status: DISCONTINUED | OUTPATIENT
Start: 2020-02-16 | End: 2020-02-25 | Stop reason: HOSPADM

## 2020-02-16 RX ORDER — INSULIN ASPART 100 [IU]/ML
4 INJECTION, SOLUTION INTRAVENOUS; SUBCUTANEOUS
Status: DISCONTINUED | OUTPATIENT
Start: 2020-02-16 | End: 2020-02-25 | Stop reason: HOSPADM

## 2020-02-16 RX ORDER — INSULIN ASPART 100 [IU]/ML
3 INJECTION, SOLUTION INTRAVENOUS; SUBCUTANEOUS
Status: DISCONTINUED | OUTPATIENT
Start: 2020-02-16 | End: 2020-02-16

## 2020-02-16 RX ORDER — POTASSIUM CHLORIDE 20 MEQ/1
40 TABLET, EXTENDED RELEASE ORAL ONCE
Status: COMPLETED | OUTPATIENT
Start: 2020-02-16 | End: 2020-02-16

## 2020-02-16 RX ORDER — IBUPROFEN 200 MG
16 TABLET ORAL
Status: DISCONTINUED | OUTPATIENT
Start: 2020-02-16 | End: 2020-02-25 | Stop reason: HOSPADM

## 2020-02-16 RX ADMIN — INSULIN ASPART 2 UNITS: 100 INJECTION, SOLUTION INTRAVENOUS; SUBCUTANEOUS at 11:02

## 2020-02-16 RX ADMIN — INSULIN ASPART 4 UNITS: 100 INJECTION, SOLUTION INTRAVENOUS; SUBCUTANEOUS at 05:02

## 2020-02-16 RX ADMIN — METOPROLOL SUCCINATE 100 MG: 100 TABLET, EXTENDED RELEASE ORAL at 09:02

## 2020-02-16 RX ADMIN — INSULIN ASPART 2 UNITS: 100 INJECTION, SOLUTION INTRAVENOUS; SUBCUTANEOUS at 05:02

## 2020-02-16 RX ADMIN — APIXABAN 5 MG: 5 TABLET, FILM COATED ORAL at 09:02

## 2020-02-16 RX ADMIN — METRONIDAZOLE 500 MG: 500 TABLET ORAL at 06:02

## 2020-02-16 RX ADMIN — METRONIDAZOLE 500 MG: 500 TABLET ORAL at 01:02

## 2020-02-16 RX ADMIN — CIPROFLOXACIN HYDROCHLORIDE 500 MG: 500 TABLET, FILM COATED ORAL at 09:02

## 2020-02-16 RX ADMIN — VANCOMYCIN HYDROCHLORIDE 1250 MG: 1.25 INJECTION, POWDER, LYOPHILIZED, FOR SOLUTION INTRAVENOUS at 03:02

## 2020-02-16 RX ADMIN — INSULIN DETEMIR 5 UNITS: 100 INJECTION, SOLUTION SUBCUTANEOUS at 09:02

## 2020-02-16 RX ADMIN — POLYETHYLENE GLYCOL 3350 17 G: 17 POWDER, FOR SOLUTION ORAL at 09:02

## 2020-02-16 RX ADMIN — INSULIN ASPART 1 UNITS: 100 INJECTION, SOLUTION INTRAVENOUS; SUBCUTANEOUS at 09:02

## 2020-02-16 RX ADMIN — INSULIN ASPART 4 UNITS: 100 INJECTION, SOLUTION INTRAVENOUS; SUBCUTANEOUS at 07:02

## 2020-02-16 RX ADMIN — DOCUSATE SODIUM - SENNOSIDES 1 TABLET: 50; 8.6 TABLET, FILM COATED ORAL at 09:02

## 2020-02-16 RX ADMIN — METRONIDAZOLE 500 MG: 500 TABLET ORAL at 09:02

## 2020-02-16 RX ADMIN — LEVOTHYROXINE SODIUM 200 MCG: 100 TABLET ORAL at 06:02

## 2020-02-16 RX ADMIN — INSULIN ASPART 3 UNITS: 100 INJECTION, SOLUTION INTRAVENOUS; SUBCUTANEOUS at 11:02

## 2020-02-16 RX ADMIN — POTASSIUM CHLORIDE 40 MEQ: 1500 TABLET, EXTENDED RELEASE ORAL at 03:02

## 2020-02-16 RX ADMIN — AMLODIPINE BESYLATE 10 MG: 10 TABLET ORAL at 09:02

## 2020-02-16 NOTE — PROGRESS NOTES
[facility] Hospital Medicine  Progress Note    Patient Name: Jeni Chacon  MRN: [id number]  Patient Class: IP- Inpatient   Admission Date: 2/7/2020  Length of Stay: 8 days  Attending Physician: Juan Barbosa MD  Primary Care Provider: [facility] Medicine Team: [facility] MED 2 Chani Mckenzie MD    Subjective:     Principal Problem:Abscess of sigmoid colon        HPI:  Patient is a 74 year old female with medical history significant for hypothyroidism, HTN, DMII with neuropathy, tobacco abuse (with no diagnosis of COPD), and history of CVA s/p  shunt who initially presented to [facility] with complaints of SOB and abdominal pain. Although patient is oriented to person, time, and place, she still is having some odd behavior. So I gathered her history from her, her medical chart, and from calling her daughter. She reports that late January she had been having abdominal pain with associated SOB for a few weeks; despite a heavy smoking history, family reports that she has never had SOB or a diagnosis of COPD. She reports no alleviating or aggravating factors, just that the pain was sharp and diffuse.   She was admitted on 1/19 and had a complicated hospital course. Briefly, patient underwent CT abdomen on 1/20 which showed evidence of cholecystitis as well as an sigmoid colon abscess. She had a percutaneous cholecystostomy tube placed on 1/24. The abscess was not drained as IR felt it was unsafe to do so. During her hospital course, her blood cultures were positive for MSSA and she had evidence of UTI. Her antibiotics were changed a few times, but she currently is on cipro and flagyl for intraabdominal infection prophylaxis and on vanc and rifampin for MSSA bacteremia. KESHIA showed inferobasal hypokinesis and EF of 55%; no vegetation noted.  In addition to this she had O2 saturation in the mid-80s. Initially she was placed on bipap and treated for COPD exacerbation; she  completed a course of steroids and got one dose of Lasix. However her respiratory status worsening and she was placed in MICU and required intubation from 1/20-1/31. She was Bipap dependent following extubation until 2/6 and is now on nasal cannula; of note, she did require pressors during MICU stay. On top of all this, patient also developed paroxysmal a fib. CHADSVASC elevated however she could not be anticoagulated due to gross hematuria requiring blood transfusion. Patient transferred for second IR opinion regarding drainage of sigmoid abscess and out of confusion and frustration from the multiple consultants at [place].     At the time of my exam, patient was tachycardic, tachypneic, and mildly hypertension. She was AAO x3, however denied all symptoms on review of systems despite appear to have increased work of breathing. She reports that this is her baseline breathing and that she is on 3 L of O2 at home, however she exhibits odd behavior like trying to squirm out of the bed, or being unable to sign blood consent because it does not look right. Of note, patient was transferred with PICC line, however per chart review, it appears that her bacteremia resulted from cultures on 1/20, and the first vanc trough was taken on 1/23. Follow up culture have been negative and patient reports that she got her PICC line more recently. In addition, patient was transferred with NG tube. She reports that she is able to swallow and that the last time she ate was yesterday, however per nursing hand off, patient was to get formal speech eval which was not done due to transfer.      Overview/Hospital Course:  Patient admitted with sigmoid abscess for IR drainage, evaluated by IR and per repeat imaging, size reduced and no intervention. Also came with a percutaneous cholecystostomy tube placed for acute cholecystitis.   She is also evaluated by ID and continuing Vancomycin, Ciprofloxacin and Flagyl. Speech evaluated, to  continue dysphagia diet.    Interval History: NAEON. Patient pending discharge to LTAC    Review of Systems   Constitutional: Negative for chills and fever.   Cardiovascular: Negative for chest pain.   Gastrointestinal: Negative for abdominal pain, constipation and vomiting.     Objective:     Vital Signs (Most Recent):  Temp: 100 °F (37.8 °C) (02/15/20 1722)  Pulse: 80 (02/15/20 1646)  Resp: 18 (02/15/20 1646)  BP: 130/68 (02/15/20 1646)  SpO2: (!) 93 % (02/15/20 1646) Vital Signs (24h Range):  Temp:  [97.9 °F (36.6 °C)-100 °F (37.8 °C)] 100 °F (37.8 °C)  Pulse:  [70-81] 80  Resp:  [14-18] 18  SpO2:  [93 %-100 %] 93 %  BP: (114-139)/(63-74) 130/68     Weight: 77.6 kg (171 lb 1.2 oz)  Body mass index is 28.47 kg/m².    Intake/Output Summary (Last 24 hours) at 2/15/2020 1817  Last data filed at 2/15/2020 1100  Gross per 24 hour   Intake 180 ml   Output 1045 ml   Net -865 ml      Physical Exam   Constitutional: She appears well-developed and well-nourished.   HENT:   Head: Normocephalic and atraumatic.   Eyes: Pupils are equal, round, and reactive to light. EOM are normal.   Neck: Normal range of motion. No tracheal deviation present.   Cardiovascular: Normal heart sounds.   No murmur heard.  Pulmonary/Chest: Effort normal. She has no wheezes. She has no rales.   Abdominal: Soft. Bowel sounds are normal. There is no tenderness.   Percutaneous drain   Musculoskeletal: Normal range of motion. She exhibits no edema.   Neurological: She is alert.   Skin: Skin is warm and dry.       Significant Labs: All pertinent labs within the past 24 hours have been reviewed.    Significant Imaging: I have reviewed all pertinent imaging results/findings within the past 24 hours.      Assessment/Plan:      * Abscess of sigmoid colon  - Patient with evidence of  an abscess of 3.5 x 5 x 5.3 cm insinuated between the sigmoid colon and the dome of the urinary bladder, possibly in the setting of sigmoid diverticulitis  - Not drained by IR at  the time as it was deemed unsafe to do so  - Family wanted transfer for IR second opinion.   - Evaluated by IR, decreased size of the sigmoid collection, intervention deferred for now  - Blood cultures x2 collected, per outside hospital records, blood cultures have been clear since 1/20 ( See MSSA bacteremia)  - Continue cipro flagyl for now, lactate wnl  -4 weeks IV ABX per ID, to be finished at LTAC      Dysphagia  -diet: pureed w/ nectar thick liquids        Paroxysmal A-fib  - Patient with reports of new onset paroxysmal afib during course of hospital stay  - Appears to be in sinus rhythm at the time of initial exam  - Continue metoprolol. Patient was getting 25 mg QID prior to transfer. Unclear if this was out of caution for hypotension or if increased frequency was needed for continued rate control. Will keep at this dose for now as patient may have received some doses prior to transfer. Consider switching to metoprolol succinate in the morning.   - Patient not anticoagulated at this time due to significant hematuria prior to transfer  * [id number] Score for Atrial Fibrillation Stroke Ris  RESULT SUMMARY:  7 points  Stroke risk was 11.2% per year in >90,000 patients (the [place] Atrial Fibrillation Cohort Study) and 15.7% risk of stroke/TIA/systemic embolism  INPUTS:  Age --> 1 = 65-74  Sex --> 1 = Female  CHF history --> 0 = No  Hypertension history --> 1 = Yes  Stroke/TIA/thromboembolism history --> 2 = Yes  Vascular disease history (prior MI, peripheral artery disease, or aortic plaque) --> 1 = Yes  Diabetes history --> 1 = Yes  -on apixaban 5mg BID          Hematuria  Blood loss anemia    - Patient with complicated hospital course including significant hematuria leading to blood loss anemia and need for blood transfusion.   - Hg stable at 8.5 for now. Patient has purewick, no signs of gross hematuria. INR 1.1  - type and screen ordered, blood consent signed  - Will keep cbc at daily frequency for now. If  Hg with significant drop in the morning, consider increasing frequency and transfuse for Hg<7  - in terms of anticoagulation (see paroxysmal afib), hold for now. If Hg remains stable, consider starting anticoagulation        COPD (chronic obstructive pulmonary disease)  Respiratory failure with hypoxia  Respiratory distress    - Patient treated for COPD exacerbation with full course of steroids and breathing treatments prior to transfer  - Patient does not have formal diagnosis of COPD but reports heavy smoking history at a pack a day for many years  - Patient also reports using 3 L O2 at home, but daughter is not sure. Unspecified chronicity of hypoxic respiratory failure  - Will continue breathing treatment q6 and incentive spirometry  - Will follow up vbg as patient has apparent increased work of breathing.  - Patient is not on any inhalers at home, she will likely need rescue inhaler and inhaled corticosteroid at minimum at discharge       MSSA bacteremia  - Patient with evidence of MSSA bacteremia on blood cultures taken on 1/20. First vanc trough drawn on 1/23  - Sensitivities show that staph aureus is sensitive to cefazolin and oxacillin. However patient is still on vanc with documented allergy to penicillins. However patient is unclear as to what reaction she gets and daughter is unclear as well  - In addition, it appears that patient would have received full course of antibiotic for bacteremia as no further blood cultures were positive for MSSA after 1/20 cultures  - Blood cultures repeated  - Evaluated by ID appreciate recs, continue Vancomycin  Off note  shunt was tapped by MANDEEP in OSH on 1/25 with negative studies  Discussed with NSGY, as patient does not have meningitis,  shunt is not the source of MSSA and if menigitis suspected to first evaluate with LP.         Discharge planning issues  Debility  Overweight    -Arranging d/c to LTAC      Cholecystitis  - Ct scan done on 1/20 showed gallbladder  to be markedly distended with sludge and stones, and with an indistinct wall. There was some pericholecystic inflammation in the posterior part. She had a percutaneous cholecystostomy tube placed on 1/24 for the acute cholecystitis  - Drain still in place  - Will continue cipro flagyl for intraabdominal prophylaxis as patient also has sigmoid colon abscess       Type 2 diabetes mellitus, without long-term current use of insulin  - HA1c 7.3 last October   - Repeat ordered  - Patient was on 6 units of insulin basal plus sliding scale prior to transfer, she does not use insulin at home  - Will keep sliding scale insulin for now. Calculate daily insulin needs, place on basal if greater glucose control is needed; no longer being treated with steroids      Hypothyroid  - Continue home levothyroxine.  - 2/7 thyroid labs show TSH elevated at 32, but T4 wnl of 1.05    Essential hypertension  - Patient takes amlodipine and hydrochlorothiazide at home  - metoprolol started for rate control for new onset afib prior to transfer  - Will resume metoprolol and amlodipine as patient is mildly hypertensive  - Resume hydrochlorothiazide as needed        VTE Risk Mitigation (From admission, onward)         Ordered     apixaban tablet 5 mg  2 times daily      02/13/20 0927     Place sequential compression device  Until discontinued      02/08/20 0233     IP VTE HIGH RISK PATIENT  Once      02/07/20 1915                      Cahni Mckenzie MD  Department of Hospital Medicine   [facility]

## 2020-02-16 NOTE — SUBJECTIVE & OBJECTIVE
Interval History: NAEON. Patient pending discharge to LTAC    Review of Systems   Constitutional: Negative for chills and fever.   Cardiovascular: Negative for chest pain.   Gastrointestinal: Negative for abdominal pain, constipation and vomiting.     Objective:     Vital Signs (Most Recent):  Temp: 100 °F (37.8 °C) (02/15/20 1722)  Pulse: 80 (02/15/20 1646)  Resp: 18 (02/15/20 1646)  BP: 130/68 (02/15/20 1646)  SpO2: (!) 93 % (02/15/20 1646) Vital Signs (24h Range):  Temp:  [97.9 °F (36.6 °C)-100 °F (37.8 °C)] 100 °F (37.8 °C)  Pulse:  [70-81] 80  Resp:  [14-18] 18  SpO2:  [93 %-100 %] 93 %  BP: (114-139)/(63-74) 130/68     Weight: 77.6 kg (171 lb 1.2 oz)  Body mass index is 28.47 kg/m².    Intake/Output Summary (Last 24 hours) at 2/15/2020 1817  Last data filed at 2/15/2020 1100  Gross per 24 hour   Intake 180 ml   Output 1045 ml   Net -865 ml      Physical Exam   Constitutional: She appears well-developed and well-nourished.   HENT:   Head: Normocephalic and atraumatic.   Eyes: Pupils are equal, round, and reactive to light. EOM are normal.   Neck: Normal range of motion. No tracheal deviation present.   Cardiovascular: Normal heart sounds.   No murmur heard.  Pulmonary/Chest: Effort normal. She has no wheezes. She has no rales.   Abdominal: Soft. Bowel sounds are normal. There is no tenderness.   Percutaneous drain   Musculoskeletal: Normal range of motion. She exhibits no edema.   Neurological: She is alert.   Skin: Skin is warm and dry.       Significant Labs: All pertinent labs within the past 24 hours have been reviewed.    Significant Imaging: I have reviewed all pertinent imaging results/findings within the past 24 hours.

## 2020-02-16 NOTE — PLAN OF CARE
Problem: Adult Inpatient Plan of Care  Goal: Plan of Care Review  Outcome: Ongoing, Progressing  Goal: Patient-Specific Goal (Individualization)  Outcome: Ongoing, Progressing  Goal: Absence of Hospital-Acquired Illness or Injury  Outcome: Ongoing, Progressing  Goal: Optimal Comfort and Wellbeing  Outcome: Ongoing, Progressing  Goal: Readiness for Transition of Care  Outcome: Ongoing, Progressing  Goal: Rounds/Family Conference  Outcome: Ongoing, Progressing     Problem: Adult Inpatient Plan of Care  Goal: Patient-Specific Goal (Individualization)  Outcome: Ongoing, Progressing   AAOx2. Vitals stable. Medications given as ordered. Tolerated well. No complaints of pain or discomfort. Will continue to monitor

## 2020-02-16 NOTE — SUBJECTIVE & OBJECTIVE
Interval History: NAEON. Patient boarding until LTAC placement    Review of Systems   Constitutional: Negative for chills and fever.   Cardiovascular: Negative for chest pain.   Gastrointestinal: Negative for abdominal pain, constipation and vomiting.     Objective:     Vital Signs (Most Recent):  Temp: 98.1 °F (36.7 °C) (02/16/20 1136)  Pulse: 75 (02/16/20 1136)  Resp: 16 (02/16/20 1136)  BP: (!) 140/73 (02/16/20 1136)  SpO2: 96 % (02/16/20 1136) Vital Signs (24h Range):  Temp:  [98.1 °F (36.7 °C)-100 °F (37.8 °C)] 98.1 °F (36.7 °C)  Pulse:  [75-88] 75  Resp:  [16-18] 16  SpO2:  [92 %-98 %] 96 %  BP: (129-142)/(68-77) 140/73     Weight: 77.6 kg (171 lb 1.2 oz)  Body mass index is 28.47 kg/m².    Intake/Output Summary (Last 24 hours) at 2/16/2020 1422  Last data filed at 2/16/2020 1325  Gross per 24 hour   Intake 180 ml   Output 400 ml   Net -220 ml      Physical Exam   Constitutional: She appears well-developed and well-nourished.   HENT:   Head: Normocephalic and atraumatic.   Eyes: Pupils are equal, round, and reactive to light. EOM are normal.   Neck: Normal range of motion. No tracheal deviation present.   Cardiovascular: Normal heart sounds.   No murmur heard.  Pulmonary/Chest: Effort normal. She has no wheezes. She has no rales.   Abdominal: Soft. Bowel sounds are normal. There is no tenderness.   Percutaneous drain   Musculoskeletal: Normal range of motion. She exhibits no edema.   Neurological: She is alert.   Skin: Skin is warm and dry.       Significant Labs: All pertinent labs within the past 24 hours have been reviewed.    Significant Imaging: I have reviewed all pertinent imaging results/findings within the past 24 hours.

## 2020-02-16 NOTE — PLAN OF CARE
Problem: Adult Inpatient Plan of Care  Goal: Plan of Care Review  Outcome: Ongoing, Progressing  Goal: Patient-Specific Goal (Individualization)  Outcome: Ongoing, Progressing  Goal: Absence of Hospital-Acquired Illness or Injury  Outcome: Ongoing, Progressing  Goal: Optimal Comfort and Wellbeing  Outcome: Ongoing, Progressing  Goal: Readiness for Transition of Care  Outcome: Ongoing, Progressing  Goal: Rounds/Family Conference  Outcome: Ongoing, Progressing     Problem: Diabetes Comorbidity  Goal: Blood Glucose Level Within Desired Range  Outcome: Ongoing, Progressing     Problem: Infection  Goal: Infection Symptom Resolution  Outcome: Ongoing, Progressing     Problem: Fall Injury Risk  Goal: Absence of Fall and Fall-Related Injury  Outcome: Ongoing, Progressing     Problem: Skin Injury Risk Increased  Goal: Skin Health and Integrity  Outcome: Ongoing, Progressing  VSS throughout shift. Meds given as ordered. PT tolerated well crushed with pudding/apple sauce. NO complaints at this time. Will continue to monitor.

## 2020-02-16 NOTE — PROGRESS NOTES
[facility] Hospital Medicine  Progress Note    Patient Name: Jeni Chacon  MRN: [id number]  Patient Class: IP- Inpatient   Admission Date: 2/7/2020  Length of Stay: 9 days  Attending Physician: Juan Barbosa MD  Primary Care Provider: [facility] Medicine Team: [facility] MED 2 Chani Mckenzie MD    Subjective:     Principal Problem:Abscess of sigmoid colon        HPI:  Patient is a 74 year old female with medical history significant for hypothyroidism, HTN, DMII with neuropathy, tobacco abuse (with no diagnosis of COPD), and history of CVA s/p  shunt who initially presented to [place] with complaints of SOB and abdominal pain. Although patient is oriented to person, time, and place, she still is having some odd behavior. So I gathered her history from her, her medical chart, and from calling her daughter. She reports that late January she had been having abdominal pain with associated SOB for a few weeks; despite a heavy smoking history, family reports that she has never had SOB or a diagnosis of COPD. She reports no alleviating or aggravating factors, just that the pain was sharp and diffuse.   She was admitted on 1/19 and had a complicated hospital course. Briefly, patient underwent CT abdomen on 1/20 which showed evidence of cholecystitis as well as an sigmoid colon abscess. She had a percutaneous cholecystostomy tube placed on 1/24. The abscess was not drained as IR felt it was unsafe to do so. During her hospital course, her blood cultures were positive for MSSA and she had evidence of UTI. Her antibiotics were changed a few times, but she currently is on cipro and flagyl for intraabdominal infection prophylaxis and on vanc and rifampin for MSSA bacteremia. KESHIA showed inferobasal hypokinesis and EF of 55%; no vegetation noted.  In addition to this she had O2 saturation in the mid-80s. Initially she was placed on bipap and treated for COPD exacerbation; she  completed a course of steroids and got one dose of Lasix. However her respiratory status worsening and she was placed in MICU and required intubation from 1/20-1/31. She was Bipap dependent following extubation until 2/6 and is now on nasal cannula; of note, she did require pressors during MICU stay. On top of all this, patient also developed paroxysmal a fib. CHADSVASC elevated however she could not be anticoagulated due to gross hematuria requiring blood transfusion. Patient transferred for second IR opinion regarding drainage of sigmoid abscess and out of confusion and frustration from the multiple consultants at [place].     At the time of my exam, patient was tachycardic, tachypneic, and mildly hypertension. She was AAO x3, however denied all symptoms on review of systems despite appear to have increased work of breathing. She reports that this is her baseline breathing and that she is on 3 L of O2 at home, however she exhibits odd behavior like trying to squirm out of the bed, or being unable to sign blood consent because it does not look right. Of note, patient was transferred with PICC line, however per chart review, it appears that her bacteremia resulted from cultures on 1/20, and the first vanc trough was taken on 1/23. Follow up culture have been negative and patient reports that she got her PICC line more recently. In addition, patient was transferred with NG tube. She reports that she is able to swallow and that the last time she ate was yesterday, however per nursing hand off, patient was to get formal speech eval which was not done due to transfer.      Overview/Hospital Course:  Patient admitted with sigmoid abscess for IR drainage, evaluated by IR and per repeat imaging, size reduced and no intervention. Also came with a percutaneous cholecystostomy tube placed for acute cholecystitis.   She is also evaluated by ID and continuing Vancomycin, Ciprofloxacin and Flagyl. Speech evaluated, to  continue dysphagia diet.    Interval History: NAEON. Patient boarding until LTAC placement    Review of Systems   Constitutional: Negative for chills and fever.   Cardiovascular: Negative for chest pain.   Gastrointestinal: Negative for abdominal pain, constipation and vomiting.     Objective:     Vital Signs (Most Recent):  Temp: 98.1 °F (36.7 °C) (02/16/20 1136)  Pulse: 75 (02/16/20 1136)  Resp: 16 (02/16/20 1136)  BP: (!) 140/73 (02/16/20 1136)  SpO2: 96 % (02/16/20 1136) Vital Signs (24h Range):  Temp:  [98.1 °F (36.7 °C)-100 °F (37.8 °C)] 98.1 °F (36.7 °C)  Pulse:  [75-88] 75  Resp:  [16-18] 16  SpO2:  [92 %-98 %] 96 %  BP: (129-142)/(68-77) 140/73     Weight: 77.6 kg (171 lb 1.2 oz)  Body mass index is 28.47 kg/m².    Intake/Output Summary (Last 24 hours) at 2/16/2020 1422  Last data filed at 2/16/2020 1325  Gross per 24 hour   Intake 180 ml   Output 400 ml   Net -220 ml      Physical Exam   Constitutional: She appears well-developed and well-nourished.   HENT:   Head: Normocephalic and atraumatic.   Eyes: Pupils are equal, round, and reactive to light. EOM are normal.   Neck: Normal range of motion. No tracheal deviation present.   Cardiovascular: Normal heart sounds.   No murmur heard.  Pulmonary/Chest: Effort normal. She has no wheezes. She has no rales.   Abdominal: Soft. Bowel sounds are normal. There is no tenderness.   Percutaneous drain   Musculoskeletal: Normal range of motion. She exhibits no edema.   Neurological: She is alert.   Skin: Skin is warm and dry.       Significant Labs: All pertinent labs within the past 24 hours have been reviewed.    Significant Imaging: I have reviewed all pertinent imaging results/findings within the past 24 hours.      Assessment/Plan:      * Abscess of sigmoid colon  - Patient with evidence of  an abscess of 3.5 x 5 x 5.3 cm insinuated between the sigmoid colon and the dome of the urinary bladder, possibly in the setting of sigmoid diverticulitis  - Not drained by  IR at the time as it was deemed unsafe to do so  - Family wanted transfer for IR second opinion.   - Evaluated by IR, decreased size of the sigmoid collection, intervention deferred for now  - Blood cultures x2 collected, per outside hospital records, blood cultures have been clear since 1/20 ( See MSSA bacteremia)  - Continue cipro flagyl for now, lactate wnl  -4 weeks IV ABX per ID, to be finished at LTAC      Hypokalemia  -replete prn      Dysphagia  -diet: pureed w/ nectar thick liquids        Paroxysmal A-fib  - Patient with reports of new onset paroxysmal afib during course of hospital stay  - Appears to be in sinus rhythm at the time of initial exam  - Continue metoprolol. Patient was getting 25 mg QID prior to transfer. Unclear if this was out of caution for hypotension or if increased frequency was needed for continued rate control. Will keep at this dose for now as patient may have received some doses prior to transfer. Consider switching to metoprolol succinate in the morning.   - Patient not anticoagulated at this time due to significant hematuria prior to transfer  * [id number] Score for Atrial Fibrillation Stroke Ris  RESULT SUMMARY:  7 points  Stroke risk was 11.2% per year in >90,000 patients (the [place] Atrial Fibrillation Cohort Study) and 15.7% risk of stroke/TIA/systemic embolism  INPUTS:  Age --> 1 = 65-74  Sex --> 1 = Female  CHF history --> 0 = No  Hypertension history --> 1 = Yes  Stroke/TIA/thromboembolism history --> 2 = Yes  Vascular disease history (prior MI, peripheral artery disease, or aortic plaque) --> 1 = Yes  Diabetes history --> 1 = Yes  -on apixaban 5mg BID          Hematuria  Blood loss anemia    - Patient with complicated hospital course including significant hematuria leading to blood loss anemia and need for blood transfusion.   - Hg stable at 8.5 for now. Patient has purewick, no signs of gross hematuria. INR 1.1  - type and screen ordered, blood consent signed  - Will  keep cbc at q48 hours for now. If Hg with significant drop in the morning, consider increasing frequency and transfuse for Hg<7  - in terms of anticoagulation (see paroxysmal afib), hold for now. If Hg remains stable, consider starting anticoagulation        COPD (chronic obstructive pulmonary disease)  Respiratory failure with hypoxia  Respiratory distress    - Patient treated for COPD exacerbation with full course of steroids and breathing treatments prior to transfer  - Patient does not have formal diagnosis of COPD but reports heavy smoking history at a pack a day for many years  - Patient also reports using 3 L O2 at home, but daughter is not sure. Unspecified chronicity of hypoxic respiratory failure  - Will continue breathing treatment q6 and incentive spirometry  - Will follow up vbg as patient has apparent increased work of breathing.  - Patient is not on any inhalers at home, she will likely need rescue inhaler and inhaled corticosteroid at minimum at discharge       MSSA bacteremia  - Patient with evidence of MSSA bacteremia on blood cultures taken on 1/20. First vanc trough drawn on 1/23  - Sensitivities show that staph aureus is sensitive to cefazolin and oxacillin. However patient is still on vanc with documented allergy to penicillins. However patient is unclear as to what reaction she gets and daughter is unclear as well  - In addition, it appears that patient would have received full course of antibiotic for bacteremia as no further blood cultures were positive for MSSA after 1/20 cultures  - Blood cultures repeated  - Evaluated by ID yola recs, continue Vancomycin  Off note  shunt was tapped by MANDEEP in OSH on 1/25 with negative studies  Discussed with MANDEEP, as patient does not have meningitis,  shunt is not the source of MSSA and if menigitis suspected to first evaluate with LP.         Discharge planning issues  Debility  Overweight    -Arranging d/c to LTAC      Cholecystitis  - Ct scan  done on 1/20 showed gallbladder to be markedly distended with sludge and stones, and with an indistinct wall. There was some pericholecystic inflammation in the posterior part. She had a percutaneous cholecystostomy tube placed on 1/24 for the acute cholecystitis  - Drain still in place  - Will continue cipro flagyl for intraabdominal prophylaxis as patient also has sigmoid colon abscess       Type 2 diabetes mellitus, without long-term current use of insulin  - HA1c 7.3 last October   - Repeat ordered  - Patient was on 6 units of insulin basal plus sliding scale prior to transfer, she does not use insulin at home  - Will keep sliding scale insulin for now. Calculate daily insulin needs, place on basal if greater glucose control is needed; no longer being treated with steroids      Hypothyroid  - Continue home levothyroxine.  - 2/7 thyroid labs show TSH elevated at 32, but T4 wnl of 1.05    Essential hypertension  - Patient takes amlodipine and hydrochlorothiazide at home  - metoprolol started for rate control for new onset afib prior to transfer  - Will resume metoprolol and amlodipine as patient is mildly hypertensive  - Resume hydrochlorothiazide as needed        VTE Risk Mitigation (From admission, onward)         Ordered     apixaban tablet 5 mg  2 times daily      02/13/20 0927     Place sequential compression device  Until discontinued      02/08/20 0233     IP VTE HIGH RISK PATIENT  Once      02/07/20 1915                      Chani Mckenzie MD  Department of Hospital Medicine   [facility]

## 2020-02-17 LAB
ANION GAP SERPL CALC-SCNC: 9 MMOL/L (ref 8–16)
BASOPHILS # BLD AUTO: 0.03 K/UL (ref 0–0.2)
BASOPHILS NFR BLD: 0.5 % (ref 0–1.9)
BUN SERPL-MCNC: 5 MG/DL (ref 8–23)
CALCIUM SERPL-MCNC: 8.1 MG/DL (ref 8.7–10.5)
CHLORIDE SERPL-SCNC: 106 MMOL/L (ref 95–110)
CO2 SERPL-SCNC: 23 MMOL/L (ref 23–29)
CREAT SERPL-MCNC: 0.9 MG/DL (ref 0.5–1.4)
DIFFERENTIAL METHOD: ABNORMAL
EOSINOPHIL # BLD AUTO: 0.2 K/UL (ref 0–0.5)
EOSINOPHIL NFR BLD: 3 % (ref 0–8)
ERYTHROCYTE [DISTWIDTH] IN BLOOD BY AUTOMATED COUNT: 19.4 % (ref 11.5–14.5)
EST. GFR  (AFRICAN AMERICAN): >60 ML/MIN/1.73 M^2
EST. GFR  (NON AFRICAN AMERICAN): >60 ML/MIN/1.73 M^2
GLUCOSE SERPL-MCNC: 159 MG/DL (ref 70–110)
HCT VFR BLD AUTO: 28.8 % (ref 37–48.5)
HGB BLD-MCNC: 8.7 G/DL (ref 12–16)
IMM GRANULOCYTES # BLD AUTO: 0.02 K/UL (ref 0–0.04)
IMM GRANULOCYTES NFR BLD AUTO: 0.3 % (ref 0–0.5)
LYMPHOCYTES # BLD AUTO: 1.2 K/UL (ref 1–4.8)
LYMPHOCYTES NFR BLD: 20.7 % (ref 18–48)
MCH RBC QN AUTO: 27.5 PG (ref 27–31)
MCHC RBC AUTO-ENTMCNC: 30.2 G/DL (ref 32–36)
MCV RBC AUTO: 91 FL (ref 82–98)
MONOCYTES # BLD AUTO: 0.9 K/UL (ref 0.3–1)
MONOCYTES NFR BLD: 14.4 % (ref 4–15)
NEUTROPHILS # BLD AUTO: 3.7 K/UL (ref 1.8–7.7)
NEUTROPHILS NFR BLD: 61.1 % (ref 38–73)
NRBC BLD-RTO: 0 /100 WBC
PLATELET # BLD AUTO: 474 K/UL (ref 150–350)
PMV BLD AUTO: 9.5 FL (ref 9.2–12.9)
POCT GLUCOSE: 175 MG/DL (ref 70–110)
POCT GLUCOSE: 180 MG/DL (ref 70–110)
POCT GLUCOSE: 196 MG/DL (ref 70–110)
POCT GLUCOSE: 214 MG/DL (ref 70–110)
POTASSIUM SERPL-SCNC: 4.7 MMOL/L (ref 3.5–5.1)
RBC # BLD AUTO: 3.16 M/UL (ref 4–5.4)
SODIUM SERPL-SCNC: 138 MMOL/L (ref 136–145)
WBC # BLD AUTO: 5.98 K/UL (ref 3.9–12.7)

## 2020-02-17 PROCEDURE — 25000003 PHARM REV CODE 250: Performed by: STUDENT IN AN ORGANIZED HEALTH CARE EDUCATION/TRAINING PROGRAM

## 2020-02-17 PROCEDURE — 11000001 HC ACUTE MED/SURG PRIVATE ROOM

## 2020-02-17 PROCEDURE — 92526 ORAL FUNCTION THERAPY: CPT

## 2020-02-17 PROCEDURE — 97535 SELF CARE MNGMENT TRAINING: CPT

## 2020-02-17 PROCEDURE — 25000003 PHARM REV CODE 250: Performed by: INTERNAL MEDICINE

## 2020-02-17 PROCEDURE — 99232 SBSQ HOSP IP/OBS MODERATE 35: CPT | Mod: ,,, | Performed by: HOSPITALIST

## 2020-02-17 PROCEDURE — 80048 BASIC METABOLIC PNL TOTAL CA: CPT

## 2020-02-17 PROCEDURE — 36415 COLL VENOUS BLD VENIPUNCTURE: CPT

## 2020-02-17 PROCEDURE — 63600175 PHARM REV CODE 636 W HCPCS: Performed by: INTERNAL MEDICINE

## 2020-02-17 PROCEDURE — 85025 COMPLETE CBC W/AUTO DIFF WBC: CPT

## 2020-02-17 PROCEDURE — 99232 PR SUBSEQUENT HOSPITAL CARE,LEVL II: ICD-10-PCS | Mod: ,,, | Performed by: HOSPITALIST

## 2020-02-17 RX ADMIN — METRONIDAZOLE 500 MG: 500 TABLET ORAL at 09:02

## 2020-02-17 RX ADMIN — DOCUSATE SODIUM - SENNOSIDES 1 TABLET: 50; 8.6 TABLET, FILM COATED ORAL at 08:02

## 2020-02-17 RX ADMIN — AMLODIPINE BESYLATE 10 MG: 10 TABLET ORAL at 08:02

## 2020-02-17 RX ADMIN — VANCOMYCIN HYDROCHLORIDE 1250 MG: 1.25 INJECTION, POWDER, LYOPHILIZED, FOR SOLUTION INTRAVENOUS at 03:02

## 2020-02-17 RX ADMIN — INSULIN ASPART 4 UNITS: 100 INJECTION, SOLUTION INTRAVENOUS; SUBCUTANEOUS at 08:02

## 2020-02-17 RX ADMIN — CIPROFLOXACIN HYDROCHLORIDE 500 MG: 500 TABLET, FILM COATED ORAL at 09:02

## 2020-02-17 RX ADMIN — INSULIN ASPART 4 UNITS: 100 INJECTION, SOLUTION INTRAVENOUS; SUBCUTANEOUS at 05:02

## 2020-02-17 RX ADMIN — INSULIN ASPART 2 UNITS: 100 INJECTION, SOLUTION INTRAVENOUS; SUBCUTANEOUS at 05:02

## 2020-02-17 RX ADMIN — ACETAMINOPHEN 650 MG: 325 TABLET ORAL at 08:02

## 2020-02-17 RX ADMIN — CIPROFLOXACIN HYDROCHLORIDE 500 MG: 500 TABLET, FILM COATED ORAL at 08:02

## 2020-02-17 RX ADMIN — APIXABAN 5 MG: 5 TABLET, FILM COATED ORAL at 08:02

## 2020-02-17 RX ADMIN — METOPROLOL SUCCINATE 100 MG: 100 TABLET, EXTENDED RELEASE ORAL at 08:02

## 2020-02-17 RX ADMIN — INSULIN ASPART 4 UNITS: 100 INJECTION, SOLUTION INTRAVENOUS; SUBCUTANEOUS at 12:02

## 2020-02-17 RX ADMIN — INSULIN DETEMIR 5 UNITS: 100 INJECTION, SOLUTION SUBCUTANEOUS at 09:02

## 2020-02-17 RX ADMIN — APIXABAN 5 MG: 5 TABLET, FILM COATED ORAL at 09:02

## 2020-02-17 RX ADMIN — METRONIDAZOLE 500 MG: 500 TABLET ORAL at 06:02

## 2020-02-17 RX ADMIN — LEVOTHYROXINE SODIUM 200 MCG: 100 TABLET ORAL at 06:02

## 2020-02-17 RX ADMIN — METRONIDAZOLE 500 MG: 500 TABLET ORAL at 03:02

## 2020-02-17 NOTE — PROGRESS NOTES
[facility] Hospital Medicine  Progress Note    Patient Name: Jeni Chacon  MRN: [id number]  Patient Class: IP- Inpatient   Admission Date: 2/7/2020  Length of Stay: 10 days  Attending Physician: Juan Barbosa MD  Primary Care Provider: [facility] Medicine Team: [facility] [place] [place] [facility]OSP MED 2 Chani Mckenzie MD    Subjective:     Principal Problem:Abscess of sigmoid colon        HPI:  Patient is a 74 year old female with medical history significant for hypothyroidism, HTN, DMII with neuropathy, tobacco abuse (with no diagnosis of COPD), and history of CVA s/p  shunt who initially presented to [place] with complaints of SOB and abdominal pain. Although patient is oriented to person, time, and place, she still is having some odd behavior. So I gathered her history from her, her medical chart, and from calling her daughter. She reports that late January she had been having abdominal pain with associated SOB for a few weeks; despite a heavy smoking history, family reports that she has never had SOB or a diagnosis of COPD. She reports no alleviating or aggravating factors, just that the pain was sharp and diffuse.   She was admitted on 1/19 and had a complicated hospital course. Briefly, patient underwent CT abdomen on 1/20 which showed evidence of cholecystitis as well as an sigmoid colon abscess. She had a percutaneous cholecystostomy tube placed on 1/24. The abscess was not drained as IR felt it was unsafe to do so. During her hospital course, her blood cultures were positive for MSSA and she had evidence of UTI. Her antibiotics were changed a few times, but she currently is on cipro and flagyl for intraabdominal infection prophylaxis and on vanc and rifampin for MSSA bacteremia. KESHIA showed inferobasal hypokinesis and EF of 55%; no vegetation noted.  In addition to this she had O2 saturation in the mid-80s. Initially she was placed on bipap and treated for COPD exacerbation; she  completed a course of steroids and got one dose of Lasix. However her respiratory status worsening and she was placed in MICU and required intubation from 1/20-1/31. She was Bipap dependent following extubation until 2/6 and is now on nasal cannula; of note, she did require pressors during MICU stay. On top of all this, patient also developed paroxysmal a fib. CHADSVASC elevated however she could not be anticoagulated due to gross hematuria requiring blood transfusion. Patient transferred for second IR opinion regarding drainage of sigmoid abscess and out of confusion and frustration from the multiple consultants at [place].     At the time of my exam, patient was tachycardic, tachypneic, and mildly hypertension. She was AAO x3, however denied all symptoms on review of systems despite appear to have increased work of breathing. She reports that this is her baseline breathing and that she is on 3 L of O2 at home, however she exhibits odd behavior like trying to squirm out of the bed, or being unable to sign blood consent because it does not look right. Of note, patient was transferred with PICC line, however per chart review, it appears that her bacteremia resulted from cultures on 1/20, and the first vanc trough was taken on 1/23. Follow up culture have been negative and patient reports that she got her PICC line more recently. In addition, patient was transferred with NG tube. She reports that she is able to swallow and that the last time she ate was yesterday, however per nursing hand off, patient was to get formal speech eval which was not done due to transfer.      Overview/Hospital Course:  Patient admitted with sigmoid abscess for IR drainage, evaluated by IR and per repeat imaging, size reduced and no intervention. Also came with a percutaneous cholecystostomy tube placed for acute cholecystitis.   She is also evaluated by ID and continuing Vancomycin, Ciprofloxacin and Flagyl. Speech evaluated, to  continue dysphagia diet.    Interval History: NAEON. Pending SNF placement.    Review of Systems   Constitutional: Negative for chills and fever.   Cardiovascular: Negative for chest pain.   Gastrointestinal: Negative for abdominal pain, constipation and vomiting.     Objective:     Vital Signs (Most Recent):  Temp: 96.1 °F (35.6 °C) (02/17/20 1114)  Pulse: 71 (02/17/20 1114)  Resp: 18 (02/17/20 0743)  BP: 122/62 (02/17/20 1114)  SpO2: (!) 92 % (02/17/20 1114) Vital Signs (24h Range):  Temp:  [96.1 °F (35.6 °C)-98.7 °F (37.1 °C)] 96.1 °F (35.6 °C)  Pulse:  [71-87] 71  Resp:  [16-18] 18  SpO2:  [92 %-97 %] 92 %  BP: (108-141)/(62-72) 122/62     Weight: 77.6 kg (171 lb 1.2 oz)  Body mass index is 28.47 kg/m².    Intake/Output Summary (Last 24 hours) at 2/17/2020 1348  Last data filed at 2/17/2020 0900  Gross per 24 hour   Intake --   Output 1100 ml   Net -1100 ml      Physical Exam   Constitutional: She appears well-developed and well-nourished.   HENT:   Head: Normocephalic and atraumatic.   Eyes: Pupils are equal, round, and reactive to light. EOM are normal.   Neck: Normal range of motion. No tracheal deviation present.   Cardiovascular: Normal heart sounds.   No murmur heard.  Pulmonary/Chest: Effort normal. She has no wheezes. She has no rales.   Abdominal: Soft. Bowel sounds are normal. There is no tenderness.   Percutaneous drain   Musculoskeletal: Normal range of motion. She exhibits no edema.   Neurological: She is alert.   Skin: Skin is warm and dry.       Significant Labs: All pertinent labs within the past 24 hours have been reviewed.    Significant Imaging: I have reviewed all pertinent imaging results/findings within the past 24 hours.      Assessment/Plan:      * Abscess of sigmoid colon  - Patient with evidence of  an abscess of 3.5 x 5 x 5.3 cm insinuated between the sigmoid colon and the dome of the urinary bladder, possibly in the setting of sigmoid diverticulitis  - Not drained by IR at the time as  it was deemed unsafe to do so  - Family wanted transfer for IR second opinion.   - Evaluated by IR, decreased size of the sigmoid collection, intervention deferred for now  - Blood cultures x2 collected, per outside hospital records, blood cultures have been clear since 1/20 ( See MSSA bacteremia)  - Continue cipro flagyl for now, lactate wnl  -4 weeks IV ABX per ID, to be finished at LTAC      Hypokalemia  -replete prn      Dysphagia  -diet: pureed w/ nectar thick liquids        Paroxysmal A-fib  - Patient with reports of new onset paroxysmal afib during course of hospital stay  - Appears to be in sinus rhythm at the time of initial exam  - Continue metoprolol. Patient was getting 25 mg QID prior to transfer. Unclear if this was out of caution for hypotension or if increased frequency was needed for continued rate control. Will keep at this dose for now as patient may have received some doses prior to transfer. Consider switching to metoprolol succinate in the morning.   - Patient not anticoagulated at this time due to significant hematuria prior to transfer  * [id number] Score for Atrial Fibrillation Stroke Ris  RESULT SUMMARY:  7 points  Stroke risk was 11.2% per year in >90,000 patients (the [place] Atrial Fibrillation Cohort Study) and 15.7% risk of stroke/TIA/systemic embolism  INPUTS:  Age --> 1 = 65-74  Sex --> 1 = Female  CHF history --> 0 = No  Hypertension history --> 1 = Yes  Stroke/TIA/thromboembolism history --> 2 = Yes  Vascular disease history (prior MI, peripheral artery disease, or aortic plaque) --> 1 = Yes  Diabetes history --> 1 = Yes  -on apixaban 5mg BID          Hematuria  Blood loss anemia    - Patient with complicated hospital course including significant hematuria leading to blood loss anemia and need for blood transfusion.   - Hg stable at 8.5 for now. Patient has purewick, no signs of gross hematuria. INR 1.1  - type and screen ordered, blood consent signed  - Will keep cbc at daily  frequency for now. If Hg with significant drop in the morning, consider increasing frequency and transfuse for Hg<7  - in terms of anticoagulation (see paroxysmal afib), hold for now. If Hg remains stable, consider starting anticoagulation        COPD (chronic obstructive pulmonary disease)  Respiratory failure with hypoxia  Respiratory distress    - Patient treated for COPD exacerbation with full course of steroids and breathing treatments prior to transfer  - Patient does not have formal diagnosis of COPD but reports heavy smoking history at a pack a day for many years  - Patient also reports using 3 L O2 at home, but daughter is not sure. Unspecified chronicity of hypoxic respiratory failure  - Will continue breathing treatment q6 and incentive spirometry  - Will follow up vbg as patient has apparent increased work of breathing.  - Patient is not on any inhalers at home, she will likely need rescue inhaler and inhaled corticosteroid at minimum at discharge       MSSA bacteremia  - Patient with evidence of MSSA bacteremia on blood cultures taken on 1/20. First vanc trough drawn on 1/23  - Sensitivities show that staph aureus is sensitive to cefazolin and oxacillin. However patient is still on vanc with documented allergy to penicillins. However patient is unclear as to what reaction she gets and daughter is unclear as well  - In addition, it appears that patient would have received full course of antibiotic for bacteremia as no further blood cultures were positive for MSSA after 1/20 cultures  - Blood cultures repeated  - Evaluated by ID appreciate recs, continue Vancomycin  Off note  shunt was tapped by MANDEEP in OSH on 1/25 with negative studies  Discussed with NSGY, as patient does not have meningitis,  shunt is not the source of MSSA and if menigitis suspected to first evaluate with LP.         Discharge planning issues  Debility  Overweight    -Arranging d/c to LTAC      Cholecystitis  - Ct scan done on  1/20 showed gallbladder to be markedly distended with sludge and stones, and with an indistinct wall. There was some pericholecystic inflammation in the posterior part. She had a percutaneous cholecystostomy tube placed on 1/24 for the acute cholecystitis  - Drain still in place  - Will continue cipro flagyl for intraabdominal prophylaxis as patient also has sigmoid colon abscess       Type 2 diabetes mellitus, without long-term current use of insulin  - HA1c 7.3 last October   - Repeat ordered  - Patient was on 6 units of insulin basal plus sliding scale prior to transfer, she does not use insulin at home  - Will keep sliding scale insulin for now. Calculate daily insulin needs, place on basal if greater glucose control is needed; no longer being treated with steroids      Hypothyroid  - Continue home levothyroxine.  - 2/7 thyroid labs show TSH elevated at 32, but T4 wnl of 1.05    Essential hypertension  - Patient takes amlodipine and hydrochlorothiazide at home  - metoprolol started for rate control for new onset afib prior to transfer  - Will resume metoprolol and amlodipine as patient is mildly hypertensive  - Resume hydrochlorothiazide as needed        VTE Risk Mitigation (From admission, onward)         Ordered     apixaban tablet 5 mg  2 times daily      02/13/20 0927     Place sequential compression device  Until discontinued      02/08/20 0233     IP VTE HIGH RISK PATIENT  Once      02/07/20 1915                      Chani Mckenzie MD  Department of Hospital Medicine   [facility]

## 2020-02-17 NOTE — SUBJECTIVE & OBJECTIVE
Interval History: NAEON. Pending SNF placement.    Review of Systems   Constitutional: Negative for chills and fever.   Cardiovascular: Negative for chest pain.   Gastrointestinal: Negative for abdominal pain, constipation and vomiting.     Objective:     Vital Signs (Most Recent):  Temp: 96.1 °F (35.6 °C) (02/17/20 1114)  Pulse: 71 (02/17/20 1114)  Resp: 18 (02/17/20 0743)  BP: 122/62 (02/17/20 1114)  SpO2: (!) 92 % (02/17/20 1114) Vital Signs (24h Range):  Temp:  [96.1 °F (35.6 °C)-98.7 °F (37.1 °C)] 96.1 °F (35.6 °C)  Pulse:  [71-87] 71  Resp:  [16-18] 18  SpO2:  [92 %-97 %] 92 %  BP: (108-141)/(62-72) 122/62     Weight: 77.6 kg (171 lb 1.2 oz)  Body mass index is 28.47 kg/m².    Intake/Output Summary (Last 24 hours) at 2/17/2020 1348  Last data filed at 2/17/2020 0900  Gross per 24 hour   Intake --   Output 1100 ml   Net -1100 ml      Physical Exam   Constitutional: She appears well-developed and well-nourished.   HENT:   Head: Normocephalic and atraumatic.   Eyes: Pupils are equal, round, and reactive to light. EOM are normal.   Neck: Normal range of motion. No tracheal deviation present.   Cardiovascular: Normal heart sounds.   No murmur heard.  Pulmonary/Chest: Effort normal. She has no wheezes. She has no rales.   Abdominal: Soft. Bowel sounds are normal. There is no tenderness.   Percutaneous drain   Musculoskeletal: Normal range of motion. She exhibits no edema.   Neurological: She is alert.   Skin: Skin is warm and dry.       Significant Labs: All pertinent labs within the past 24 hours have been reviewed.    Significant Imaging: I have reviewed all pertinent imaging results/findings within the past 24 hours.

## 2020-02-17 NOTE — PT/OT/SLP PROGRESS
"Speech Language Pathology Treatment/  Discharge    Patient Name:  Jeni Chacon   MRN:  [id number]  Admitting Diagnosis: Abscess of sigmoid colon    Recommendations:     Diet recommendations:  Dental Soft, Liquid Diet Level: Thin   Aspiration Precautions:   · NO straws  · PO meds crushed in pureed  · Fully awake and alert for PO intake  · Fully upright position for PO intake  · Small bites/ sips  · Slow rate of eating/ drinking  · 1 bite/ sip @ a time  · Refrain from talking prior to swallow completion   General Precautions: Standard, aspiration, fall, dental soft  Subjective     "It's fine" (pt stated regarding her swallowing/tolerance of diet)    Pain/Comfort:  · Pain Rating 1: 0/10  · Pain Rating Post-Intervention 1: 0/10    Objective:     Has the patient been evaluated by SLP for swallowing?   Yes  Keep patient NPO? No   Current Respiratory Status: nasal cannula      Pt seen this am with nursing present. Nursing administered meds crushed in puree with no s/s difficulty.  Dental soft breakfast tray was present.  Pt accepted oatmeal, scrambled eggs, chopped sausage, container of applesauce and approximately 5 oz thin liquid via cup.  Oral phase was c/b increased time for mastication of solids.  Initiation of the pharyngeal phase was not significantly delayed and laryngeal elevation appeared functional.  No overt s/s of aspiration were observed across consistencies.  Some fatigue/shortness of breath is noted; however, this appears pt's baseline and does not appear to impact swallow safety per pt performance this date.  Pt ind'ly took small breaks.  Discussed diet options (dental soft vs puree).  Pt verbalized desire to remain on Dental Soft with understanding that increased effort and rest breaks may be required.  ST provided education regarding ST role, current diet, swallow precautions, s/s aspiration to monitor for, and ST poc.  Results of session/recommendations reviewed with nursing.     Assessment:     Jeni" Oswaldo is a 74 y.o. female with an SLP diagnosis of improving Dysphagia.  She presents with no overt s/s of aspiration with po intake this date. Nursing reports good tolerance of observed intake as well.      Goals:   Multidisciplinary Problems     SLP Goals     Not on file          Multidisciplinary Problems (Resolved)        Problem: SLP Goal    Goal Priority Disciplines Outcome   SLP Goal   (Resolved)     SLP Met   Description:  Speech Language Pathology   Goals expected to be met by 2/18  1.  Pt will tolerate dental soft solid diet and thin liquids without overt clinical signs aspiration. - goal met.                         Plan:     · Plan of Care reviewed with:  patient   · SLP Follow-Up:  No       Discharge recommendations:  nursing facility, skilled   No further skilled ST recommended at this time as pt appears to be tolerating the Dental Soft Diet.  Please monitor for any change and re-consult ST at any time if warranted.      Time Tracking:     SLP Treatment Date:   02/17/20  Speech Start Time:  0857  Speech Stop Time:  0915     Speech Total Time (min):  18 min    Billable Minutes: Treatment Swallowing Dysfunction 10 and Seld Care/Home Management Training 8    NY Hernandez, CCC-SLP  Speech Language Pathologist  [phone]  2/17/2020

## 2020-02-17 NOTE — PT/OT/SLP PROGRESS
Physical Therapy      Patient Name:  Jeni Chacon   MRN:  [id number]    Patient not seen today secondary to (pt refused stating that she was cold. PT verbally instructed pt in importance of therapy session and possible complicationis from immobility. pt cont to refuse. ). Will follow-up at a later date.    Missy Chaudhary, PT   2/17/2020

## 2020-02-17 NOTE — PLAN OF CARE
02/17/20 1347   Post-Acute Status   Post-Acute Authorization Placement   Post-Acute Placement Status Pending Post-Acute Clinical Review   SW outreached [facility] ([phone]) to follow up on patient referral. Spoke with Yahaira in admissions, who reports will speak with DON to determine bed availability. Yahaira reports will call DINO back with update.   Liberty Douglas, BRIGETTE  [facility]

## 2020-02-17 NOTE — PT/OT/SLP PROGRESS
Occupational Therapy   Treatment    Name: Jeni Chacon  MRN: [id number]  Admitting Diagnosis:  Abscess of sigmoid colon       Recommendations:     Discharge Recommendations: nursing facility, skilled  Discharge Equipment Recommendations:  (TBD pending progress)  Barriers to discharge:  (increased level of assistance at this time. )    Assessment:     Jeni Chacon is a 74 y.o. female with a medical diagnosis of Abscess of sigmoid colon.  Performance deficits affecting function are weakness, impaired endurance, impaired self care skills, impaired functional mobilty, gait instability, impaired balance, decreased lower extremity function, impaired cognition, decreased safety awareness, decreased coordination, impaired coordination, impaired cardiopulmonary response to activity, decreased upper extremity function.     Rehab Prognosis:  Good; patient would benefit from acute skilled OT services to address these deficits and reach maximum level of function.       Plan:     Patient to be seen 3 x/week to address the above listed problems via self-care/home management, therapeutic activities, therapeutic exercises, cognitive retraining, neuromuscular re-education  · Plan of Care Expires: 03/09/20  · Plan of Care Reviewed with: patient    Subjective     Pain/Comfort:  · Pain Rating 1: 0/10  · Pain Rating Post-Intervention 1: 0/10    Objective:     Communicated with: NSG prior to session.  Patient found HOB elevated with telemetry, peripheral IV, PureWick(ostomy bag) upon OT entry to room.    General Precautions: Standard, aspiration, fall, dental soft   Orthopedic Precautions:N/A   Braces:  N/A    Occupational Performance:     Bed Mobility:    · Patient completed Scooting/Bridging with total assistance and 2 persons  · Patient completed Supine to Sit with minimum assistance  · Patient completed Sit to Supine with minimum assistance     Functional Mobility/Transfers:  · Patient completed Sit <> Stand Transfer with minimal  assistance  with  rolling walker (2x). Patient was unable to sidestep to HOB.    Activities of Daily Living:  · Grooming: stand by assistance oral care sitting EOB with S for balance  · Lower Body Dressing: total assistance Patient is unable to doff and don socks  · Toileting: total assistance Patient has PureWik      AMPAC 6 Click ADL: 15    Treatment & Education:  Role of OT and POC  Safety  ADL retraining  Functional mobility    Patient left HOB elevated with call button in reach, bed alarm on, nursing aware and all needs met. (AVASYS camera on with e-sitter notified (patient in view)Education:  ).    GOALS:   Multidisciplinary Problems     Occupational Therapy Goals        Problem: Occupational Therapy Goal    Goal Priority Disciplines Outcome Interventions   Occupational Therapy Goal     OT, PT/OT Ongoing, Progressing    Description:  Pt will complete GH tasks with stand by assistance Met  Pt will perform bed mobility with stand by assist  Pt will complete UB dressing with contact guard assist  Pt will follow simple commands 2/3 trials for GH task  Pt will sit edge of bed for >10 minutes for functional task with contact guard assist Met                     Time Tracking:     OT Date of Treatment: 02/17/20  OT Start Time: 1155  OT Stop Time: 1212  OT Total Time (min): 17 min    Billable Minutes:Self Care/Home Management 17    NADEEN Hughes  2/17/2020

## 2020-02-17 NOTE — PLAN OF CARE
Pt remains free from falls and injury. Pt makes no statement of pain. Pt oriented to name only. Provided with repositioning assistance. Remains on tele-sitter. Bed low and locked, Call light within reach.

## 2020-02-17 NOTE — PLAN OF CARE
02/17/20 0903   Post-Acute Status   Post-Acute Authorization Placement   Post-Acute Placement Status Referrals Sent

## 2020-02-17 NOTE — NURSING
Spoke with Dr. Mckenzie, who stated he will call patient's daughter tomorrow morning.  Contact information on yellow sticky noted in Epic.  Relayed information to Jeannine (daughter).

## 2020-02-17 NOTE — PLAN OF CARE
Problem: Occupational Therapy Goal  Goal: Occupational Therapy Goal  Description  Pt will complete GH tasks with stand by assistance Met  Pt will perform bed mobility with stand by assist  Pt will complete UB dressing with contact guard assist  Pt will follow simple commands 2/3 trials for GH task  Pt will sit edge of bed for >10 minutes for functional task with contact guard assist Met    Outcome: Ongoing, Progressing   Patient's goals are appropriate.  NADEEN Hughes  2/17/2020

## 2020-02-17 NOTE — PLAN OF CARE
Problem: SLP Goal  Goal: SLP Goal  Description  Speech Language Pathology   Goals expected to be met by 2/18  1.  Pt will tolerate dental soft solid diet and thin liquids without overt clinical signs aspiration. - goal met.        Outcome: Met    Given increased time, pt appears to be tolerating current diet with no overt s/s of aspiration.  See session note for further details/recommendations.     NY Hernandez, CCC-SLP  Speech Language Pathologist  [phone]

## 2020-02-18 LAB
POCT GLUCOSE: 171 MG/DL (ref 70–110)
POCT GLUCOSE: 187 MG/DL (ref 70–110)
POCT GLUCOSE: 198 MG/DL (ref 70–110)
POCT GLUCOSE: 225 MG/DL (ref 70–110)

## 2020-02-18 PROCEDURE — 99232 SBSQ HOSP IP/OBS MODERATE 35: CPT | Mod: ,,, | Performed by: HOSPITALIST

## 2020-02-18 PROCEDURE — 63600175 PHARM REV CODE 636 W HCPCS: Performed by: INTERNAL MEDICINE

## 2020-02-18 PROCEDURE — 94761 N-INVAS EAR/PLS OXIMETRY MLT: CPT

## 2020-02-18 PROCEDURE — 11000001 HC ACUTE MED/SURG PRIVATE ROOM

## 2020-02-18 PROCEDURE — 25000003 PHARM REV CODE 250: Performed by: STUDENT IN AN ORGANIZED HEALTH CARE EDUCATION/TRAINING PROGRAM

## 2020-02-18 PROCEDURE — 27000221 HC OXYGEN, UP TO 24 HOURS

## 2020-02-18 PROCEDURE — 99232 PR SUBSEQUENT HOSPITAL CARE,LEVL II: ICD-10-PCS | Mod: ,,, | Performed by: HOSPITALIST

## 2020-02-18 PROCEDURE — 25000003 PHARM REV CODE 250: Performed by: INTERNAL MEDICINE

## 2020-02-18 RX ADMIN — INSULIN ASPART 2 UNITS: 100 INJECTION, SOLUTION INTRAVENOUS; SUBCUTANEOUS at 06:02

## 2020-02-18 RX ADMIN — LEVOTHYROXINE SODIUM 200 MCG: 100 TABLET ORAL at 05:02

## 2020-02-18 RX ADMIN — APIXABAN 5 MG: 5 TABLET, FILM COATED ORAL at 09:02

## 2020-02-18 RX ADMIN — CIPROFLOXACIN HYDROCHLORIDE 500 MG: 500 TABLET, FILM COATED ORAL at 09:02

## 2020-02-18 RX ADMIN — VANCOMYCIN HYDROCHLORIDE 1250 MG: 1.25 INJECTION, POWDER, LYOPHILIZED, FOR SOLUTION INTRAVENOUS at 05:02

## 2020-02-18 RX ADMIN — METRONIDAZOLE 500 MG: 500 TABLET ORAL at 05:02

## 2020-02-18 RX ADMIN — AMLODIPINE BESYLATE 10 MG: 10 TABLET ORAL at 09:02

## 2020-02-18 RX ADMIN — DOCUSATE SODIUM - SENNOSIDES 1 TABLET: 50; 8.6 TABLET, FILM COATED ORAL at 09:02

## 2020-02-18 RX ADMIN — INSULIN ASPART 4 UNITS: 100 INJECTION, SOLUTION INTRAVENOUS; SUBCUTANEOUS at 06:02

## 2020-02-18 RX ADMIN — METOPROLOL SUCCINATE 100 MG: 100 TABLET, EXTENDED RELEASE ORAL at 09:02

## 2020-02-18 RX ADMIN — INSULIN DETEMIR 5 UNITS: 100 INJECTION, SOLUTION SUBCUTANEOUS at 09:02

## 2020-02-18 RX ADMIN — POLYETHYLENE GLYCOL 3350 17 G: 17 POWDER, FOR SOLUTION ORAL at 09:02

## 2020-02-18 RX ADMIN — METRONIDAZOLE 500 MG: 500 TABLET ORAL at 09:02

## 2020-02-18 RX ADMIN — INSULIN ASPART 4 UNITS: 100 INJECTION, SOLUTION INTRAVENOUS; SUBCUTANEOUS at 09:02

## 2020-02-18 NOTE — PLAN OF CARE
Pt remains free from falls and injury. Pt makes no statement of pain. Remains on tele-sitter. Bed low and locked, Call light within reach.

## 2020-02-18 NOTE — SUBJECTIVE & OBJECTIVE
Interval History: NAEON. Medically stable and pending SNF placement.     Review of Systems   Constitutional: Negative for chills and fever.   Cardiovascular: Negative for chest pain.   Gastrointestinal: Negative for abdominal pain, constipation and vomiting.     Objective:     Vital Signs (Most Recent):  Temp: 98.6 °F (37 °C) (02/18/20 1237)  Pulse: 76 (02/18/20 1237)  Resp: 14 (02/18/20 1237)  BP: 121/67 (02/18/20 1237)  SpO2: (!) 94 % (02/18/20 1237) Vital Signs (24h Range):  Temp:  [96.9 °F (36.1 °C)-99.2 °F (37.3 °C)] 98.6 °F (37 °C)  Pulse:  [72-91] 76  Resp:  [14-18] 14  SpO2:  [92 %-97 %] 94 %  BP: (121-145)/(63-70) 121/67     Weight: 77.6 kg (171 lb 1.2 oz)  Body mass index is 28.47 kg/m².    Intake/Output Summary (Last 24 hours) at 2/18/2020 1352  Last data filed at 2/18/2020 0500  Gross per 24 hour   Intake --   Output 350 ml   Net -350 ml      Physical Exam   Constitutional: She appears well-developed and well-nourished.   HENT:   Head: Normocephalic and atraumatic.   Eyes: Pupils are equal, round, and reactive to light. EOM are normal.   Neck: Normal range of motion. No tracheal deviation present.   Cardiovascular: Normal heart sounds.   No murmur heard.  Pulmonary/Chest: Effort normal. She has no wheezes. She has no rales.   Abdominal: Soft. Bowel sounds are normal. There is no tenderness.   Percutaneous drain   Musculoskeletal: Normal range of motion. She exhibits no edema.   Neurological: She is alert.   Skin: Skin is warm and dry.       Significant Labs: All pertinent labs within the past 24 hours have been reviewed.    Significant Imaging: I have reviewed all pertinent imaging results/findings within the past 24 hours.

## 2020-02-18 NOTE — PLAN OF CARE
02/18/20 1058   Post-Acute Status   Post-Acute Authorization Placement   Post-Acute Placement Status Insurance Denial   Sw covering for this patient today. According to CM and patient notes, patient payor denied LTAC for post acute care. SW will send additional referrals to SNF providers.   Liberty Douglas, BRIGETTE  [facility]

## 2020-02-18 NOTE — PROGRESS NOTES
[facility] Hospital Medicine  Progress Note    Patient Name: Jeni Chacon  MRN: [id number]  Patient Class: IP- Inpatient   Admission Date: 2/7/2020  Length of Stay: 11 days  Attending Physician: Juan Barbosa MD  Primary Care Provider: Primary Doctor [facility] Medicine Team: [facility] HOSP MED 2 Chani Mckenzie MD    Subjective:     Principal Problem:Abscess of sigmoid colon        HPI:  Patient is a 74 year old female with medical history significant for hypothyroidism, HTN, DMII with neuropathy, tobacco abuse (with no diagnosis of COPD), and history of CVA s/p  shunt who initially presented to [place] with complaints of SOB and abdominal pain. Although patient is oriented to person, time, and place, she still is having some odd behavior. So I gathered her history from her, her medical chart, and from calling her daughter. She reports that late January she had been having abdominal pain with associated SOB for a few weeks; despite a heavy smoking history, family reports that she has never had SOB or a diagnosis of COPD. She reports no alleviating or aggravating factors, just that the pain was sharp and diffuse.   She was admitted on 1/19 and had a complicated hospital course. Briefly, patient underwent CT abdomen on 1/20 which showed evidence of cholecystitis as well as an sigmoid colon abscess. She had a percutaneous cholecystostomy tube placed on 1/24. The abscess was not drained as IR felt it was unsafe to do so. During her hospital course, her blood cultures were positive for MSSA and she had evidence of UTI. Her antibiotics were changed a few times, but she currently is on cipro and flagyl for intraabdominal infection prophylaxis and on vanc and rifampin for MSSA bacteremia. KESHIA showed inferobasal hypokinesis and EF of 55%; no vegetation noted.  In addition to this she had O2 saturation in the mid-80s. Initially she was placed on bipap and treated for COPD exacerbation; she  completed a course of steroids and got one dose of Lasix. However her respiratory status worsening and she was placed in MICU and required intubation from 1/20-1/31. She was Bipap dependent following extubation until 2/6 and is now on nasal cannula; of note, she did require pressors during MICU stay. On top of all this, patient also developed paroxysmal a fib. CHADSVASC elevated however she could not be anticoagulated due to gross hematuria requiring blood transfusion. Patient transferred for second IR opinion regarding drainage of sigmoid abscess and out of confusion and frustration from the multiple consultants at [place].     At the time of my exam, patient was tachycardic, tachypneic, and mildly hypertension. She was AAO x3, however denied all symptoms on review of systems despite appear to have increased work of breathing. She reports that this is her baseline breathing and that she is on 3 L of O2 at home, however she exhibits odd behavior like trying to squirm out of the bed, or being unable to sign blood consent because it does not look right. Of note, patient was transferred with PICC line, however per chart review, it appears that her bacteremia resulted from cultures on 1/20, and the first vanc trough was taken on 1/23. Follow up culture have been negative and patient reports that she got her PICC line more recently. In addition, patient was transferred with NG tube. She reports that she is able to swallow and that the last time she ate was yesterday, however per nursing hand off, patient was to get formal speech eval which was not done due to transfer.      Overview/Hospital Course:  Patient admitted with sigmoid abscess for IR drainage, evaluated by IR and per repeat imaging, size reduced and no intervention. Also came with a percutaneous cholecystostomy tube placed for acute cholecystitis.   She is also evaluated by ID and continuing Vancomycin, Ciprofloxacin and Flagyl. Speech evaluated, to  continue dysphagia diet.    Interval History: NAEON. Medically stable and pending SNF placement.     Review of Systems   Constitutional: Negative for chills and fever.   Cardiovascular: Negative for chest pain.   Gastrointestinal: Negative for abdominal pain, constipation and vomiting.     Objective:     Vital Signs (Most Recent):  Temp: 98.6 °F (37 °C) (02/18/20 1237)  Pulse: 76 (02/18/20 1237)  Resp: 14 (02/18/20 1237)  BP: 121/67 (02/18/20 1237)  SpO2: (!) 94 % (02/18/20 1237) Vital Signs (24h Range):  Temp:  [96.9 °F (36.1 °C)-99.2 °F (37.3 °C)] 98.6 °F (37 °C)  Pulse:  [72-91] 76  Resp:  [14-18] 14  SpO2:  [92 %-97 %] 94 %  BP: (121-145)/(63-70) 121/67     Weight: 77.6 kg (171 lb 1.2 oz)  Body mass index is 28.47 kg/m².    Intake/Output Summary (Last 24 hours) at 2/18/2020 1352  Last data filed at 2/18/2020 0500  Gross per 24 hour   Intake --   Output 350 ml   Net -350 ml      Physical Exam   Constitutional: She appears well-developed and well-nourished.   HENT:   Head: Normocephalic and atraumatic.   Eyes: Pupils are equal, round, and reactive to light. EOM are normal.   Neck: Normal range of motion. No tracheal deviation present.   Cardiovascular: Normal heart sounds.   No murmur heard.  Pulmonary/Chest: Effort normal. She has no wheezes. She has no rales.   Abdominal: Soft. Bowel sounds are normal. There is no tenderness.   Percutaneous drain   Musculoskeletal: Normal range of motion. She exhibits no edema.   Neurological: She is alert.   Skin: Skin is warm and dry.       Significant Labs: All pertinent labs within the past 24 hours have been reviewed.    Significant Imaging: I have reviewed all pertinent imaging results/findings within the past 24 hours.      Assessment/Plan:      * Abscess of sigmoid colon  - Patient with evidence of  an abscess of 3.5 x 5 x 5.3 cm insinuated between the sigmoid colon and the dome of the urinary bladder, possibly in the setting of sigmoid diverticulitis  - Not drained by  IR at the time as it was deemed unsafe to do so  - Family wanted transfer for IR second opinion.   - Evaluated by IR, decreased size of the sigmoid collection, intervention deferred for now  - Blood cultures x2 collected, per outside hospital records, blood cultures have been clear since 1/20 ( See MSSA bacteremia)  - Continue cipro flagyl for now, lactate wnl  -4 weeks IV ABX per ID, to be finished at LTAC      Hypokalemia  -replete prn      Dysphagia  -diet: pureed w/ nectar thick liquids        Paroxysmal A-fib  - Patient with reports of new onset paroxysmal afib during course of hospital stay  - Appears to be in sinus rhythm at the time of initial exam  - Continue metoprolol. Patient was getting 25 mg QID prior to transfer. Unclear if this was out of caution for hypotension or if increased frequency was needed for continued rate control. Will keep at this dose for now as patient may have received some doses prior to transfer. Consider switching to metoprolol succinate in the morning.   - Patient not anticoagulated at this time due to significant hematuria prior to transfer  * [id number] Score for Atrial Fibrillation Stroke Ris  RESULT SUMMARY:  7 points  Stroke risk was 11.2% per year in >90,000 patients (the [place] Atrial Fibrillation Cohort Study) and 15.7% risk of stroke/TIA/systemic embolism  INPUTS:  Age --> 1 = 65-74  Sex --> 1 = Female  CHF history --> 0 = No  Hypertension history --> 1 = Yes  Stroke/TIA/thromboembolism history --> 2 = Yes  Vascular disease history (prior MI, peripheral artery disease, or aortic plaque) --> 1 = Yes  Diabetes history --> 1 = Yes  -on apixaban 5mg BID          Hematuria  Blood loss anemia    - Patient with complicated hospital course including significant hematuria leading to blood loss anemia and need for blood transfusion.   - Hg stable at 8.5 for now. Patient has purewick, no signs of gross hematuria. INR 1.1  - type and screen ordered, blood consent signed  - Will  keep cbc at daily frequency for now. If Hg with significant drop in the morning, consider increasing frequency and transfuse for Hg<7  - in terms of anticoagulation (see paroxysmal afib), hold for now. If Hg remains stable, consider starting anticoagulation        COPD (chronic obstructive pulmonary disease)  Respiratory failure with hypoxia  Respiratory distress    - Patient treated for COPD exacerbation with full course of steroids and breathing treatments prior to transfer  - Patient does not have formal diagnosis of COPD but reports heavy smoking history at a pack a day for many years  - Patient also reports using 3 L O2 at home, but daughter is not sure. Unspecified chronicity of hypoxic respiratory failure  - Will continue breathing treatment q6 and incentive spirometry  - Will follow up vbg as patient has apparent increased work of breathing.  - Patient is not on any inhalers at home, she will likely need rescue inhaler and inhaled corticosteroid at minimum at discharge       MSSA bacteremia  - Patient with evidence of MSSA bacteremia on blood cultures taken on 1/20. First vanc trough drawn on 1/23  - Sensitivities show that staph aureus is sensitive to cefazolin and oxacillin. However patient is still on vanc with documented allergy to penicillins. However patient is unclear as to what reaction she gets and daughter is unclear as well  - In addition, it appears that patient would have received full course of antibiotic for bacteremia as no further blood cultures were positive for MSSA after 1/20 cultures  - Blood cultures repeated  - Evaluated by ID yola recs, continue Vancomycin  Off note  shunt was tapped by MANDEEP in OSH on 1/25 with negative studies  Discussed with NSGY, as patient does not have meningitis,  shunt is not the source of MSSA and if menigitis suspected to first evaluate with LP.         Discharge planning issues  Debility  Overweight    -Arranging d/c to LTAC      Cholecystitis  -  Ct scan done on 1/20 showed gallbladder to be markedly distended with sludge and stones, and with an indistinct wall. There was some pericholecystic inflammation in the posterior part. She had a percutaneous cholecystostomy tube placed on 1/24 for the acute cholecystitis  - Drain still in place  - Will continue cipro flagyl for intraabdominal prophylaxis as patient also has sigmoid colon abscess       Type 2 diabetes mellitus, without long-term current use of insulin  - HA1c 7.3 last October   - Repeat ordered  - Patient was on 6 units of insulin basal plus sliding scale prior to transfer, she does not use insulin at home  - Will keep sliding scale insulin for now. Calculate daily insulin needs, place on basal if greater glucose control is needed; no longer being treated with steroids      Hypothyroid  - Continue home levothyroxine.  - 2/7 thyroid labs show TSH elevated at 32, but T4 wnl of 1.05    Essential hypertension  - Patient takes amlodipine and hydrochlorothiazide at home  - metoprolol started for rate control for new onset afib prior to transfer  - Will resume metoprolol and amlodipine as patient is mildly hypertensive  - Resume hydrochlorothiazide as needed        VTE Risk Mitigation (From admission, onward)         Ordered     apixaban tablet 5 mg  2 times daily      02/13/20 0927     Place sequential compression device  Until discontinued      02/08/20 0233     IP VTE HIGH RISK PATIENT  Once      02/07/20 1915                      Chani Mckenzie MD  Department of Hospital Medicine   [facility]

## 2020-02-18 NOTE — PLAN OF CARE
02/18/20 1101   Post-Acute Status   Post-Acute Authorization Placement   Post-Acute Placement Status Referrals Sent   Sw covering for this patient.     DINO sent additional SNF referrals to the following providers: [facility], [facility], [facility], and [facility]. [facility] accepted [facility] but no bed availability until end of week. SW will providers for update on patient referral status.   Liberty Douglas LMSW [facility]     (11:01AM) DINO outreached patient daughter via phone to update on patient tx and discharge planning. Daughter reports will be at hospital within the hour. SW provided contact info for when she gets here and for future questions/concenrs. DINO will continue with dc planning process.  Liberty Douglas LMSW [facility]     (12:00PM) DINO attempted to call in LOCET to state.  states noone available to complete at this time and will have someone call back when available.   Liberty Douglas LMSW [facility]     (12:50PM) DINO called [facility] for LOCET. Unable to speak to anyone at this time. SW left contact info with .  reports will have someone call back when available.  Liberty Douglas LMSW [facility]    (1:33PM) DINO called in LOCET and faxed PASRR to state. Awaiting 142.    (1:51PM) DINO spoke with [place] from [facility] about patient referral. DINO faxed additional clinicals to provider via [facility]. [place] will follow up with DINO after reviewing.   Liberty Douglas LMSW [facility]     (2:50PM) DINO outreached [facility] to follow up on patient referral. Left voicemail with [facility] who will give to admit coordinator.  Liberty Douglas LMSW [facility]

## 2020-02-19 LAB
ANION GAP SERPL CALC-SCNC: 8 MMOL/L (ref 8–16)
BASOPHILS # BLD AUTO: 0.03 K/UL (ref 0–0.2)
BASOPHILS NFR BLD: 0.5 % (ref 0–1.9)
BUN SERPL-MCNC: 4 MG/DL (ref 8–23)
CALCIUM SERPL-MCNC: 8.1 MG/DL (ref 8.7–10.5)
CHLORIDE SERPL-SCNC: 104 MMOL/L (ref 95–110)
CO2 SERPL-SCNC: 24 MMOL/L (ref 23–29)
CREAT SERPL-MCNC: 0.8 MG/DL (ref 0.5–1.4)
DIFFERENTIAL METHOD: ABNORMAL
EOSINOPHIL # BLD AUTO: 0.2 K/UL (ref 0–0.5)
EOSINOPHIL NFR BLD: 3.7 % (ref 0–8)
ERYTHROCYTE [DISTWIDTH] IN BLOOD BY AUTOMATED COUNT: 19 % (ref 11.5–14.5)
EST. GFR  (AFRICAN AMERICAN): >60 ML/MIN/1.73 M^2
EST. GFR  (NON AFRICAN AMERICAN): >60 ML/MIN/1.73 M^2
GLUCOSE SERPL-MCNC: 150 MG/DL (ref 70–110)
HCT VFR BLD AUTO: 29.1 % (ref 37–48.5)
HGB BLD-MCNC: 9.1 G/DL (ref 12–16)
IMM GRANULOCYTES # BLD AUTO: 0.02 K/UL (ref 0–0.04)
IMM GRANULOCYTES NFR BLD AUTO: 0.4 % (ref 0–0.5)
LYMPHOCYTES # BLD AUTO: 1.2 K/UL (ref 1–4.8)
LYMPHOCYTES NFR BLD: 21 % (ref 18–48)
MCH RBC QN AUTO: 27.7 PG (ref 27–31)
MCHC RBC AUTO-ENTMCNC: 31.3 G/DL (ref 32–36)
MCV RBC AUTO: 88 FL (ref 82–98)
MONOCYTES # BLD AUTO: 0.8 K/UL (ref 0.3–1)
MONOCYTES NFR BLD: 13.3 % (ref 4–15)
NEUTROPHILS # BLD AUTO: 3.4 K/UL (ref 1.8–7.7)
NEUTROPHILS NFR BLD: 61.1 % (ref 38–73)
NRBC BLD-RTO: 0 /100 WBC
PLATELET # BLD AUTO: 473 K/UL (ref 150–350)
PMV BLD AUTO: 9 FL (ref 9.2–12.9)
POCT GLUCOSE: 181 MG/DL (ref 70–110)
POCT GLUCOSE: 185 MG/DL (ref 70–110)
POCT GLUCOSE: 252 MG/DL (ref 70–110)
POTASSIUM SERPL-SCNC: 3.4 MMOL/L (ref 3.5–5.1)
RBC # BLD AUTO: 3.29 M/UL (ref 4–5.4)
SODIUM SERPL-SCNC: 136 MMOL/L (ref 136–145)
WBC # BLD AUTO: 5.62 K/UL (ref 3.9–12.7)

## 2020-02-19 PROCEDURE — 36415 COLL VENOUS BLD VENIPUNCTURE: CPT

## 2020-02-19 PROCEDURE — 85025 COMPLETE CBC W/AUTO DIFF WBC: CPT

## 2020-02-19 PROCEDURE — 97112 NEUROMUSCULAR REEDUCATION: CPT

## 2020-02-19 PROCEDURE — 63600175 PHARM REV CODE 636 W HCPCS: Performed by: INTERNAL MEDICINE

## 2020-02-19 PROCEDURE — 97535 SELF CARE MNGMENT TRAINING: CPT

## 2020-02-19 PROCEDURE — 97530 THERAPEUTIC ACTIVITIES: CPT

## 2020-02-19 PROCEDURE — 25000003 PHARM REV CODE 250: Performed by: INTERNAL MEDICINE

## 2020-02-19 PROCEDURE — 94799 UNLISTED PULMONARY SVC/PX: CPT

## 2020-02-19 PROCEDURE — 25000003 PHARM REV CODE 250: Performed by: STUDENT IN AN ORGANIZED HEALTH CARE EDUCATION/TRAINING PROGRAM

## 2020-02-19 PROCEDURE — 99900035 HC TECH TIME PER 15 MIN (STAT)

## 2020-02-19 PROCEDURE — 80048 BASIC METABOLIC PNL TOTAL CA: CPT

## 2020-02-19 PROCEDURE — 99232 SBSQ HOSP IP/OBS MODERATE 35: CPT | Mod: ,,, | Performed by: HOSPITALIST

## 2020-02-19 PROCEDURE — 11000001 HC ACUTE MED/SURG PRIVATE ROOM

## 2020-02-19 PROCEDURE — 94761 N-INVAS EAR/PLS OXIMETRY MLT: CPT

## 2020-02-19 PROCEDURE — 99232 PR SUBSEQUENT HOSPITAL CARE,LEVL II: ICD-10-PCS | Mod: ,,, | Performed by: HOSPITALIST

## 2020-02-19 PROCEDURE — 97803 MED NUTRITION INDIV SUBSEQ: CPT

## 2020-02-19 RX ORDER — POTASSIUM CHLORIDE 20 MEQ/1
40 TABLET, EXTENDED RELEASE ORAL
Status: COMPLETED | OUTPATIENT
Start: 2020-02-19 | End: 2020-02-19

## 2020-02-19 RX ADMIN — METOPROLOL SUCCINATE 100 MG: 100 TABLET, EXTENDED RELEASE ORAL at 09:02

## 2020-02-19 RX ADMIN — POTASSIUM CHLORIDE 40 MEQ: 1500 TABLET, EXTENDED RELEASE ORAL at 12:02

## 2020-02-19 RX ADMIN — POTASSIUM CHLORIDE 40 MEQ: 1500 TABLET, EXTENDED RELEASE ORAL at 10:02

## 2020-02-19 RX ADMIN — APIXABAN 5 MG: 5 TABLET, FILM COATED ORAL at 09:02

## 2020-02-19 RX ADMIN — APIXABAN 5 MG: 5 TABLET, FILM COATED ORAL at 10:02

## 2020-02-19 RX ADMIN — METRONIDAZOLE 500 MG: 500 TABLET ORAL at 10:02

## 2020-02-19 RX ADMIN — DOCUSATE SODIUM - SENNOSIDES 1 TABLET: 50; 8.6 TABLET, FILM COATED ORAL at 09:02

## 2020-02-19 RX ADMIN — INSULIN ASPART 3 UNITS: 100 INJECTION, SOLUTION INTRAVENOUS; SUBCUTANEOUS at 05:02

## 2020-02-19 RX ADMIN — CIPROFLOXACIN HYDROCHLORIDE 500 MG: 500 TABLET, FILM COATED ORAL at 09:02

## 2020-02-19 RX ADMIN — INSULIN DETEMIR 5 UNITS: 100 INJECTION, SOLUTION SUBCUTANEOUS at 10:02

## 2020-02-19 RX ADMIN — INSULIN ASPART 4 UNITS: 100 INJECTION, SOLUTION INTRAVENOUS; SUBCUTANEOUS at 09:02

## 2020-02-19 RX ADMIN — INSULIN ASPART 4 UNITS: 100 INJECTION, SOLUTION INTRAVENOUS; SUBCUTANEOUS at 11:02

## 2020-02-19 RX ADMIN — CIPROFLOXACIN HYDROCHLORIDE 500 MG: 500 TABLET, FILM COATED ORAL at 10:02

## 2020-02-19 RX ADMIN — METRONIDAZOLE 500 MG: 500 TABLET ORAL at 03:02

## 2020-02-19 RX ADMIN — INSULIN ASPART 4 UNITS: 100 INJECTION, SOLUTION INTRAVENOUS; SUBCUTANEOUS at 04:02

## 2020-02-19 RX ADMIN — LEVOTHYROXINE SODIUM 200 MCG: 100 TABLET ORAL at 06:02

## 2020-02-19 RX ADMIN — AMLODIPINE BESYLATE 10 MG: 10 TABLET ORAL at 09:02

## 2020-02-19 RX ADMIN — VANCOMYCIN HYDROCHLORIDE 1250 MG: 1.25 INJECTION, POWDER, LYOPHILIZED, FOR SOLUTION INTRAVENOUS at 04:02

## 2020-02-19 RX ADMIN — METRONIDAZOLE 500 MG: 500 TABLET ORAL at 06:02

## 2020-02-19 NOTE — ASSESSMENT & PLAN NOTE
- Patient with evidence of  an abscess of 3.5 x 5 x 5.3 cm insinuated between the sigmoid colon and the dome of the urinary bladder, possibly in the setting of sigmoid diverticulitis  - Not drained by IR at the time as it was deemed unsafe to do so  - Family wanted transfer for IR second opinion.   - Evaluated by IR, decreased size of the sigmoid collection, intervention deferred for now  - Blood cultures x2 collected, per outside hospital records, blood cultures have been clear since 1/20 ( See MSSA bacteremia)  - Continue Cipro and flagyl for 2 weeks ( End date 2/20), repeat CT AP on 2/20 and discuss with ID for further need of antibiotics

## 2020-02-19 NOTE — SUBJECTIVE & OBJECTIVE
Interval History:   Has no complaints. Pending SNF    Review of Systems   Constitutional: Negative for chills and fever.   Cardiovascular: Negative for chest pain.   Gastrointestinal: Negative for abdominal pain, constipation and vomiting.     Objective:     Vital Signs (Most Recent):  Temp: 98.5 °F (36.9 °C) (02/19/20 1147)  Pulse: 83 (02/19/20 1230)  Resp: 16 (02/19/20 1230)  BP: 113/65 (02/19/20 1147)  SpO2: 97 % (02/19/20 1230) Vital Signs (24h Range):  Temp:  [97.3 °F (36.3 °C)-99.1 °F (37.3 °C)] 98.5 °F (36.9 °C)  Pulse:  [79-89] 83  Resp:  [16-20] 16  SpO2:  [91 %-98 %] 97 %  BP: (113-141)/(65-79) 113/65     Weight: 77.6 kg (171 lb 1.2 oz)  Body mass index is 28.47 kg/m².    Intake/Output Summary (Last 24 hours) at 2/19/2020 1335  Last data filed at 2/19/2020 0646  Gross per 24 hour   Intake 360 ml   Output 1500 ml   Net -1140 ml      Physical Exam   Constitutional: She appears well-developed and well-nourished.   HENT:   Head: Normocephalic and atraumatic.   Eyes: Pupils are equal, round, and reactive to light. EOM are normal.   Neck: Normal range of motion. No tracheal deviation present.   Cardiovascular: Normal heart sounds.   No murmur heard.  Pulmonary/Chest: Effort normal. She has no wheezes. She has no rales.   Abdominal: Soft. Bowel sounds are normal. There is no tenderness.   Percutaneous drain   Musculoskeletal: Normal range of motion. She exhibits no edema.   Neurological: She is alert.   Skin: Skin is warm and dry.       Significant Labs: All pertinent labs within the past 24 hours have been reviewed.    Significant Imaging: I have reviewed all pertinent imaging results/findings within the past 24 hours.

## 2020-02-19 NOTE — PLAN OF CARE
02/19/20 1529   Post-Acute Status   Post-Acute Authorization Placement   Post-Acute Placement Status Referrals Sent

## 2020-02-19 NOTE — PT/OT/SLP PROGRESS
Physical Therapy Treatment    Patient Name:  Jeni Chacon   MRN:  [id number]    Recommendations:     Discharge Recommendations:  nursing facility, skilled   Discharge Equipment Recommendations: other (see comments)(TBD)   Barriers to discharge: Inaccessible home and Decreased caregiver support    Assessment:     Jeni Chacon is a 74 y.o. female admitted with a medical diagnosis of Abscess of sigmoid colon.  She presents with the following impairments/functional limitations:  weakness, impaired functional mobilty, decreased safety awareness, impaired cognition, impaired cardiopulmonary response to activity, impaired endurance, gait instability, decreased coordination, impaired fine motor, impaired balance, decreased upper extremity function, decreased lower extremity function, impaired self care skills. Patient tolerated session well. She is pleasant throughout. Patient would continue to benefit from skilled PT services while in the hospital. At this time,upon d/c she is an appropriate candidate for SNF in order to progress towards an improved level of functional mobility independence.    Rehab Prognosis: Good; patient would benefit from acute skilled PT services to address these deficits and reach maximum level of function.    Recent Surgery: * No surgery found *      Plan:     During this hospitalization, patient to be seen 3 x/week to address the identified rehab impairments via gait training, therapeutic activities, therapeutic exercises, neuromuscular re-education and progress toward the following goals:    · Plan of Care Expires:  03/08/20    Subjective     Chief Complaint: none  Patient/Family Comments/goals: to get stronger  Pain/Comfort:  · Pain Rating 1: 0/10  · Pain Rating Post-Intervention 1: 0/10      Objective:     Communicated with RN prior to session.  Patient found supine with telemetry, peripheral IV, PureWick, oxygen(ostomy bag) upon PT entry to room.     General Precautions: Standard, aspiration,  fall, dental soft   Orthopedic Precautions:N/A   Braces: N/A     Functional Mobility:  · Bed Mobility:     · Rolling Left:  minimum assistance  · Scooting: minimum assistance  · Supine to Sit: minimum assistance  · Sit to Supine: minimum assistance  · Transfers:     · Sit to Stand:  minimum assistance with rolling walker  · Gait: ~4 side steps with ModA and RW  · FFP, verbal and tacile cues to improve safe and proper mgt and gait sequence, decreased L foot clearance  · patient SOB and fatigued upon completion     Sitting EOB ; BLE exercises x10 each performed: hip flexion, LAQ    AM-PAC 6 CLICK MOBILITY  Turning over in bed (including adjusting bedclothes, sheets and blankets)?: 3  Sitting down on and standing up from a chair with arms (e.g., wheelchair, bedside commode, etc.): 3  Moving from lying on back to sitting on the side of the bed?: 3  Moving to and from a bed to a chair (including a wheelchair)?: 2  Need to walk in hospital room?: 2  Climbing 3-5 steps with a railing?: 2  Basic Mobility Total Score: 15       Therapeutic Activities and Exercises:  -Patient educated on the continued role and goal of PT  -Questions and concerns answered within the the PT scope of practice.   -White board updated in patient room to reflect level of assistance needed with nursing.     Patient left supine with all lines intact, call button in reach, bed alarm on, RN notified and [employer]  present..    GOALS:   Multidisciplinary Problems     Physical Therapy Goals        Problem: Physical Therapy Goal    Goal Priority Disciplines Outcome Goal Variances Interventions   Physical Therapy Goal     PT, PT/OT Ongoing, Progressing     Description:  Goals to be met by: 3/8/2020    Patient will increase functional independence with mobility by performin. Supine to sit with MInimal Assistance - MET       Supine to sit with CGA  2. Sit to supine with MInimal Assistance - MET       Supine to sit with CGA   3. Sit to stand transfer with  Moderate Assistance - MET       Sit to stand transfer with CGA   4. Gait  x 15 feet with Minimal Assistance using LRAD  5. Lower extremity exercise program x15 reps per handout, with independence                      Time Tracking:     PT Received On: 02/19/20  PT Start Time: 1131     PT Stop Time: 1202  PT Total Time (min): 31 min     Billable Minutes: Therapeutic Activity 16 and Neuromuscular Re-education 15    Treatment Type: Treatment  PT/PTA: PT     PTA Visit Number: 0     Elisa Cross, PT  02/19/2020

## 2020-02-19 NOTE — PROGRESS NOTES
[facility] Hospital Medicine  Progress Note    Patient Name: Jeni Chacon  MRN: [id number]  Patient Class: IP- Inpatient   Admission Date: 2/7/2020  Length of Stay: 12 days  Attending Physician: Juan Barbosa MD  Primary Care Provider: [facility] Medicine Team: [facility] MED 2 Ac Lou MD    Subjective:     Principal Problem:Abscess of sigmoid colon        HPI:  Patient is a 74 year old female with medical history significant for hypothyroidism, HTN, DMII with neuropathy, tobacco abuse (with no diagnosis of COPD), and history of CVA s/p  shunt who initially presented to [facility] with complaints of SOB and abdominal pain. Although patient is oriented to person, time, and place, she still is having some odd behavior. So I gathered her history from her, her medical chart, and from calling her daughter. She reports that late January she had been having abdominal pain with associated SOB for a few weeks; despite a heavy smoking history, family reports that she has never had SOB or a diagnosis of COPD. She reports no alleviating or aggravating factors, just that the pain was sharp and diffuse.   She was admitted on 1/19 and had a complicated hospital course. Briefly, patient underwent CT abdomen on 1/20 which showed evidence of cholecystitis as well as an sigmoid colon abscess. She had a percutaneous cholecystostomy tube placed on 1/24. The abscess was not drained as IR felt it was unsafe to do so. During her hospital course, her blood cultures were positive for MSSA and she had evidence of UTI. Her antibiotics were changed a few times, but she currently is on cipro and flagyl for intraabdominal infection prophylaxis and on vanc and rifampin for MSSA bacteremia. KESHIA showed inferobasal hypokinesis and EF of 55%; no vegetation noted.  In addition to this she had O2 saturation in the mid-80s. Initially she was placed on bipap and treated for COPD exacerbation;  she completed a course of steroids and got one dose of Lasix. However her respiratory status worsening and she was placed in MICU and required intubation from 1/20-1/31. She was Bipap dependent following extubation until 2/6 and is now on nasal cannula; of note, she did require pressors during MICU stay. On top of all this, patient also developed paroxysmal a fib. CHADSVASC elevated however she could not be anticoagulated due to gross hematuria requiring blood transfusion. Patient transferred for second IR opinion regarding drainage of sigmoid abscess and out of confusion and frustration from the multiple consultants at [facility].     At the time of my exam, patient was tachycardic, tachypneic, and mildly hypertension. She was AAO x3, however denied all symptoms on review of systems despite appear to have increased work of breathing. She reports that this is her baseline breathing and that she is on 3 L of O2 at home, however she exhibits odd behavior like trying to squirm out of the bed, or being unable to sign blood consent because it does not look right. Of note, patient was transferred with PICC line, however per chart review, it appears that her bacteremia resulted from cultures on 1/20, and the first vanc trough was taken on 1/23. Follow up culture have been negative and patient reports that she got her PICC line more recently. In addition, patient was transferred with NG tube. She reports that she is able to swallow and that the last time she ate was yesterday, however per nursing hand off, patient was to get formal speech eval which was not done due to transfer.      Overview/Hospital Course:  Patient admitted with sigmoid abscess for IR drainage, evaluated by IR and per repeat imaging, size reduced and no intervention. Also came with a percutaneous cholecystostomy tube placed for acute cholecystitis.   She is also evaluated by ID and continuing Vancomycin, Ciprofloxacin and Flagyl for 2 weeks and  repeat CT AP (2/20). Speech evaluated, to continue dysphagia diet.    Interval History:   Has no complaints. Pending SNF    Review of Systems   Constitutional: Negative for chills and fever.   Cardiovascular: Negative for chest pain.   Gastrointestinal: Negative for abdominal pain, constipation and vomiting.     Objective:     Vital Signs (Most Recent):  Temp: 98.5 °F (36.9 °C) (02/19/20 1147)  Pulse: 83 (02/19/20 1230)  Resp: 16 (02/19/20 1230)  BP: 113/65 (02/19/20 1147)  SpO2: 97 % (02/19/20 1230) Vital Signs (24h Range):  Temp:  [97.3 °F (36.3 °C)-99.1 °F (37.3 °C)] 98.5 °F (36.9 °C)  Pulse:  [79-89] 83  Resp:  [16-20] 16  SpO2:  [91 %-98 %] 97 %  BP: (113-141)/(65-79) 113/65     Weight: 77.6 kg (171 lb 1.2 oz)  Body mass index is 28.47 kg/m².    Intake/Output Summary (Last 24 hours) at 2/19/2020 1335  Last data filed at 2/19/2020 0646  Gross per 24 hour   Intake 360 ml   Output 1500 ml   Net -1140 ml      Physical Exam   Constitutional: She appears well-developed and well-nourished.   HENT:   Head: Normocephalic and atraumatic.   Eyes: Pupils are equal, round, and reactive to light. EOM are normal.   Neck: Normal range of motion. No tracheal deviation present.   Cardiovascular: Normal heart sounds.   No murmur heard.  Pulmonary/Chest: Effort normal. She has no wheezes. She has no rales.   Abdominal: Soft. Bowel sounds are normal. There is no tenderness.   Percutaneous drain   Musculoskeletal: Normal range of motion. She exhibits no edema.   Neurological: She is alert.   Skin: Skin is warm and dry.       Significant Labs: All pertinent labs within the past 24 hours have been reviewed.    Significant Imaging: I have reviewed all pertinent imaging results/findings within the past 24 hours.      Assessment/Plan:      * Abscess of sigmoid colon  - Patient with evidence of  an abscess of 3.5 x 5 x 5.3 cm insinuated between the sigmoid colon and the dome of the urinary bladder, possibly in the setting of sigmoid  diverticulitis  - Not drained by IR at the time as it was deemed unsafe to do so  - Family wanted transfer for IR second opinion.   - Evaluated by IR, decreased size of the sigmoid collection, intervention deferred for now  - Blood cultures x2 collected, per outside hospital records, blood cultures have been clear since 1/20 ( See MSSA bacteremia)  - Continue Cipro and flagyl for 2 weeks ( End date 2/20), repeat CT AP on 2/20 and discuss with ID for further need of antibiotics      Hypokalemia  -replete prn      Dysphagia  - Dental soft per speech        Paroxysmal A-fib  - Patient with reports of new onset paroxysmal afib during course of hospital stay  - Appears to be in sinus rhythm at the time of initial exam  - Continue metoprolol. Patient was getting 25 mg QID prior to transfer. Unclear if this was out of caution for hypotension or if increased frequency was needed for continued rate control. Will keep at this dose for now as patient may have received some doses prior to transfer. Consider switching to metoprolol succinate in the morning.   - Patient not anticoagulated at this time due to significant hematuria prior to transfer  * [id number]-ETCp Score for Atrial Fibrillation Stroke Ris  RESULT SUMMARY:  7 points  Stroke risk was 11.2% per year in >90,000 patients (the [place] Atrial Fibrillation Cohort Study) and 15.7% risk of stroke/TIA/systemic embolism  INPUTS:  Age --> 1 = 65-74  Sex --> 1 = Female  CHF history --> 0 = No  Hypertension history --> 1 = Yes  Stroke/TIA/thromboembolism history --> 2 = Yes  Vascular disease history (prior MI, peripheral artery disease, or aortic plaque) --> 1 = Yes  Diabetes history --> 1 = Yes  -on apixaban 5mg BID          Hematuria  Blood loss anemia    - Patient with complicated hospital course including significant hematuria leading to blood loss anemia and need for blood transfusion.   - Hg stable at 8.5 for now. Patient has purewick, no signs of gross hematuria. INR  1.1  - type and screen ordered, blood consent signed  - Will keep cbc at daily frequency for now. If Hg with significant drop in the morning, consider increasing frequency and transfuse for Hg<7  - in terms of anticoagulation (see paroxysmal afib), hold for now. If Hg remains stable, consider starting anticoagulation        COPD (chronic obstructive pulmonary disease)  Respiratory failure with hypoxia  Respiratory distress    - Patient treated for COPD exacerbation with full course of steroids and breathing treatments prior to transfer  - Patient does not have formal diagnosis of COPD but reports heavy smoking history at a pack a day for many years  - Patient also reports using 3 L O2 at home, but daughter is not sure. Unspecified chronicity of hypoxic respiratory failure  - Will continue breathing treatment q6 and incentive spirometry  - Patient is not on any inhalers at home, she will likely need rescue inhaler and inhaled corticosteroid at minimum at discharge       MSSA bacteremia  - Patient with evidence of MSSA bacteremia on blood cultures taken on 1/20. First vanc trough drawn on 1/23  - Sensitivities show that staph aureus is sensitive to cefazolin and oxacillin. However patient is still on vanc with documented allergy to penicillins. However patient is unclear as to what reaction she gets and daughter is unclear as well  - In addition, it appears that patient would have received full course of antibiotic for bacteremia as no further blood cultures were positive for MSSA after 1/20 cultures  - Blood cultures repeated  - Evaluated by ID yola recs, continue Vancomycin  Off note  shunt was tapped by MANDEEP in OSH on 1/25 with negative studies  Discussed with NSGY, as patient does not have meningitis,  shunt is not the source of MSSA and if menigitis suspected to first evaluate with LP.         Discharge planning issues  - Medically ready to go to SNF      Cholecystitis  - Ct scan done on 1/20 showed  gallbladder to be markedly distended with sludge and stones, and with an indistinct wall. There was some pericholecystic inflammation in the posterior part. She had a percutaneous cholecystostomy tube placed on 1/24 for the acute cholecystitis  - Drain still in place  - Will continue cipro flagyl       Type 2 diabetes mellitus, without long-term current use of insulin  - HA1c 6.9    Diet - Dental soft/dysphagia    - Insulin Detemir 5 daily  - Insulin Aspart 4 TID  - SSI     POCT Glucose - Before meals and QHS  Goal BG - 140-180              Hypothyroid  - Continue home levothyroxine.  - 2/7 thyroid labs show TSH elevated at 32, but T4 wnl of 1.05    Essential hypertension  - Patient takes amlodipine and hydrochlorothiazide at home  - metoprolol started for rate control for new onset afib prior to transfer  - Will resume metoprolol and amlodipine as patient is mildly hypertensive  - Resume hydrochlorothiazide as needed        VTE Risk Mitigation (From admission, onward)         Ordered     apixaban tablet 5 mg  2 times daily      02/13/20 0927     Place sequential compression device  Until discontinued      02/08/20 0233     IP VTE HIGH RISK PATIENT  Once      02/07/20 1915                      Ac Lou MD  Department of Hospital Medicine   [facility]

## 2020-02-19 NOTE — ASSESSMENT & PLAN NOTE
- Ct scan done on 1/20 showed gallbladder to be markedly distended with sludge and stones, and with an indistinct wall. There was some pericholecystic inflammation in the posterior part. She had a percutaneous cholecystostomy tube placed on 1/24 for the acute cholecystitis  - Drain still in place  - Will continue cipro flagyl

## 2020-02-19 NOTE — PLAN OF CARE
Problem: Diabetes Comorbidity  Goal: Blood Glucose Level Within Desired Range  Outcome: Ongoing, Progressing     Problem: Infection  Goal: Infection Symptom Resolution  Outcome: Ongoing, Progressing     Problem: Fall Injury Risk  Goal: Absence of Fall and Fall-Related Injury  Outcome: Ongoing, Progressing     Problem: Skin Injury Risk Increased  Goal: Skin Health and Integrity  Outcome: Ongoing, Progressing

## 2020-02-19 NOTE — PLAN OF CARE
Problem: Occupational Therapy Goal  Goal: Occupational Therapy Goal  Description  Pt will complete GH tasks with stand by assistance Met  Pt will perform bed mobility with stand by assist  Pt will complete UB dressing with contact guard assist  Pt will follow simple commands 2/3 trials for GH task  Pt will sit edge of bed for >10 minutes for functional task with contact guard assist Met    Outcome: Ongoing, Progressing   Patient's goals are appropriate.   NADEEN Hughes  2/19/2020

## 2020-02-19 NOTE — PLAN OF CARE
Problem: Physical Therapy Goal  Goal: Physical Therapy Goal  Description  Goals to be met by: 3/8/2020    Patient will increase functional independence with mobility by performin. Supine to sit with MInimal Assistance - MET       Supine to sit with CGA  2. Sit to supine with MInimal Assistance - MET       Supine to sit with CGA   3. Sit to stand transfer with Moderate Assistance - MET       Sit to stand transfer with CGA   4. Gait  x 15 feet with Minimal Assistance using LRAD  5. Lower extremity exercise program x15 reps per handout, with independence     Outcome: Ongoing, Progressing   Patient tolerated treatment well. Established POC and goals reviewed and remain appropriate. Plan is to continue to improve patient's functional mobility capabilities.      Elisa Cross, PT, DPT  2020

## 2020-02-19 NOTE — PROGRESS NOTES
"[facility]  Adult Nutrition  Progress Note    SUMMARY       Recommendations    Recommendation:   1. Continue dysphagia soft diet, ADAT to DM diet texture per SLP   2. Add boost glucose to increase intake   3. Suggest MVI      RD to monitor and follow up     Goals: Pt to receive nutrition by RD follow-up.   Nutrition Goal Status: goal met  Communication of RD Recs: other (comment)(POC)    Reason for Assessment    Reason For Assessment: RD follow-up  Diagnosis: other (see comments)(abscess of sigmoid colon)  Relevant Medical History: HTN, DM, s/p  shunt  Interdisciplinary Rounds: did not attend  General Information Comments: Pt continues on dysphagia soft diet, tolerating well. PO ~100% of meals per pt. Bfst at bedside at time of visit, states she was not hungry yet this am. Pt seems slighlty confused about some nutrition hx. Continues to request boost plus, suggesting add ONS to increase intake. No c/o N/V/C/D reproted. Wt stable since admit, UBW ~120 lbs per pt. NFPE completed on 2/12/20 pt with no s/s of malnutrition at this time.   Nutrition Discharge Planning: adequate intake via PO diet     Nutrition Risk Screen    Nutrition Risk Screen: dysphagia or difficulty swallowing    Nutrition/Diet History    Spiritual, Cultural Beliefs, Jehovah's witness Practices, Values that Affect Care: no  Food Allergies: NKFA  Factors Affecting Nutritional Intake: None identified at this time    Anthropometrics    Temp: 98.5 °F (36.9 °C)  Height: 5' 5" (165.1 cm)  Height (inches): 65 in  Weight: 77.6 kg (171 lb 1.2 oz)  Weight (lb): 171.08 lb  Ideal Body Weight (IBW), Female: 125 lb  % Ideal Body Weight, Female (lb): 136.86 %  BMI (Calculated): 28.5  BMI Grade: 25 - 29.9 - overweight    Lab/Procedures/Meds    Pertinent Labs Reviewed: reviewed  Pertinent Labs Comments: K 3.4; BUN 4; Glucose 150; Ca 8.1  Pertinent Medications Reviewed: reviewed  Pertinent Medications Comments: vancomycin; insulin; metoprolol; " senna-docusate; ciprofloxacin       Estimated/Assessed Needs    Weight Used For Calorie Calculations: 77.6 kg (171 lb 1.2 oz)  Energy Calorie Requirements (kcal): 1607  Energy Need Method: [place]-St Jeor(1.25 PAL)  Protein Requirements: 77-93g (1-1.2g/kg)  Weight Used For Protein Calculations: 77.6 kg (171 lb 1.2 oz)  Fluid Requirements (mL): Per MD  RDA Method (mL): 1607    Nutrition Prescription Ordered    Current Diet Order: dysphagia soft, DM diet     Evaluation of Received Nutrient/Fluid Intake    I/O: -6.9 L since admit  Energy Calories Required: meeting needs  Protein Required: meeting needs  Fluid Required: meeting needs  Comments: LBM 2/18  Tolerance: tolerating  % Intake of Estimated Energy Needs: 75 - 100 %  % Meal Intake: 75 - 100 %    Nutrition Risk    Level of Risk/Frequency of Follow-up: high(2X/week)     Assessment and Plan    Nutrition Problem  Inadequate energy intake     Related to (etiology):   Decreased ability to consume adequate energy     Signs and Symptoms (as evidenced by):   NPO status, no form of nutrition at this time     Interventions(treatment strategy):  Collaboration of nutrition care with other providers     Nutrition Diagnosis Status:  Resolved     Monitor and Evaluation    Food and Nutrient Intake: energy intake  Food and Nutrient Adminstration: diet order  Anthropometric Measurements: weight, weight change  Biochemical Data, Medical Tests and Procedures: other (specify)(All labs)  Nutrition-Focused Physical Findings: overall appearance     Malnutrition Assessment    Orbital Region (Subcutaneous Fat Loss): well nourished  Upper Arm Region (Subcutaneous Fat Loss): well nourished   [place] Region (Muscle Loss): well nourished  Clavicle Bone Region (Muscle Loss): well nourished  Clavicle and Acromion Bone Region (Muscle Loss): well nourished  Dorsal Hand (Muscle Loss): well nourished  Patellar Region (Muscle Loss): well nourished  Anterior Thigh Region (Muscle Loss): well nourished      Nutrition Follow-Up    RD Follow-up?: Yes

## 2020-02-19 NOTE — PT/OT/SLP PROGRESS
Occupational Therapy   Treatment    Name: Jeni Chacon  MRN: [id number]  Admitting Diagnosis:  Abscess of sigmoid colon       Recommendations:     Discharge Recommendations: nursing facility, skilled  Discharge Equipment Recommendations:  (TBD pending progress)  Barriers to discharge:  (increased level of assistance at this time. )    Assessment:     Jeni Chacon is a 74 y.o. female with a medical diagnosis of Abscess of sigmoid colon. Performance deficits affecting function are weakness, impaired endurance, impaired self care skills, impaired functional mobilty, gait instability, impaired balance, decreased lower extremity function, impaired cognition, decreased safety awareness, decreased coordination, impaired coordination, impaired cardiopulmonary response to activity, decreased upper extremity function. Still recommending SNF to ensure safety, reduce burden of care, and maximize functional independence prior to discharge home.     Rehab Prognosis:  Good; patient would benefit from acute skilled OT services to address these deficits and reach maximum level of function.       Plan:     Patient to be seen 3 x/week to address the above listed problems via self-care/home management, therapeutic activities, therapeutic exercises, cognitive retraining, neuromuscular re-education  · Plan of Care Expires: 03/09/20  · Plan of Care Reviewed with: patient    Subjective     Pain/Comfort:  · Pain Rating 1: 0/10  · Pain Rating Post-Intervention 1: 0/10    Objective:     Communicated with: NSG prior to session.  Patient found supine with telemetry, peripheral IV, oxygen, PureWick(ostomy bag) upon OT entry to room.    General Precautions: Standard, aspiration, fall, dental soft   Orthopedic Precautions:N/A   Braces: N/A     Occupational Performance:     Bed Mobility:    · Patient completed Rolling/Turning to Left with  minimum assistance  · Patient completed Scooting/Bridging with minimum assistance  · Patient completed Supine  to Sit with minimum assistance  · Patient completed Sit to Supine with minimum assistance     Functional Mobility/Transfers:  · Patient completed Sit <> Stand Transfer with minimum assistance  with  rolling walker     Activities of Daily Living:  · Upper Body Dressing: minimum assistance [place] and donning front gown  · Lower Body Dressing: moderate assistance [place] and donning socks while sitting EOB  · Toileting: total assistance Patient had PureWik, but it was not in place. Patient was soiled and needed to be cleaned during OT session. New PureWik replaced at end of OT session.       [facility] 6 Click ADL: 16    Treatment & Education:  Role of OT and POC  Safety  ADL retraining  Functional mobility training    Patient performed B UE ROM exercises sitting EOB (L AROM; R AAROM) (chest press and front rows) 2 x 10 focusing to improve strength and endurance to increase independence with ADLs.     Patient left HOB elevated with call button in reach, nurse notified, AVASYS camera on and present and all needs met. Education:      GOALS:   Multidisciplinary Problems     Occupational Therapy Goals        Problem: Occupational Therapy Goal    Goal Priority Disciplines Outcome Interventions   Occupational Therapy Goal     OT, PT/OT Ongoing, Progressing    Description:  Pt will complete GH tasks with stand by assistance Met  Pt will perform bed mobility with stand by assist  Pt will complete UB dressing with contact guard assist  Pt will follow simple commands 2/3 trials for GH task  Pt will sit edge of bed for >10 minutes for functional task with contact guard assist Met                     Time Tracking:     OT Date of Treatment: 02/19/20  OT Start Time: 1131  OT Stop Time: 1203  OT Total Time (min): 32 min    Billable Minutes:Self Care/Home Management 32    NADEEN Hughes  2/19/2020

## 2020-02-19 NOTE — ASSESSMENT & PLAN NOTE
- HA1c 6.9    Diet - Dental soft/dysphagia    - Insulin Detemir 5 daily  - Insulin Aspart 4 TID  - SSI     POCT Glucose - Before meals and QHS  Goal BG - 140-180

## 2020-02-19 NOTE — PLAN OF CARE
Recommendations    Recommendation:   1. Continue dysphagia soft diet, ADAT to DM diet texture per SLP   2. Add boost glucose to increase intake   3. Suggest MVI      RD to monitor and follow up     Goals: Pt to receive nutrition by RD follow-up.   Nutrition Goal Status: goal met  Communication of RD Recs: other (comment)(POC)

## 2020-02-20 LAB
POCT GLUCOSE: 163 MG/DL (ref 70–110)
POCT GLUCOSE: 189 MG/DL (ref 70–110)
POCT GLUCOSE: 206 MG/DL (ref 70–110)
POCT GLUCOSE: 219 MG/DL (ref 70–110)
POCT GLUCOSE: 229 MG/DL (ref 70–110)

## 2020-02-20 PROCEDURE — 99233 SBSQ HOSP IP/OBS HIGH 50: CPT | Mod: ,,, | Performed by: PHYSICIAN ASSISTANT

## 2020-02-20 PROCEDURE — 25000003 PHARM REV CODE 250: Performed by: STUDENT IN AN ORGANIZED HEALTH CARE EDUCATION/TRAINING PROGRAM

## 2020-02-20 PROCEDURE — 99232 PR SUBSEQUENT HOSPITAL CARE,LEVL II: ICD-10-PCS | Mod: ,,, | Performed by: INTERNAL MEDICINE

## 2020-02-20 PROCEDURE — 11000001 HC ACUTE MED/SURG PRIVATE ROOM

## 2020-02-20 PROCEDURE — 63600175 PHARM REV CODE 636 W HCPCS: Performed by: HOSPITALIST

## 2020-02-20 PROCEDURE — 99233 PR SUBSEQUENT HOSPITAL CARE,LEVL III: ICD-10-PCS | Mod: ,,, | Performed by: PHYSICIAN ASSISTANT

## 2020-02-20 PROCEDURE — C9399 UNCLASSIFIED DRUGS OR BIOLOG: HCPCS | Performed by: HOSPITALIST

## 2020-02-20 PROCEDURE — 25000003 PHARM REV CODE 250: Performed by: INTERNAL MEDICINE

## 2020-02-20 PROCEDURE — 99232 SBSQ HOSP IP/OBS MODERATE 35: CPT | Mod: ,,, | Performed by: INTERNAL MEDICINE

## 2020-02-20 PROCEDURE — 25500020 PHARM REV CODE 255: Performed by: HOSPITALIST

## 2020-02-20 RX ADMIN — METRONIDAZOLE 500 MG: 500 TABLET ORAL at 06:02

## 2020-02-20 RX ADMIN — CIPROFLOXACIN HYDROCHLORIDE 500 MG: 500 TABLET, FILM COATED ORAL at 09:02

## 2020-02-20 RX ADMIN — POLYETHYLENE GLYCOL 3350 17 G: 17 POWDER, FOR SOLUTION ORAL at 09:02

## 2020-02-20 RX ADMIN — AMLODIPINE BESYLATE 10 MG: 10 TABLET ORAL at 09:02

## 2020-02-20 RX ADMIN — DOCUSATE SODIUM - SENNOSIDES 1 TABLET: 50; 8.6 TABLET, FILM COATED ORAL at 09:02

## 2020-02-20 RX ADMIN — METOPROLOL SUCCINATE 100 MG: 100 TABLET, EXTENDED RELEASE ORAL at 09:02

## 2020-02-20 RX ADMIN — APIXABAN 5 MG: 5 TABLET, FILM COATED ORAL at 09:02

## 2020-02-20 RX ADMIN — INSULIN DETEMIR 5 UNITS: 100 INJECTION, SOLUTION SUBCUTANEOUS at 10:02

## 2020-02-20 RX ADMIN — INSULIN ASPART 2 UNITS: 100 INJECTION, SOLUTION INTRAVENOUS; SUBCUTANEOUS at 09:02

## 2020-02-20 RX ADMIN — METRONIDAZOLE 500 MG: 500 TABLET ORAL at 10:02

## 2020-02-20 RX ADMIN — LEVOTHYROXINE SODIUM 200 MCG: 100 TABLET ORAL at 06:02

## 2020-02-20 RX ADMIN — CIPROFLOXACIN HYDROCHLORIDE 500 MG: 500 TABLET, FILM COATED ORAL at 10:02

## 2020-02-20 RX ADMIN — INSULIN ASPART 4 UNITS: 100 INJECTION, SOLUTION INTRAVENOUS; SUBCUTANEOUS at 09:02

## 2020-02-20 RX ADMIN — IOHEXOL 75 ML: 350 INJECTION, SOLUTION INTRAVENOUS at 01:02

## 2020-02-20 RX ADMIN — METRONIDAZOLE 500 MG: 500 TABLET ORAL at 01:02

## 2020-02-20 RX ADMIN — INSULIN ASPART 4 UNITS: 100 INJECTION, SOLUTION INTRAVENOUS; SUBCUTANEOUS at 01:02

## 2020-02-20 RX ADMIN — INSULIN ASPART 4 UNITS: 100 INJECTION, SOLUTION INTRAVENOUS; SUBCUTANEOUS at 06:02

## 2020-02-20 RX ADMIN — APIXABAN 5 MG: 5 TABLET, FILM COATED ORAL at 10:02

## 2020-02-20 NOTE — PT/OT/SLP PROGRESS
"Physical Therapy      Patient Name:  Jeni Chacon   MRN:  [id number]    Patient not seen today secondary to (pt reports having BM and requested only nursing assist with pericare. Pt positioned in slouched posture but declined to have therapist adjust her stating "no don't touch me I'm comfortable". ). Will follow-up as able.    Rafal Gutierres, PT    "

## 2020-02-20 NOTE — PROGRESS NOTES
[facility]  Infectious Disease  Progress Note    Patient Name: Jeni Chacon  MRN: [id number]  Admission Date: 2/7/2020  Length of Stay: 13 days  Attending Physician: Tiara Killian MD  Primary Care Provider: Primary Doctor No    Isolation Status: No active isolations  Assessment/Plan:      * Abscess of sigmoid colon  - ? Cause?  - IR unable to drain as no safe window.   - Repeat CT A/P: Redemonstration of an air and fluid collection adjacent to the sigmoid colon, which appears mildly increased in size however contains more air and less fluid as compared to prior study.  - recommend follow up with GI as oupt as needs consideration for colonoscopy to check for underlying pathology as cause of abscess  - continue ciprofloxacin and metronidazole x 4 weeks, end date 3/20/2020  - FU in 4 weeks in ID clinic with CT abd with contrast to assess fluid collection  - weeklyCBC and CMP ESR CRP sent to ID clinic at  attn to Dr. Snyder   - discussed with ID staff and primary team.       MSSA bacteremia  - 1/20/20 3/3 bottles with MSSA. Source? KESHIA negative from OSH.  shunt? Per records at outside hospital, cultures were negative.   - BCXs 1/22 NGTD  - pt completed 4 weeks of IV abx (end date 2/20/20)  - remained afebrile, stable. No leukocytosis.      Thank you for your consult. I will sign off. Please contact us if you have any additional questions.    Arik Nelson PA-C  Infectious Disease  [facility]    Subjective:     Principal Problem:Abscess of sigmoid colon    HPI: H&P taken from chart as patient unable to give reliable history:    Patient is a 74 year old female with medical history significant for hypothyroidism, HTN, DMII with neuropathy, tobacco abuse (with no diagnosis of COPD), and history of CVA s/p  shunt who initially presented to [place] with complaints of SOB and abdominal pain. Although patient is oriented to person, time, and place, she still is  having some odd behavior. So I gathered her history from her, her medical chart, and from calling her daughter. She reports that late January she had been having abdominal pain with associated SOB for a few weeks; despite a heavy smoking history, family reports that she has never had SOB or a diagnosis of COPD. She reports no alleviating or aggravating factors, just that the pain was sharp and diffuse.   She was admitted on 1/19 and had a complicated hospital course. Briefly, patient underwent CT abdomen on 1/20 which showed evidence of cholecystitis as well as an sigmoid colon abscess. She had a percutaneous cholecystostomy tube placed on 1/24. The abscess was not drained as IR felt it was unsafe to do so. During her hospital course, her blood cultures were positive for MSSA and she had evidence of UTI. Her antibiotics were changed a few times, but she currently is on cipro and flagyl for intraabdominal infection prophylaxis and on vanc and rifampin for MSSA bacteremia. KESHIA showed inferobasal hypokinesis and EF of 55%; no vegetation noted.  In addition to this she had O2 saturation in the mid-80s. Initially she was placed on bipap and treated for COPD exacerbation; she completed a course of steroids and got one dose of Lasix. However her respiratory status worsening and she was placed in MICU and required intubation from 1/20-1/31. She was Bipap dependent following extubation until 2/6 and is now on nasal cannula; of note, she did require pressors during MICU stay. On top of all this, patient also developed paroxysmal a fib. CHADSVASC elevated however she could not be anticoagulated due to gross hematuria requiring blood transfusion. Patient transferred for second IR opinion regarding drainage of sigmoid abscess and out of confusion and frustration from the multiple consultants at [facility].     Patient admitted and ID consulted for MSSA bacteremia and abdominal abscess.  Patient on Vanc/Cipro/Flagyl.  She  has been afebrile and WBC WNL. BCXS NGTD.  She says her  shunt was put in many years ago.  The patient denies any recent fever, chills, ABD, night sweats or sweats.  PICC in place.     CT ABD/Pelvis repeated:  - Interval decrease in of the small pelvic collection adjacent to the sigmoid colon.  This measures 3.2 x 3.0 x 2.4 cm today (previously 3.5 x 5.0 x 5.3 cm). -Given small size and interposed bowel and bladder, percutaneous drainage is not recommended at this time.  There is persistent mild sigmoid wall thickening and mesenteric stranding.  - The gallbladder is decompressed with a small bore catheter within the gallbladder.  Small amount of pericholecystic fluid.  - Mild hydronephrosis and hydroureter on the right with distal urothelial enhancement.  Degree of hydronephrosis may be slightly improved from prior..  While findings may be reactive due to the previously described pelvic processes, a urothelial neoplasm can not be definitively excluded.  Cystoscopy may be indicated upon resolution of the patient's acute presentation..  - Similar size of the thin walled cystic structure posterior to the stomach which now contains a small amount of gas.  It is uncertain whether this represents a gastric diverticulum  - Bibasilar atelectasis with partially evaluated left lower lung consolidation which could be secondary to aspiration or pneumonia.    IR defers aspiration.     Interval History: No AEON. AFebrile and WBC WNL. No complaints.  Repeat CT A/P: Redemonstration of an air and fluid collection adjacent to the sigmoid colon, which appears mildly increased in size however contains more air and less fluid as compared to prior study.    The patient denies any recent fever, chills, or sweats.      Review of Systems   Constitutional: Negative for activity change, chills, diaphoresis and fever.   Respiratory: Negative for cough, shortness of breath and wheezing.    Cardiovascular: Negative for chest pain.    Gastrointestinal: Negative for abdominal pain, constipation, diarrhea, nausea and vomiting.   Genitourinary: Negative for dysuria, frequency and urgency.   Neurological: Negative for dizziness.   Hematological: Does not bruise/bleed easily.     Objective:     Vital Signs (Most Recent):  Temp: 99.2 °F (37.3 °C) (02/20/20 1142)  Pulse: 85 (02/20/20 1142)  Resp: 14 (02/20/20 1142)  BP: 135/74 (02/20/20 1142)  SpO2: 95 % (02/20/20 1142) Vital Signs (24h Range):  Temp:  [98.2 °F (36.8 °C)-99.2 °F (37.3 °C)] 99.2 °F (37.3 °C)  Pulse:  [80-97] 85  Resp:  [14-20] 14  SpO2:  [93 %-98 %] 95 %  BP: (117-143)/(61-78) 135/74     Weight: 77.6 kg (171 lb 1.2 oz)  Body mass index is 28.47 kg/m².    Estimated Creatinine Clearance: 65.4 mL/min (based on SCr of 0.8 mg/dL).    Physical Exam   Constitutional: She appears well-developed and well-nourished. No distress.       HENT:   Head: Normocephalic and atraumatic.   Cardiovascular: Normal rate, regular rhythm and normal heart sounds. Exam reveals no gallop and no friction rub.   No murmur heard.  Pulmonary/Chest: Effort normal and breath sounds normal. No stridor. No respiratory distress. She has no wheezes. She has no rales.   Abdominal: Soft. Bowel sounds are normal. She exhibits no distension and no mass. There is no tenderness. There is no rebound and no guarding.   Musculoskeletal: She exhibits no edema.   Neurological: She is alert.   Skin: Skin is warm and dry. She is not diaphoretic.       Significant Labs:   Blood Culture:   Recent Labs   Lab 02/07/20  1847   LABBLOO No growth after 5 days.  No growth after 5 days.     CBC:   Recent Labs   Lab 02/19/20  0445   WBC 5.62   HGB 9.1*   HCT 29.1*   *     CMP:   Recent Labs   Lab 02/19/20  0445      K 3.4*      CO2 24   *   BUN 4*   CREATININE 0.8   CALCIUM 8.1*   ANIONGAP 8   EGFRNONAA >60.0     Urine Culture: No results for input(s): LABURIN in the last 4320 hours.  Urine Studies:   Recent Labs   Lab  02/08/20  1050   COLORU Yellow   APPEARANCEUA Cloudy*   PHUR 7.0   SPECGRAV 1.005   PROTEINUA Negative   GLUCUA Negative   KETONESU Negative   BILIRUBINUA Negative   OCCULTUA 3+*   NITRITE Negative   LEUKOCYTESUR 3+*   RBCUA 2   WBCUA 2   BACTERIA Many*   SQUAMEPITHEL 1     Wound Culture: No results for input(s): LABAERO in the last 4320 hours.  All pertinent labs within the past 24 hours have been reviewed.    Significant Imaging: I have reviewed all pertinent imaging results/findings within the past 24 hours.   CT Abdomen Pelvis With Contrast [[id number]] Resulted: 02/08/20 1351   Order Status: Completed Updated: 02/08/20 1354   Narrative:     EXAMINATION:  CT ABDOMEN PELVIS WITH CONTRAST    CLINICAL HISTORY:  to re-evaluate the sigmoid colon abscess;    TECHNIQUE:  Low dose axial images, sagittal and coronal reformations were obtained from the lung bases to the pubic symphysis following the IV administration of 75 mL of Omnipaque 350 and the oral administration of 30 mL of Omnipaque 350.    COMPARISON:  Outside CT 01/20/2023    FINDINGS:  Limited evaluations of the lung bases show atelectatic change.  Questionable consolidation in the left lower lobe dependently.  Visualized heart shows coronary atherosclerosis.    Liver is normal in size without focal mass or biliary ductal dilatation.  No biliary ductal dilatation.  The gallbladder is decompressed and contains gallstones.  There is a small percutaneous drain within the gallbladder.  Small amount of pericholecystic fluid..  There is small amount of pericholecystic fluid identified.    The spleen, adrenal glands, and pancreas show no focal abnormality.    The left kidney is normal in size without solid mass or hydronephrosis.  There is a large right peripelvic renal cyst measuring roughly 7.2 cm with minimal peripheral enhancement and no mural nodularity..  Mild right-sided hydronephrosis and hydroureter with mild distal urothelial enhancement of the distal right  ureter.  Urinary bladder shows mild wall thickening at the dome likely reactive due to the adjacent small fluid collection.    The gastrointestinal tract shows no evidence of obstruction.  Mild sigmoid wall thickening and mesenteric stranding.  At the inferior aspect of the sigmoid colon, superior to the bladder dome there is a 3.2 x 2.9 x 2.4 cm collection (previously reported 3.5 x 5.0 x 5.3 cm).  There is also a thin walled gas and fluid collection at the posterior aspect of the stomach measuring roughly 5.7 by 3.4 by 3.5 cm.  There is an enteric tube is within the distal stomach.  Intra-abdominal portion of a suspected ventriculoperitoneal shunt is identified and appears continuous.    The uterus appears grossly unremarkable.    Vascular structures show moderate atherosclerosis.  No acute fracture or destructive lesion.   Impression:       Interval decrease in of the small pelvic collection adjacent to the sigmoid colon.  This measures 3.2 x 3.0 x 2.4 cm today (previously 3.5 x 5.0 x 5.3 cm).  Given small size and interposed bowel and bladder, percutaneous drainage is not recommended at this time.  There is persistent mild sigmoid wall thickening and mesenteric stranding.    The gallbladder is decompressed with a small bore catheter within the gallbladder.  Small amount of pericholecystic fluid.    Mild hydronephrosis and hydroureter on the right with distal urothelial enhancement.  Degree of hydronephrosis may be slightly improved from prior..  While findings may be reactive due to the previously described pelvic processes, a urothelial neoplasm can not be definitively excluded.  Cystoscopy may be indicated upon resolution of the patient's acute presentation..    Similar size of the thin walled cystic structure posterior to the stomach which now contains a small amount of gas.  It is uncertain whether this represents a gastric diverticulum    Bibasilar atelectasis with partially evaluated left lower lung  consolidation which could be secondary to aspiration or pneumonia.      Electronically signed by: Carl Alegria MD  Date: 02/08/2020  Time: 13:51   X-Ray Chest 1 View [[id number]] Resulted: 02/08/20 0630   Order Status: Completed Updated: 02/08/20 0632   Narrative:     EXAMINATION:  XR CHEST 1 VIEW    CLINICAL HISTORY:  increased work of breathing;    TECHNIQUE:  Single frontal view of the chest was performed.    COMPARISON:  None.    FINDINGS:  Enteric catheter crosses the diaphragm with distal aspect not seen.  Right-sided PICC line with tip projecting over the SVC.  Mediastinal structures are midline.  Hilar contours are unremarkable.  Cardiac silhouette is magnified by AP technique.  Lung volumes are low but symmetric.  There is patchy increased attenuation within the left lower lung zone, possibly atelectasis or consolidation.  No pneumothorax.  No large pleural effusions.  No free air beneath the diaphragm.  Degenerative changes of the spine and shoulders noted.   Impression:       1. Hypoventilatory lung changes with patchy increased attenuation within the left lower lung zone which could relate to atelectasis or consolidation.  No pneumothorax or large pleural effusion.      Electronically signed by: Panchito Joyner MD  Date: 02/08/2020  Time: 06:30   Imaging History     2020  Date Procedure Name PACS Link Status Accession Number Location   02/08/20 10:43 AM CT Abdomen Pelvis With Contrast  [id number]   02/07/20 08:34 PM X-Ray Chest 1 View  Images Final [id number] [facility]

## 2020-02-20 NOTE — ASSESSMENT & PLAN NOTE
- Patient with evidence of  an abscess of 3.5 x 5 x 5.3 cm insinuated between the sigmoid colon and the dome of the urinary bladder, possibly in the setting of sigmoid diverticulitis  - Not drained by IR at the time as it was deemed unsafe to do so  - Family wanted transfer for IR second opinion.   - Evaluated by IR, decreased size of the sigmoid collection, intervention deferred for now  - Blood cultures x2 collected, per outside hospital records, blood cultures have been clear since 1/20 ( See MSSA bacteremia)  - Continue Cipro and flagyl until 3/20

## 2020-02-20 NOTE — PLAN OF CARE
02/20/20 0914   Post-Acute Status   Post-Acute Authorization Placement   Post-Acute Placement Status Referrals Sent     Pt accepted by [facility], will follow with insurance auth and SNF orders.

## 2020-02-20 NOTE — PROGRESS NOTES
[facility] Hospital Medicine  Progress Note    Patient Name: Jeni Chacon  MRN: [id number]  Patient Class: IP- Inpatient   Admission Date: 2/7/2020  Length of Stay: 13 days  Attending Physician: Tiara Killian MD  Primary Care Provider: Primary Doctor [facility] Medicine Team: [facility]OSP MED 2 Chani Mckenzie MD    Subjective:     Principal Problem:Abscess of sigmoid colon        HPI:  Patient is a 74 year old female with medical history significant for hypothyroidism, HTN, DMII with neuropathy, tobacco abuse (with no diagnosis of COPD), and history of CVA s/p  shunt who initially presented to [facility] with complaints of SOB and abdominal pain. Although patient is oriented to person, time, and place, she still is having some odd behavior. So I gathered her history from her, her medical chart, and from calling her daughter. She reports that late January she had been having abdominal pain with associated SOB for a few weeks; despite a heavy smoking history, family reports that she has never had SOB or a diagnosis of COPD. She reports no alleviating or aggravating factors, just that the pain was sharp and diffuse.   She was admitted on 1/19 and had a complicated hospital course. Briefly, patient underwent CT abdomen on 1/20 which showed evidence of cholecystitis as well as an sigmoid colon abscess. She had a percutaneous cholecystostomy tube placed on 1/24. The abscess was not drained as IR felt it was unsafe to do so. During her hospital course, her blood cultures were positive for MSSA and she had evidence of UTI. Her antibiotics were changed a few times, but she currently is on cipro and flagyl for intraabdominal infection prophylaxis and on vanc and rifampin for MSSA bacteremia. KESHIA showed inferobasal hypokinesis and EF of 55%; no vegetation noted.  In addition to this she had O2 saturation in the mid-80s. Initially she was placed on bipap and treated for COPD exacerbation; she  completed a course of steroids and got one dose of Lasix. However her respiratory status worsening and she was placed in MICU and required intubation from 1/20-1/31. She was Bipap dependent following extubation until 2/6 and is now on nasal cannula; of note, she did require pressors during MICU stay. On top of all this, patient also developed paroxysmal a fib. CHADSVASC elevated however she could not be anticoagulated due to gross hematuria requiring blood transfusion. Patient transferred for second IR opinion regarding drainage of sigmoid abscess and out of confusion and frustration from the multiple consultants at [facility].     At the time of my exam, patient was tachycardic, tachypneic, and mildly hypertension. She was AAO x3, however denied all symptoms on review of systems despite appear to have increased work of breathing. She reports that this is her baseline breathing and that she is on 3 L of O2 at home, however she exhibits odd behavior like trying to squirm out of the bed, or being unable to sign blood consent because it does not look right. Of note, patient was transferred with PICC line, however per chart review, it appears that her bacteremia resulted from cultures on 1/20, and the first vanc trough was taken on 1/23. Follow up culture have been negative and patient reports that she got her PICC line more recently. In addition, patient was transferred with NG tube. She reports that she is able to swallow and that the last time she ate was yesterday, however per nursing hand off, patient was to get formal speech eval which was not done due to transfer.      Overview/Hospital Course:  Patient admitted with sigmoid abscess for IR drainage, evaluated by IR and per repeat imaging, size reduced and no intervention. Also came with a percutaneous cholecystostomy tube placed for acute cholecystitis.   She is also evaluated by ID and continuing Vancomycin, Ciprofloxacin and Flagyl for 2 weeks and repeat CT  AP (2/20). Speech evaluated, to continue dysphagia diet.    Interval History: NAEON, patient to be discharged tomorrow.    Review of Systems   Constitutional: Negative for chills and fever.   Cardiovascular: Negative for chest pain.   Gastrointestinal: Negative for abdominal pain, constipation and vomiting.     Objective:     Vital Signs (Most Recent):  Temp: 99.2 °F (37.3 °C) (02/20/20 1142)  Pulse: 85 (02/20/20 1142)  Resp: 14 (02/20/20 1142)  BP: 135/74 (02/20/20 1142)  SpO2: 95 % (02/20/20 1142) Vital Signs (24h Range):  Temp:  [98.2 °F (36.8 °C)-99.2 °F (37.3 °C)] 99.2 °F (37.3 °C)  Pulse:  [80-97] 85  Resp:  [14-20] 14  SpO2:  [93 %-98 %] 95 %  BP: (117-143)/(61-78) 135/74     Weight: 77.6 kg (171 lb 1.2 oz)  Body mass index is 28.47 kg/m².  No intake or output data in the 24 hours ending 02/20/20 1344   Physical Exam   Constitutional: She appears well-developed and well-nourished.   HENT:   Head: Normocephalic and atraumatic.   Eyes: Pupils are equal, round, and reactive to light. EOM are normal.   Neck: Normal range of motion. No tracheal deviation present.   Cardiovascular: Normal heart sounds.   No murmur heard.  Pulmonary/Chest: Effort normal. She has no wheezes. She has no rales.   Abdominal: Soft. Bowel sounds are normal. There is no tenderness.   Percutaneous drain   Musculoskeletal: Normal range of motion. She exhibits no edema.   Neurological: She is alert.   Skin: Skin is warm and dry.       Significant Labs: All pertinent labs within the past 24 hours have been reviewed.    Significant Imaging: I have reviewed all pertinent imaging results/findings within the past 24 hours.      Assessment/Plan:      * Abscess of sigmoid colon  - Patient with evidence of  an abscess of 3.5 x 5 x 5.3 cm insinuated between the sigmoid colon and the dome of the urinary bladder, possibly in the setting of sigmoid diverticulitis  - Not drained by IR at the time as it was deemed unsafe to do so  - Family wanted transfer  for IR second opinion.   - Evaluated by IR, decreased size of the sigmoid collection, intervention deferred for now  - Blood cultures x2 collected, per outside hospital records, blood cultures have been clear since 1/20 ( See MSSA bacteremia)  - Continue Cipro and flagyl until 3/20      Hypokalemia  -replete prn      Dysphagia  -diet: pureed w/ nectar thick liquids        Paroxysmal A-fib  - Patient with reports of new onset paroxysmal afib during course of hospital stay  - Appears to be in sinus rhythm at the time of initial exam  - Continue metoprolol. Patient was getting 25 mg QID prior to transfer. Unclear if this was out of caution for hypotension or if increased frequency was needed for continued rate control. Will keep at this dose for now as patient may have received some doses prior to transfer. Consider switching to metoprolol succinate in the morning.   - Patient not anticoagulated at this time due to significant hematuria prior to transfer  * GVA2CV0-WEIl Score for Atrial Fibrillation Stroke Ris  RESULT SUMMARY:  7 points  Stroke risk was 11.2% per year in >90,000 patients (the [place] Atrial Fibrillation Cohort Study) and 15.7% risk of stroke/TIA/systemic embolism  INPUTS:  Age --> 1 = 65-74  Sex --> 1 = Female  CHF history --> 0 = No  Hypertension history --> 1 = Yes  Stroke/TIA/thromboembolism history --> 2 = Yes  Vascular disease history (prior MI, peripheral artery disease, or aortic plaque) --> 1 = Yes  Diabetes history --> 1 = Yes  -on apixaban 5mg BID          Hematuria  Blood loss anemia    - Patient with complicated hospital course including significant hematuria leading to blood loss anemia and need for blood transfusion.   - Hg stable at 8.5 for now. Patient has purewick, no signs of gross hematuria. INR 1.1  - type and screen ordered, blood consent signed  - Will keep cbc at daily frequency for now. If Hg with significant drop in the morning, consider increasing frequency and transfuse for  Hg<7  - in terms of anticoagulation (see paroxysmal afib), hold for now. If Hg remains stable, consider starting anticoagulation        COPD (chronic obstructive pulmonary disease)  Respiratory failure with hypoxia  Respiratory distress    - Patient treated for COPD exacerbation with full course of steroids and breathing treatments prior to transfer  - Patient does not have formal diagnosis of COPD but reports heavy smoking history at a pack a day for many years  - Patient also reports using 3 L O2 at home, but daughter is not sure. Unspecified chronicity of hypoxic respiratory failure  - Will continue breathing treatment q6 and incentive spirometry  - Will follow up vbg as patient has apparent increased work of breathing.  - Patient is not on any inhalers at home, she will likely need rescue inhaler and inhaled corticosteroid at minimum at discharge       MSSA bacteremia  - Patient with evidence of MSSA bacteremia on blood cultures taken on 1/20. First vanc trough drawn on 1/23  - Sensitivities show that staph aureus is sensitive to cefazolin and oxacillin. However patient is still on vanc with documented allergy to penicillins. However patient is unclear as to what reaction she gets and daughter is unclear as well  - In addition, it appears that patient would have received full course of antibiotic for bacteremia as no further blood cultures were positive for MSSA after 1/20 cultures  - Blood cultures repeated  - Evaluated by ID appreciate recs, vancomycin course completed  Off note  shunt was tapped by NSGREG in OSH on 1/25 with negative studies  Discussed with NSGY, as patient does not have meningitis,  shunt is not the source of MSSA and if menigitis suspected to first evaluate with LP.         Discharge planning issues  - Medically ready to go to SNF      Cholecystitis  - Ct scan done on 1/20 showed gallbladder to be markedly distended with sludge and stones, and with an indistinct wall. There was some  pericholecystic inflammation in the posterior part. She had a percutaneous cholecystostomy tube placed on 1/24 for the acute cholecystitis  - Drain still in place  - Will continue cipro flagyl       Type 2 diabetes mellitus, without long-term current use of insulin  - HA1c 6.9    Diet - Dental soft/dysphagia    - Insulin Detemir 5 daily  - Insulin Aspart 4 TID  - SSI     POCT Glucose - Before meals and QHS  Goal BG - 140-180              Hypothyroid  - Continue home levothyroxine.  - 2/7 thyroid labs show TSH elevated at 32, but T4 wnl of 1.05    Essential hypertension  - Patient takes amlodipine and hydrochlorothiazide at home  - metoprolol started for rate control for new onset afib prior to transfer  - Will resume metoprolol and amlodipine as patient is mildly hypertensive  - Resume hydrochlorothiazide as needed      VTE Risk Mitigation (From admission, onward)         Ordered     apixaban tablet 5 mg  2 times daily      02/13/20 0927     Place sequential compression device  Until discontinued      02/08/20 0233     IP VTE HIGH RISK PATIENT  Once      02/07/20 1915                      Chani Mckenzie MD  Department of Hospital Medicine   [facility]

## 2020-02-20 NOTE — ASSESSMENT & PLAN NOTE
- 1/20/20 3/3 bottles with MSSA. Source? KESHIA negative from OSH.  shunt? Per records at outside hospital, cultures were negative.   - BCXs 1/22 NGTD  - pt completed 4 weeks of IV abx (end date 2/20/20)  - remained afebrile, stable. No leukocytosis.

## 2020-02-20 NOTE — ASSESSMENT & PLAN NOTE
- ? Cause?  - IR unable to drain as no safe window.   - Repeat CT A/P: Redemonstration of an air and fluid collection adjacent to the sigmoid colon, which appears mildly increased in size however contains more air and less fluid as compared to prior study.  - recommend follow up with GI as oupt as needs consideration for colonoscopy to check for underlying pathology as cause of abscess  - continue ciprofloxacin and metronidazole x 4 weeks, end date 3/20/2020  - FU in 4 weeks in ID clinic with CT abd with contrast to assess fluid collection  - weeklyCBC and CMP ESR CRP sent to ID clinic at  attn to Dr. Snyder   - discussed with ID staff and primary team.

## 2020-02-20 NOTE — SUBJECTIVE & OBJECTIVE
Interval History: No AEON. AFebrile and WBC WNL. No complaints.  Repeat CT A/P: Redemonstration of an air and fluid collection adjacent to the sigmoid colon, which appears mildly increased in size however contains more air and less fluid as compared to prior study.    The patient denies any recent fever, chills, or sweats.      Review of Systems   Constitutional: Negative for activity change, chills, diaphoresis and fever.   Respiratory: Negative for cough, shortness of breath and wheezing.    Cardiovascular: Negative for chest pain.   Gastrointestinal: Negative for abdominal pain, constipation, diarrhea, nausea and vomiting.   Genitourinary: Negative for dysuria, frequency and urgency.   Neurological: Negative for dizziness.   Hematological: Does not bruise/bleed easily.     Objective:     Vital Signs (Most Recent):  Temp: 99.2 °F (37.3 °C) (02/20/20 1142)  Pulse: 85 (02/20/20 1142)  Resp: 14 (02/20/20 1142)  BP: 135/74 (02/20/20 1142)  SpO2: 95 % (02/20/20 1142) Vital Signs (24h Range):  Temp:  [98.2 °F (36.8 °C)-99.2 °F (37.3 °C)] 99.2 °F (37.3 °C)  Pulse:  [80-97] 85  Resp:  [14-20] 14  SpO2:  [93 %-98 %] 95 %  BP: (117-143)/(61-78) 135/74     Weight: 77.6 kg (171 lb 1.2 oz)  Body mass index is 28.47 kg/m².    Estimated Creatinine Clearance: 65.4 mL/min (based on SCr of 0.8 mg/dL).    Physical Exam   Constitutional: She appears well-developed and well-nourished. No distress.       HENT:   Head: Normocephalic and atraumatic.   Cardiovascular: Normal rate, regular rhythm and normal heart sounds. Exam reveals no gallop and no friction rub.   No murmur heard.  Pulmonary/Chest: Effort normal and breath sounds normal. No stridor. No respiratory distress. She has no wheezes. She has no rales.   Abdominal: Soft. Bowel sounds are normal. She exhibits no distension and no mass. There is no tenderness. There is no rebound and no guarding.   Musculoskeletal: She exhibits no edema.   Neurological: She is alert.   Skin: Skin  is warm and dry. She is not diaphoretic.       Significant Labs:   Blood Culture:   Recent Labs   Lab 02/07/20  1847   LABBLOO No growth after 5 days.  No growth after 5 days.     CBC:   Recent Labs   Lab 02/19/20  0445   WBC 5.62   HGB 9.1*   HCT 29.1*   *     CMP:   Recent Labs   Lab 02/19/20  0445      K 3.4*      CO2 24   *   BUN 4*   CREATININE 0.8   CALCIUM 8.1*   ANIONGAP 8   EGFRNONAA >60.0     Urine Culture: No results for input(s): LABURIN in the last 4320 hours.  Urine Studies:   Recent Labs   Lab 02/08/20  1050   COLORU Yellow   APPEARANCEUA Cloudy*   PHUR 7.0   SPECGRAV 1.005   PROTEINUA Negative   GLUCUA Negative   KETONESU Negative   BILIRUBINUA Negative   OCCULTUA 3+*   NITRITE Negative   LEUKOCYTESUR 3+*   RBCUA 2   WBCUA 2   BACTERIA Many*   SQUAMEPITHEL 1     Wound Culture: No results for input(s): LABAERO in the last 4320 hours.  All pertinent labs within the past 24 hours have been reviewed.    Significant Imaging: I have reviewed all pertinent imaging results/findings within the past 24 hours.   CT Abdomen Pelvis With Contrast [[id number]] Resulted: 02/08/20 1351   Order Status: Completed Updated: 02/08/20 1354   Narrative:     EXAMINATION:  CT ABDOMEN PELVIS WITH CONTRAST    CLINICAL HISTORY:  to re-evaluate the sigmoid colon abscess;    TECHNIQUE:  Low dose axial images, sagittal and coronal reformations were obtained from the lung bases to the pubic symphysis following the IV administration of 75 mL of Omnipaque 350 and the oral administration of 30 mL of Omnipaque 350.    COMPARISON:  Outside CT 01/20/2023    FINDINGS:  Limited evaluations of the lung bases show atelectatic change.  Questionable consolidation in the left lower lobe dependently.  Visualized heart shows coronary atherosclerosis.    Liver is normal in size without focal mass or biliary ductal dilatation.  No biliary ductal dilatation.  The gallbladder is decompressed and contains gallstones.  There is  a small percutaneous drain within the gallbladder.  Small amount of pericholecystic fluid..  There is small amount of pericholecystic fluid identified.    The spleen, adrenal glands, and pancreas show no focal abnormality.    The left kidney is normal in size without solid mass or hydronephrosis.  There is a large right peripelvic renal cyst measuring roughly 7.2 cm with minimal peripheral enhancement and no mural nodularity..  Mild right-sided hydronephrosis and hydroureter with mild distal urothelial enhancement of the distal right ureter.  Urinary bladder shows mild wall thickening at the dome likely reactive due to the adjacent small fluid collection.    The gastrointestinal tract shows no evidence of obstruction.  Mild sigmoid wall thickening and mesenteric stranding.  At the inferior aspect of the sigmoid colon, superior to the bladder dome there is a 3.2 x 2.9 x 2.4 cm collection (previously reported 3.5 x 5.0 x 5.3 cm).  There is also a thin walled gas and fluid collection at the posterior aspect of the stomach measuring roughly 5.7 by 3.4 by 3.5 cm.  There is an enteric tube is within the distal stomach.  Intra-abdominal portion of a suspected ventriculoperitoneal shunt is identified and appears continuous.    The uterus appears grossly unremarkable.    Vascular structures show moderate atherosclerosis.  No acute fracture or destructive lesion.   Impression:       Interval decrease in of the small pelvic collection adjacent to the sigmoid colon.  This measures 3.2 x 3.0 x 2.4 cm today (previously 3.5 x 5.0 x 5.3 cm).  Given small size and interposed bowel and bladder, percutaneous drainage is not recommended at this time.  There is persistent mild sigmoid wall thickening and mesenteric stranding.    The gallbladder is decompressed with a small bore catheter within the gallbladder.  Small amount of pericholecystic fluid.    Mild hydronephrosis and hydroureter on the right with distal urothelial enhancement.   Degree of hydronephrosis may be slightly improved from prior..  While findings may be reactive due to the previously described pelvic processes, a urothelial neoplasm can not be definitively excluded.  Cystoscopy may be indicated upon resolution of the patient's acute presentation..    Similar size of the thin walled cystic structure posterior to the stomach which now contains a small amount of gas.  It is uncertain whether this represents a gastric diverticulum    Bibasilar atelectasis with partially evaluated left lower lung consolidation which could be secondary to aspiration or pneumonia.      Electronically signed by: Carl Alegria MD  Date: 02/08/2020  Time: 13:51   X-Ray Chest 1 View [[id number]] Resulted: 02/08/20 0630   Order Status: Completed Updated: 02/08/20 0632   Narrative:     EXAMINATION:  XR CHEST 1 VIEW    CLINICAL HISTORY:  increased work of breathing;    TECHNIQUE:  Single frontal view of the chest was performed.    COMPARISON:  None.    FINDINGS:  Enteric catheter crosses the diaphragm with distal aspect not seen.  Right-sided PICC line with tip projecting over the SVC.  Mediastinal structures are midline.  Hilar contours are unremarkable.  Cardiac silhouette is magnified by AP technique.  Lung volumes are low but symmetric.  There is patchy increased attenuation within the left lower lung zone, possibly atelectasis or consolidation.  No pneumothorax.  No large pleural effusions.  No free air beneath the diaphragm.  Degenerative changes of the spine and shoulders noted.   Impression:       1. Hypoventilatory lung changes with patchy increased attenuation within the left lower lung zone which could relate to atelectasis or consolidation.  No pneumothorax or large pleural effusion.      Electronically signed by: Panchito Joyner MD  Date: 02/08/2020  Time: 06:30   Imaging History     2020  Date Procedure Name PACS Link Status Accession Number Location   02/08/20 10:43 AM CT Abdomen Pelvis With  Contrast  Images Final [id number] [facility]   02/07/20 08:34 PM X-Ray Chest 1 View  Images Final [id number] [facility]

## 2020-02-20 NOTE — PROGRESS NOTES
Therapy with vanc complete and/or consult discontinued by provider.  Pharmacy will sign off, please re-consult as needed.    Kayla Wayne, PharmD, BCPS  [id number]

## 2020-02-20 NOTE — SUBJECTIVE & OBJECTIVE
Interval History: NAEON, patient to be discharged tomorrow.    Review of Systems   Constitutional: Negative for chills and fever.   Cardiovascular: Negative for chest pain.   Gastrointestinal: Negative for abdominal pain, constipation and vomiting.     Objective:     Vital Signs (Most Recent):  Temp: 99.2 °F (37.3 °C) (02/20/20 1142)  Pulse: 85 (02/20/20 1142)  Resp: 14 (02/20/20 1142)  BP: 135/74 (02/20/20 1142)  SpO2: 95 % (02/20/20 1142) Vital Signs (24h Range):  Temp:  [98.2 °F (36.8 °C)-99.2 °F (37.3 °C)] 99.2 °F (37.3 °C)  Pulse:  [80-97] 85  Resp:  [14-20] 14  SpO2:  [93 %-98 %] 95 %  BP: (117-143)/(61-78) 135/74     Weight: 77.6 kg (171 lb 1.2 oz)  Body mass index is 28.47 kg/m².  No intake or output data in the 24 hours ending 02/20/20 1344   Physical Exam   Constitutional: She appears well-developed and well-nourished.   HENT:   Head: Normocephalic and atraumatic.   Eyes: Pupils are equal, round, and reactive to light. EOM are normal.   Neck: Normal range of motion. No tracheal deviation present.   Cardiovascular: Normal heart sounds.   No murmur heard.  Pulmonary/Chest: Effort normal. She has no wheezes. She has no rales.   Abdominal: Soft. Bowel sounds are normal. There is no tenderness.   Percutaneous drain   Musculoskeletal: Normal range of motion. She exhibits no edema.   Neurological: She is alert.   Skin: Skin is warm and dry.       Significant Labs: All pertinent labs within the past 24 hours have been reviewed.    Significant Imaging: I have reviewed all pertinent imaging results/findings within the past 24 hours.

## 2020-02-20 NOTE — ASSESSMENT & PLAN NOTE
- Patient with evidence of MSSA bacteremia on blood cultures taken on 1/20. First vanc trough drawn on 1/23  - Sensitivities show that staph aureus is sensitive to cefazolin and oxacillin. However patient is still on vanc with documented allergy to penicillins. However patient is unclear as to what reaction she gets and daughter is unclear as well  - In addition, it appears that patient would have received full course of antibiotic for bacteremia as no further blood cultures were positive for MSSA after 1/20 cultures  - Blood cultures repeated  - Evaluated by ID appreciate recs, vancomycin course completed  Off note  shunt was tapped by NSGREG in OSH on 1/25 with negative studies  Discussed with NSGY, as patient does not have meningitis,  shunt is not the source of MSSA and if menigitis suspected to first evaluate with LP.

## 2020-02-21 LAB
ANION GAP SERPL CALC-SCNC: 10 MMOL/L (ref 8–16)
BASOPHILS # BLD AUTO: 0.02 K/UL (ref 0–0.2)
BASOPHILS NFR BLD: 0.3 % (ref 0–1.9)
BUN SERPL-MCNC: 4 MG/DL (ref 8–23)
CALCIUM SERPL-MCNC: 8.3 MG/DL (ref 8.7–10.5)
CHLORIDE SERPL-SCNC: 104 MMOL/L (ref 95–110)
CO2 SERPL-SCNC: 24 MMOL/L (ref 23–29)
CREAT SERPL-MCNC: 0.9 MG/DL (ref 0.5–1.4)
DIFFERENTIAL METHOD: ABNORMAL
EOSINOPHIL # BLD AUTO: 0.2 K/UL (ref 0–0.5)
EOSINOPHIL NFR BLD: 2.6 % (ref 0–8)
ERYTHROCYTE [DISTWIDTH] IN BLOOD BY AUTOMATED COUNT: 19.2 % (ref 11.5–14.5)
EST. GFR  (AFRICAN AMERICAN): >60 ML/MIN/1.73 M^2
EST. GFR  (NON AFRICAN AMERICAN): >60 ML/MIN/1.73 M^2
GLUCOSE SERPL-MCNC: 148 MG/DL (ref 70–110)
HCT VFR BLD AUTO: 28.3 % (ref 37–48.5)
HGB BLD-MCNC: 8.8 G/DL (ref 12–16)
IMM GRANULOCYTES # BLD AUTO: 0.02 K/UL (ref 0–0.04)
IMM GRANULOCYTES NFR BLD AUTO: 0.3 % (ref 0–0.5)
LYMPHOCYTES # BLD AUTO: 1.4 K/UL (ref 1–4.8)
LYMPHOCYTES NFR BLD: 17.1 % (ref 18–48)
MCH RBC QN AUTO: 27.9 PG (ref 27–31)
MCHC RBC AUTO-ENTMCNC: 31.1 G/DL (ref 32–36)
MCV RBC AUTO: 90 FL (ref 82–98)
MONOCYTES # BLD AUTO: 0.9 K/UL (ref 0.3–1)
MONOCYTES NFR BLD: 11.7 % (ref 4–15)
NEUTROPHILS # BLD AUTO: 5.4 K/UL (ref 1.8–7.7)
NEUTROPHILS NFR BLD: 68 % (ref 38–73)
NRBC BLD-RTO: 0 /100 WBC
PLATELET # BLD AUTO: 479 K/UL (ref 150–350)
PMV BLD AUTO: 9.3 FL (ref 9.2–12.9)
POCT GLUCOSE: 146 MG/DL (ref 70–110)
POCT GLUCOSE: 152 MG/DL (ref 70–110)
POCT GLUCOSE: 178 MG/DL (ref 70–110)
POCT GLUCOSE: 186 MG/DL (ref 70–110)
POTASSIUM SERPL-SCNC: 4.4 MMOL/L (ref 3.5–5.1)
RBC # BLD AUTO: 3.15 M/UL (ref 4–5.4)
SODIUM SERPL-SCNC: 138 MMOL/L (ref 136–145)
WBC # BLD AUTO: 7.96 K/UL (ref 3.9–12.7)

## 2020-02-21 PROCEDURE — 86580 TB INTRADERMAL TEST: CPT | Performed by: INTERNAL MEDICINE

## 2020-02-21 PROCEDURE — 11000001 HC ACUTE MED/SURG PRIVATE ROOM

## 2020-02-21 PROCEDURE — 25000003 PHARM REV CODE 250: Performed by: INTERNAL MEDICINE

## 2020-02-21 PROCEDURE — 97110 THERAPEUTIC EXERCISES: CPT

## 2020-02-21 PROCEDURE — 30200315 PPD INTRADERMAL TEST REV CODE 302: Performed by: INTERNAL MEDICINE

## 2020-02-21 PROCEDURE — 99232 SBSQ HOSP IP/OBS MODERATE 35: CPT | Mod: ,,, | Performed by: INTERNAL MEDICINE

## 2020-02-21 PROCEDURE — 25000003 PHARM REV CODE 250: Performed by: STUDENT IN AN ORGANIZED HEALTH CARE EDUCATION/TRAINING PROGRAM

## 2020-02-21 PROCEDURE — 80048 BASIC METABOLIC PNL TOTAL CA: CPT

## 2020-02-21 PROCEDURE — 36415 COLL VENOUS BLD VENIPUNCTURE: CPT

## 2020-02-21 PROCEDURE — 99900035 HC TECH TIME PER 15 MIN (STAT)

## 2020-02-21 PROCEDURE — 99232 PR SUBSEQUENT HOSPITAL CARE,LEVL II: ICD-10-PCS | Mod: ,,, | Performed by: INTERNAL MEDICINE

## 2020-02-21 PROCEDURE — 97530 THERAPEUTIC ACTIVITIES: CPT

## 2020-02-21 PROCEDURE — 27000221 HC OXYGEN, UP TO 24 HOURS

## 2020-02-21 PROCEDURE — 85025 COMPLETE CBC W/AUTO DIFF WBC: CPT

## 2020-02-21 RX ORDER — METRONIDAZOLE 500 MG/1
500 TABLET ORAL EVERY 8 HOURS
Qty: 84 TABLET | Refills: 0
Start: 2020-02-21 | End: 2020-02-24

## 2020-02-21 RX ORDER — CIPROFLOXACIN 500 MG/1
500 TABLET ORAL EVERY 12 HOURS
Qty: 56 TABLET | Refills: 0
Start: 2020-02-21 | End: 2020-02-24

## 2020-02-21 RX ADMIN — METOPROLOL SUCCINATE 100 MG: 100 TABLET, EXTENDED RELEASE ORAL at 09:02

## 2020-02-21 RX ADMIN — INSULIN ASPART 4 UNITS: 100 INJECTION, SOLUTION INTRAVENOUS; SUBCUTANEOUS at 08:02

## 2020-02-21 RX ADMIN — DOCUSATE SODIUM - SENNOSIDES 1 TABLET: 50; 8.6 TABLET, FILM COATED ORAL at 09:02

## 2020-02-21 RX ADMIN — APIXABAN 5 MG: 5 TABLET, FILM COATED ORAL at 09:02

## 2020-02-21 RX ADMIN — METRONIDAZOLE 500 MG: 500 TABLET ORAL at 06:02

## 2020-02-21 RX ADMIN — LEVOTHYROXINE SODIUM 200 MCG: 100 TABLET ORAL at 06:02

## 2020-02-21 RX ADMIN — INSULIN ASPART 4 UNITS: 100 INJECTION, SOLUTION INTRAVENOUS; SUBCUTANEOUS at 05:02

## 2020-02-21 RX ADMIN — INSULIN ASPART 4 UNITS: 100 INJECTION, SOLUTION INTRAVENOUS; SUBCUTANEOUS at 11:02

## 2020-02-21 RX ADMIN — CIPROFLOXACIN HYDROCHLORIDE 500 MG: 500 TABLET, FILM COATED ORAL at 09:02

## 2020-02-21 RX ADMIN — INSULIN DETEMIR 5 UNITS: 100 INJECTION, SOLUTION SUBCUTANEOUS at 09:02

## 2020-02-21 RX ADMIN — AMLODIPINE BESYLATE 10 MG: 10 TABLET ORAL at 09:02

## 2020-02-21 RX ADMIN — METRONIDAZOLE 500 MG: 500 TABLET ORAL at 02:02

## 2020-02-21 RX ADMIN — METRONIDAZOLE 500 MG: 500 TABLET ORAL at 09:02

## 2020-02-21 NOTE — PLAN OF CARE
Problem: Occupational Therapy Goal  Goal: Occupational Therapy Goal  Description  Pt will complete GH tasks with stand by assistance Met  Pt will perform bed mobility with stand by assist  Pt will complete UB dressing with contact guard assist  Pt will follow simple commands 2/3 trials for GH task  Pt will sit edge of bed for >10 minutes for functional task with contact guard assist Met    Pt will be independent and perform 2 x 10 B UE ROM exercises.   Outcome: Ongoing, Progressing   Patient's goals are appropriate. Added exercise goal  NADEEN Hughes  2/21/2020

## 2020-02-21 NOTE — PROGRESS NOTES
[facility] Hospital Medicine  Progress Note    Patient Name: Jeni Chacon  MRN: [id number]  Patient Class: IP- Inpatient   Admission Date: 2/7/2020  Length of Stay: 14 days  Attending Physician: Tiara Killian MD  Primary Care Provider: [facility] Medicine Team: [facility] 2 Chani Mckenzie MD    Subjective:     Principal Problem:Abscess of sigmoid colon        HPI:  Patient is a 74 year old female with medical history significant for hypothyroidism, HTN, DMII with neuropathy, tobacco abuse (with no diagnosis of COPD), and history of CVA s/p  shunt who initially presented to [place] with complaints of SOB and abdominal pain. Although patient is oriented to person, time, and place, she still is having some odd behavior. So I gathered her history from her, her medical chart, and from calling her daughter. She reports that late January she had been having abdominal pain with associated SOB for a few weeks; despite a heavy smoking history, family reports that she has never had SOB or a diagnosis of COPD. She reports no alleviating or aggravating factors, just that the pain was sharp and diffuse.   She was admitted on 1/19 and had a complicated hospital course. Briefly, patient underwent CT abdomen on 1/20 which showed evidence of cholecystitis as well as an sigmoid colon abscess. She had a percutaneous cholecystostomy tube placed on 1/24. The abscess was not drained as IR felt it was unsafe to do so. During her hospital course, her blood cultures were positive for MSSA and she had evidence of UTI. Her antibiotics were changed a few times, but she currently is on cipro and flagyl for intraabdominal infection prophylaxis and on vanc and rifampin for MSSA bacteremia. KESHIA showed inferobasal hypokinesis and EF of 55%; no vegetation noted.  In addition to this she had O2 saturation in the mid-80s. Initially she was placed on bipap and treated for COPD exacerbation; she  completed a course of steroids and got one dose of Lasix. However her respiratory status worsening and she was placed in MICU and required intubation from 1/20-1/31. She was Bipap dependent following extubation until 2/6 and is now on nasal cannula; of note, she did require pressors during MICU stay. On top of all this, patient also developed paroxysmal a fib. CHADSVASC elevated however she could not be anticoagulated due to gross hematuria requiring blood transfusion. Patient transferred for second IR opinion regarding drainage of sigmoid abscess and out of confusion and frustration from the multiple consultants at [place].     At the time of my exam, patient was tachycardic, tachypneic, and mildly hypertension. She was AAO x3, however denied all symptoms on review of systems despite appear to have increased work of breathing. She reports that this is her baseline breathing and that she is on 3 L of O2 at home, however she exhibits odd behavior like trying to squirm out of the bed, or being unable to sign blood consent because it does not look right. Of note, patient was transferred with PICC line, however per chart review, it appears that her bacteremia resulted from cultures on 1/20, and the first vanc trough was taken on 1/23. Follow up culture have been negative and patient reports that she got her PICC line more recently. In addition, patient was transferred with NG tube. She reports that she is able to swallow and that the last time she ate was yesterday, however per nursing hand off, patient was to get formal speech eval which was not done due to transfer.      Overview/Hospital Course:  Patient admitted with sigmoid abscess for IR drainage, evaluated by IR and per repeat imaging, size reduced and no intervention. Also came with a percutaneous cholecystostomy tube placed for acute cholecystitis.   She is also evaluated by ID and continuing Vancomycin, Ciprofloxacin and Flagyl for 2 weeks and repeat CT  AP (). Speech evaluated, to continue dysphagia diet.    Past Medical History:   Diagnosis Date    Brain aneurysm     Cancer     thyroid    Diabetes mellitus     Diabetic neuropathy     Hypertension     Hypothyroid     Stroke     Tobacco abuse        Past Surgical History:   Procedure Laterality Date    Breast screening      FETOSCOPIC LASER OF POSTERIOR URETHRAL VALVE W/ SHUNT PLACEMENT      OTHER SURGICAL HISTORY      stent in brain     THYROID SURGERY      TUBAL LIGATION         Review of patient's allergies indicates:   Allergen Reactions    Lisinopril Swelling    Penicillins        No current facility-administered medications on file prior to encounter.      Current Outpatient Medications on File Prior to Encounter   Medication Sig    amLODIPine (NORVASC) 10 MG tablet Take 10 mg by mouth once daily.    conjugated estrogens (PREMARIN) vaginal cream Place vaginally once daily.    glimepiride (AMARYL) 4 MG tablet Take 4 mg by mouth before breakfast.    hydroCHLOROthiazide (HYDRODIURIL) 12.5 MG Tab Take 12.5 mg by mouth once daily.    levothyroxine (SYNTHROID) 200 MCG tablet Take 200 mcg by mouth before breakfast.     Family History     Problem Relation (Age of Onset)    Diabetes Mother    HIV Son    Heart disease Mother, Son    Hypertension Mother        Tobacco Use    Smoking status: Former Smoker     Packs/day: 1.00     Types: Cigarettes     Last attempt to quit: 2019     Years since quittin.6    Tobacco comment: Many years   Substance and Sexual Activity    Alcohol use: No    Drug use: No    Sexual activity: Not on file     Review of Systems   Constitutional: Negative for activity change, chills, diaphoresis and fever.   Respiratory: Negative for cough, shortness of breath and wheezing.    Cardiovascular: Negative for chest pain.   Gastrointestinal: Negative for abdominal pain, constipation, diarrhea, nausea and vomiting.   Genitourinary: Negative for dysuria, frequency and  urgency.   Neurological: Negative for dizziness.   Hematological: Does not bruise/bleed easily.     Objective:     Vital Signs (Most Recent):  Temp: 98.1 °F (36.7 °C) (02/21/20 0801)  Pulse: 84 (02/21/20 0801)  Resp: 16 (02/21/20 0801)  BP: 122/70 (02/21/20 0801)  SpO2: 96 % (02/21/20 0801) Vital Signs (24h Range):  Temp:  [97.6 °F (36.4 °C)-98.7 °F (37.1 °C)] 98.1 °F (36.7 °C)  Pulse:  [84-89] 84  Resp:  [16-20] 16  SpO2:  [92 %-96 %] 96 %  BP: (120-132)/(64-70) 122/70     Weight: 77.6 kg (171 lb 1.2 oz)  Body mass index is 28.47 kg/m².    Physical Exam   Constitutional: She appears well-developed and well-nourished.   HENT:   Head: Normocephalic and atraumatic.   Eyes: Pupils are equal, round, and reactive to light. EOM are normal.   Neck: Normal range of motion. No tracheal deviation present.   Cardiovascular: Normal heart sounds.   No murmur heard.  Pulmonary/Chest: Effort normal. She has no wheezes. She has no rales.   Abdominal: Soft. Bowel sounds are normal. There is no tenderness.   Percutaneous drain   Musculoskeletal: Normal range of motion. She exhibits no edema.   Neurological: She is alert.   Skin: Skin is warm and dry.         CRANIAL NERVES     CN III, IV, VI   Pupils are equal, round, and reactive to light.  Extraocular motions are normal.        Significant Labs: All pertinent labs within the past 24 hours have been reviewed.    Significant Imaging: I have reviewed all pertinent imaging results/findings within the past 24 hours.      Assessment/Plan:      * Abscess of sigmoid colon  - Patient with evidence of  an abscess of 3.5 x 5 x 5.3 cm insinuated between the sigmoid colon and the dome of the urinary bladder, possibly in the setting of sigmoid diverticulitis  - Not drained by IR at the time as it was deemed unsafe to do so  - Family wanted transfer for IR second opinion.   - Evaluated by IR, decreased size of the sigmoid collection, intervention deferred for now  - Blood cultures x2 collected,  per outside hospital records, blood cultures have been clear since 1/20 ( See MSSA bacteremia)  - Continue Cipro and flagyl until 3/20      Hypokalemia  -replete prn      Dysphagia  -diet: pureed w/ nectar thick liquids        Paroxysmal A-fib  - Patient with reports of new onset paroxysmal afib during course of hospital stay  - Appears to be in sinus rhythm at the time of initial exam  - Continue metoprolol. Patient was getting 25 mg QID prior to transfer. Unclear if this was out of caution for hypotension or if increased frequency was needed for continued rate control. Will keep at this dose for now as patient may have received some doses prior to transfer. Consider switching to metoprolol succinate in the morning.   - Patient not anticoagulated at this time due to significant hematuria prior to transfer  * [id number] Score for Atrial Fibrillation Stroke Ris  RESULT SUMMARY:  7 points  Stroke risk was 11.2% per year in >90,000 patients (the [place] Atrial Fibrillation Cohort Study) and 15.7% risk of stroke/TIA/systemic embolism  INPUTS:  Age --> 1 = 65-74  Sex --> 1 = Female  CHF history --> 0 = No  Hypertension history --> 1 = Yes  Stroke/TIA/thromboembolism history --> 2 = Yes  Vascular disease history (prior MI, peripheral artery disease, or aortic plaque) --> 1 = Yes  Diabetes history --> 1 = Yes  -on apixaban 5mg BID          Hematuria  Blood loss anemia    - Patient with complicated hospital course including significant hematuria leading to blood loss anemia and need for blood transfusion.   - Hg stable at 8.5 for now. Patient has purewick, no signs of gross hematuria. INR 1.1  - type and screen ordered, blood consent signed  - Will keep cbc at daily frequency for now. If Hg with significant drop in the morning, consider increasing frequency and transfuse for Hg<7  - in terms of anticoagulation (see paroxysmal afib), hold for now. If Hg remains stable, consider starting anticoagulation        COPD (chronic  obstructive pulmonary disease)  Respiratory failure with hypoxia  Respiratory distress    - Patient treated for COPD exacerbation with full course of steroids and breathing treatments prior to transfer  - Patient does not have formal diagnosis of COPD but reports heavy smoking history at a pack a day for many years  - Patient also reports using 3 L O2 at home, but daughter is not sure. Unspecified chronicity of hypoxic respiratory failure  - Will continue breathing treatment q6 and incentive spirometry  - Will follow up vbg as patient has apparent increased work of breathing.  - Patient is not on any inhalers at home, she will likely need rescue inhaler and inhaled corticosteroid at minimum at discharge       MSSA bacteremia  - Patient with evidence of MSSA bacteremia on blood cultures taken on 1/20. First vanc trough drawn on 1/23  - Sensitivities show that staph aureus is sensitive to cefazolin and oxacillin. However patient is still on vanc with documented allergy to penicillins. However patient is unclear as to what reaction she gets and daughter is unclear as well  - In addition, it appears that patient would have received full course of antibiotic for bacteremia as no further blood cultures were positive for MSSA after 1/20 cultures  - Blood cultures repeated  - Evaluated by ID appreciate recs, vancomycin course completed  Off note  shunt was tapped by NSGY in OSH on 1/25 with negative studies  Discussed with NSGY, as patient does not have meningitis,  shunt is not the source of MSSA and if menigitis suspected to first evaluate with LP.         Discharge planning issues  - Medically ready to go to SNF  Follow up OP with GI and ID on 3/20 with repeat CT AP with contrast   - weeklyCBC and CMP ESR CRP sent to ID clinic at  attn to Dr. Snyder         Cholecystitis  - Ct scan done on 1/20 showed gallbladder to be markedly distended with sludge and stones, and with an indistinct wall. There was some  pericholecystic inflammation in the posterior part. She had a percutaneous cholecystostomy tube placed on 1/24 for the acute cholecystitis  - Drain still in place  - Will continue cipro flagyl       Type 2 diabetes mellitus, without long-term current use of insulin  - HA1c 6.9    Diet - Dental soft/dysphagia    - Insulin Detemir 5 daily  - Insulin Aspart 4 TID  - SSI     POCT Glucose - Before meals and QHS  Goal BG - 140-180              Hypothyroid  - Continue home levothyroxine.  - 2/7 thyroid labs show TSH elevated at 32, but T4 wnl of 1.05    Essential hypertension  - Patient takes amlodipine and hydrochlorothiazide at home  - metoprolol started for rate control for new onset afib prior to transfer  - Will resume metoprolol and amlodipine as patient is mildly hypertensive  - Resume hydrochlorothiazide as needed        VTE Risk Mitigation (From admission, onward)         Ordered     apixaban tablet 5 mg  2 times daily      02/13/20 0927     Place sequential compression device  Until discontinued      02/08/20 0233     IP VTE HIGH RISK PATIENT  Once      02/07/20 1915                      Chani Mckenzie MD  Department of Hospital Medicine   [facility]

## 2020-02-21 NOTE — PLAN OF CARE
Returned phone call to daughter Jeannine. She states that she does not want her mom to fo to [facility]. Requesting list of SNF facilities. List to be left at bedside for daughter.

## 2020-02-21 NOTE — ASSESSMENT & PLAN NOTE
- Medically ready to go to SNF  Follow up OP with GI and ID on 3/20 with repeat CT AP with contrast   - weeklyCBC and CMP ESR CRP sent to ID clinic at  attn to Dr. Snyder

## 2020-02-21 NOTE — SUBJECTIVE & OBJECTIVE
Past Medical History:   Diagnosis Date    Brain aneurysm     Cancer     thyroid    Diabetes mellitus     Diabetic neuropathy     Hypertension     Hypothyroid     Stroke     Tobacco abuse        Past Surgical History:   Procedure Laterality Date    Breast screening      FETOSCOPIC LASER OF POSTERIOR URETHRAL VALVE W/ SHUNT PLACEMENT      OTHER SURGICAL HISTORY      stent in brain     THYROID SURGERY      TUBAL LIGATION         Review of patient's allergies indicates:   Allergen Reactions    Lisinopril Swelling    Penicillins        No current facility-administered medications on file prior to encounter.      Current Outpatient Medications on File Prior to Encounter   Medication Sig    amLODIPine (NORVASC) 10 MG tablet Take 10 mg by mouth once daily.    conjugated estrogens (PREMARIN) vaginal cream Place vaginally once daily.    glimepiride (AMARYL) 4 MG tablet Take 4 mg by mouth before breakfast.    hydroCHLOROthiazide (HYDRODIURIL) 12.5 MG Tab Take 12.5 mg by mouth once daily.    levothyroxine (SYNTHROID) 200 MCG tablet Take 200 mcg by mouth before breakfast.     Family History     Problem Relation (Age of Onset)    Diabetes Mother    HIV Son    Heart disease Mother, Son    Hypertension Mother        Tobacco Use    Smoking status: Former Smoker     Packs/day: 1.00     Types: Cigarettes     Last attempt to quit: 2019     Years since quittin.6    Tobacco comment: Many years   Substance and Sexual Activity    Alcohol use: No    Drug use: No    Sexual activity: Not on file     Review of Systems   Constitutional: Negative for activity change, chills, diaphoresis and fever.   Respiratory: Negative for cough, shortness of breath and wheezing.    Cardiovascular: Negative for chest pain.   Gastrointestinal: Negative for abdominal pain, constipation, diarrhea, nausea and vomiting.   Genitourinary: Negative for dysuria, frequency and urgency.   Neurological: Negative for dizziness.    Hematological: Does not bruise/bleed easily.     Objective:     Vital Signs (Most Recent):  Temp: 98.1 °F (36.7 °C) (02/21/20 0801)  Pulse: 84 (02/21/20 0801)  Resp: 16 (02/21/20 0801)  BP: 122/70 (02/21/20 0801)  SpO2: 96 % (02/21/20 0801) Vital Signs (24h Range):  Temp:  [97.6 °F (36.4 °C)-98.7 °F (37.1 °C)] 98.1 °F (36.7 °C)  Pulse:  [84-89] 84  Resp:  [16-20] 16  SpO2:  [92 %-96 %] 96 %  BP: (120-132)/(64-70) 122/70     Weight: 77.6 kg (171 lb 1.2 oz)  Body mass index is 28.47 kg/m².    Physical Exam   Constitutional: She appears well-developed and well-nourished.   HENT:   Head: Normocephalic and atraumatic.   Eyes: Pupils are equal, round, and reactive to light. EOM are normal.   Neck: Normal range of motion. No tracheal deviation present.   Cardiovascular: Normal heart sounds.   No murmur heard.  Pulmonary/Chest: Effort normal. She has no wheezes. She has no rales.   Abdominal: Soft. Bowel sounds are normal. There is no tenderness.   Percutaneous drain   Musculoskeletal: Normal range of motion. She exhibits no edema.   Neurological: She is alert.   Skin: Skin is warm and dry.         CRANIAL NERVES     CN III, IV, VI   Pupils are equal, round, and reactive to light.  Extraocular motions are normal.        Significant Labs: All pertinent labs within the past 24 hours have been reviewed.    Significant Imaging: I have reviewed all pertinent imaging results/findings within the past 24 hours.

## 2020-02-21 NOTE — PT/OT/SLP PROGRESS
Physical Therapy       [id number]     Pt not treated on this date but remains appropriate for current POC. Therapy will follow up as able.     Jonathan Gutierres, PT, DPT  2/21/2020

## 2020-02-21 NOTE — PT/OT/SLP PROGRESS
Occupational Therapy   Treatment    Name: Jeni Chacon  MRN: [id number]  Admitting Diagnosis:  Abscess of sigmoid colon       Recommendations:     Discharge Recommendations: nursing facility, skilled  Discharge Equipment Recommendations:  (TBD pending progress)  Barriers to discharge:  (increased level of assistance at this time. )    Assessment:     Jeni Chacon is a 74 y.o. female with a medical diagnosis of Abscess of sigmoid colon.  Performance deficits affecting function are weakness, impaired endurance, impaired self care skills, impaired functional mobilty, gait instability, impaired balance, decreased lower extremity function, impaired cognition, decreased safety awareness, decreased coordination, impaired coordination, impaired cardiopulmonary response to activity, decreased upper extremity function. Patient tolerated treatment session and was motivated to participate in therapy. Still recommending SNF to ensure safety, reduce burden of care, and maximize functional independence prior to discharge home.      Rehab Prognosis:  Good; patient would benefit from acute skilled OT services to address these deficits and reach maximum level of function.       Plan:     Patient to be seen 3 x/week to address the above listed problems via self-care/home management, therapeutic activities, therapeutic exercises, cognitive retraining, neuromuscular re-education  · Plan of Care Expires: 03/09/20  · Plan of Care Reviewed with: patient    Subjective     Pain/Comfort:  · Pain Rating 1: 0/10  · Pain Rating Post-Intervention 1: 0/10    Objective:     Communicated with: patient prior to session.  Patient found HOB elevated with telemetry, peripheral IV, oxygen, PureWick(ostomy bag) upon OT entry to room.    General Precautions: Standard, aspiration, fall, dental soft   Orthopedic Precautions:N/A   Braces:  N/A    Occupational Performance:     Bed Mobility:    · Patient completed Supine to Sit with minimum assistance  · Patient  completed Sit to Supine with minimum assistance     Functional Mobility/Transfers:  · Patient completed Sit <> Stand Transfer with minimum assistance  with  hand-held assist   · Functional Mobility: Patient sidestepped to HOB with min A, but needed verbal and tactile cues to advance LEs.     Activities of Daily Living:  · Upper Body Dressing: minimum assistance Donning back gown sitting EOB  · Toileting: total assistance        AMPA 6 Click ADL: 16    Treatment & Education:  Role of OT and POC  Safety  ADL retraining  Functional mobility training  Importance of OOB activity    Importance of performing ROM exercises and benefits: Patient performed R AAROM/L AROM exercises (2 x 10) in all available pain free planes and joints focusing to improve strength and endurance to increase independence with ADLsl.    Patient left HOB elevated with call button in reach and all needs met (AVASYS camera on and e-sitter notified with patient in view)Education:      GOALS:   Multidisciplinary Problems     Occupational Therapy Goals        Problem: Occupational Therapy Goal    Goal Priority Disciplines Outcome Interventions   Occupational Therapy Goal     OT, PT/OT Ongoing, Progressing    Description:  Pt will complete GH tasks with stand by assistance Met  Pt will perform bed mobility with stand by assist  Pt will complete UB dressing with contact guard assist  Pt will follow simple commands 2/3 trials for GH task  Pt will sit edge of bed for >10 minutes for functional task with contact guard assist Met    Pt will be independent and perform 2 x 10 B UE ROM exercises.                    Time Tracking:     OT Date of Treatment: 02/21/20  OT Start Time: 1318  OT Stop Time: 1341  OT Total Time (min): 23 min    Billable Minutes:Therapeutic Activity 10  Therapeutic Exercise 13    NADEEN Hughes  2/21/2020

## 2020-02-21 NOTE — PLAN OF CARE
[facility]     Department of Hospital Medicine     [address] [place] [address]     [phone] after hours  [phone] fax       NURSING HOME ORDERS    02/21/2020    Admit to Nursing Home:     Skilled Bed                                                Diagnoses:  Active Hospital Problems    Diagnosis  POA    *Abscess of sigmoid colon [K63.0]  Yes    Hypokalemia [E87.6]  Unknown    Essential hypertension [I10]  Yes    Hypothyroid [E03.9]  Yes    Type 2 diabetes mellitus, without long-term current use of insulin [E11.9]  Yes    Cholecystitis [K81.9]  Yes    Overweight (BMI 25.0-29.9) [E66.3]  Yes    Discharge planning issues [Z02.9]  Not Applicable    MSSA bacteremia [R78.81]  Yes    COPD (chronic obstructive pulmonary disease) [J44.9]  Yes    Hematuria [R31.9]  Yes    Blood loss anemia [D50.0]  Yes    Respiratory failure with hypoxia [J96.91]  Yes    Paroxysmal A-fib [I48.0]  Yes      Resolved Hospital Problems   No resolved problems to display.       Patient is homebound due to:  Abscess of sigmoid colon    Allergies:  Review of patient's allergies indicates:   Allergen Reactions    Lisinopril Swelling    Penicillins        Vitals:           Every shift (Skilled Nursing patients)    Diet: diabetic           Acitivities:       - Scheduled walks once each shift (every 8 hours)      LABS:  Per facility protocol     - FU in 4 weeks in ID clinic with CT abd with contrast to assess fluid collection  - weekly CBC and CMP ESR CRP sent to ID clinic at  attn to Dr. Snyder     Nursing Precautions:              - Fall precautions per nursing home protocol   - Seizure precaution per group home protocol   - Decubitus precautions:        -  for positioning   - Pressure reducing foam mattress   - Turn patient every two hours. Use wedge pillows to anchor patient    CONSULTS:     Physical Therapy to evaluate and treat     Occupational  Therapy to evaluate and treat     Nutrition to evaluate and recommend diet      MISCELLANEOUS CARE:           Mena Care: Empty Mena bag every shift.  Change Mena every month     Routine Skin for Bedridden Patients:  Apply moisture barrier cream to all    skin folds and wet areas in perineal area daily and after baths and                           all bowel movements.     Cholecystostomy Tube: drain to remain in place until patient follows up with surgery outpatient           DIABETES CARE:     Check blood sugar:         Fingerstick blood sugar AC and HS         Report CBG < 60 or > 400 to physician.                                          Insulin Sliding Scale          Glucose  Novolog Insulin Subcutaneous        0 - 60   Orange juice or glucose tablet, hold insulin      No insulin   201-250  2 units   251-300  4 units   301-350  6 units   351-400  8 units   >400   10 units then call physician      Medications: Discontinue all previous medication orders, if any. See new list below.     Jeni Chacon   Home Medication Instructions ROSALBA:[id number]    Printed on:02/21/20 0636   Medication Information                      amLODIPine (NORVASC) 10 MG tablet  Take 10 mg by mouth once daily.             apixaban (ELIQUIS) 5 mg Tab  Take 1 tablet (5 mg total) by mouth 2 (two) times daily.             ciprofloxacin HCl (CIPRO) 500 MG tablet  Take 1 tablet (500 mg total) by mouth every 12 (twelve) hours.  End date: 3/20/2020           conjugated estrogens (PREMARIN) vaginal cream  Place vaginally once daily.             glimepiride (AMARYL) 4 MG tablet  Take 4 mg by mouth before breakfast.             hydroCHLOROthiazide (HYDRODIURIL) 12.5 MG Tab  Take 12.5 mg by mouth once daily.             levothyroxine (SYNTHROID) 200 MCG tablet  Take 200 mcg by mouth before breakfast.             metoprolol succinate (TOPROL-XL) 100 MG 24 hr tablet  Take 1 tablet (100 mg total) by mouth once daily.             metroNIDAZOLE  (FLAGYL) 500 MG tablet  Take 1 tablet (500 mg total) by mouth every 8 (eight) hours.  End date: 3/20/2020                     _________________________________  Chani Mckenzie MD  02/21/2020

## 2020-02-21 NOTE — PLAN OF CARE
02/21/20 0852   Post-Acute Status   Post-Acute Authorization Placement   Post-Acute Placement Status Referrals Sent     SNF orders sent, waiting insurance auth. Sw to follow with dg and left list of denied facilities. Sw to follow.

## 2020-02-22 LAB
POCT GLUCOSE: 127 MG/DL (ref 70–110)
POCT GLUCOSE: 152 MG/DL (ref 70–110)
POCT GLUCOSE: 193 MG/DL (ref 70–110)
POCT GLUCOSE: 94 MG/DL (ref 70–110)

## 2020-02-22 PROCEDURE — 86580 TB INTRADERMAL TEST: CPT | Performed by: INTERNAL MEDICINE

## 2020-02-22 PROCEDURE — 25000003 PHARM REV CODE 250: Performed by: STUDENT IN AN ORGANIZED HEALTH CARE EDUCATION/TRAINING PROGRAM

## 2020-02-22 PROCEDURE — 25000003 PHARM REV CODE 250: Performed by: INTERNAL MEDICINE

## 2020-02-22 PROCEDURE — 99232 SBSQ HOSP IP/OBS MODERATE 35: CPT | Mod: ,,, | Performed by: INTERNAL MEDICINE

## 2020-02-22 PROCEDURE — 11000001 HC ACUTE MED/SURG PRIVATE ROOM

## 2020-02-22 PROCEDURE — 30200315 PPD INTRADERMAL TEST REV CODE 302: Performed by: INTERNAL MEDICINE

## 2020-02-22 PROCEDURE — 99232 PR SUBSEQUENT HOSPITAL CARE,LEVL II: ICD-10-PCS | Mod: ,,, | Performed by: INTERNAL MEDICINE

## 2020-02-22 PROCEDURE — 27000221 HC OXYGEN, UP TO 24 HOURS

## 2020-02-22 RX ADMIN — APIXABAN 5 MG: 5 TABLET, FILM COATED ORAL at 09:02

## 2020-02-22 RX ADMIN — CIPROFLOXACIN HYDROCHLORIDE 500 MG: 500 TABLET, FILM COATED ORAL at 11:02

## 2020-02-22 RX ADMIN — INSULIN ASPART 4 UNITS: 100 INJECTION, SOLUTION INTRAVENOUS; SUBCUTANEOUS at 11:02

## 2020-02-22 RX ADMIN — APIXABAN 5 MG: 5 TABLET, FILM COATED ORAL at 11:02

## 2020-02-22 RX ADMIN — METRONIDAZOLE 500 MG: 500 TABLET ORAL at 04:02

## 2020-02-22 RX ADMIN — POLYETHYLENE GLYCOL 3350 17 G: 17 POWDER, FOR SOLUTION ORAL at 11:02

## 2020-02-22 RX ADMIN — INSULIN ASPART 4 UNITS: 100 INJECTION, SOLUTION INTRAVENOUS; SUBCUTANEOUS at 02:02

## 2020-02-22 RX ADMIN — INSULIN ASPART 4 UNITS: 100 INJECTION, SOLUTION INTRAVENOUS; SUBCUTANEOUS at 06:02

## 2020-02-22 RX ADMIN — METRONIDAZOLE 500 MG: 500 TABLET ORAL at 10:02

## 2020-02-22 RX ADMIN — CIPROFLOXACIN HYDROCHLORIDE 500 MG: 500 TABLET, FILM COATED ORAL at 09:02

## 2020-02-22 RX ADMIN — INSULIN DETEMIR 5 UNITS: 100 INJECTION, SOLUTION SUBCUTANEOUS at 10:02

## 2020-02-22 RX ADMIN — TUBERCULIN PURIFIED PROTEIN DERIVATIVE 5 UNITS: 5 INJECTION, SOLUTION INTRADERMAL at 12:02

## 2020-02-22 RX ADMIN — METOPROLOL SUCCINATE 100 MG: 100 TABLET, EXTENDED RELEASE ORAL at 11:02

## 2020-02-22 RX ADMIN — DOCUSATE SODIUM - SENNOSIDES 1 TABLET: 50; 8.6 TABLET, FILM COATED ORAL at 11:02

## 2020-02-22 RX ADMIN — AMLODIPINE BESYLATE 10 MG: 10 TABLET ORAL at 11:02

## 2020-02-22 NOTE — PROGRESS NOTES
[facility] Hospital Medicine  Progress Note    Patient Name: Jeni Chacon  MRN: [id number]  Patient Class: IP- Inpatient   Admission Date: 2/7/2020  Length of Stay: 15 days  Attending Physician: Tiara Killian MD  Primary Care Provider: [facility] Doctor [facility] Medicine Team: [facility] MED 2 Chani Mckenzie MD    Subjective:     Principal Problem:Abscess of sigmoid colon        HPI:  Patient is a 74 year old female with medical history significant for hypothyroidism, HTN, DMII with neuropathy, tobacco abuse (with no diagnosis of COPD), and history of CVA s/p  shunt who initially presented to [facility] with complaints of SOB and abdominal pain. Although patient is oriented to person, time, and place, she still is having some odd behavior. So I gathered her history from her, her medical chart, and from calling her daughter. She reports that late January she had been having abdominal pain with associated SOB for a few weeks; despite a heavy smoking history, family reports that she has never had SOB or a diagnosis of COPD. She reports no alleviating or aggravating factors, just that the pain was sharp and diffuse.   She was admitted on 1/19 and had a complicated hospital course. Briefly, patient underwent CT abdomen on 1/20 which showed evidence of cholecystitis as well as an sigmoid colon abscess. She had a percutaneous cholecystostomy tube placed on 1/24. The abscess was not drained as IR felt it was unsafe to do so. During her hospital course, her blood cultures were positive for MSSA and she had evidence of UTI. Her antibiotics were changed a few times, but she currently is on cipro and flagyl for intraabdominal infection prophylaxis and on vanc and rifampin for MSSA bacteremia. KESHIA showed inferobasal hypokinesis and EF of 55%; no vegetation noted.  In addition to this she had O2 saturation in the mid-80s. Initially she was placed on bipap and treated for COPD exacerbation; she  completed a course of steroids and got one dose of Lasix. However her respiratory status worsening and she was placed in MICU and required intubation from 1/20-1/31. She was Bipap dependent following extubation until 2/6 and is now on nasal cannula; of note, she did require pressors during MICU stay. On top of all this, patient also developed paroxysmal a fib. CHADSVASC elevated however she could not be anticoagulated due to gross hematuria requiring blood transfusion. Patient transferred for second IR opinion regarding drainage of sigmoid abscess and out of confusion and frustration from the multiple consultants at [facility].     At the time of my exam, patient was tachycardic, tachypneic, and mildly hypertension. She was AAO x3, however denied all symptoms on review of systems despite appear to have increased work of breathing. She reports that this is her baseline breathing and that she is on 3 L of O2 at home, however she exhibits odd behavior like trying to squirm out of the bed, or being unable to sign blood consent because it does not look right. Of note, patient was transferred with PICC line, however per chart review, it appears that her bacteremia resulted from cultures on 1/20, and the first vanc trough was taken on 1/23. Follow up culture have been negative and patient reports that she got her PICC line more recently. In addition, patient was transferred with NG tube. She reports that she is able to swallow and that the last time she ate was yesterday, however per nursing hand off, patient was to get formal speech eval which was not done due to transfer.      Overview/Hospital Course:  Patient admitted with sigmoid abscess for IR drainage, evaluated by IR and per repeat imaging, size reduced and no intervention. Also came with a percutaneous cholecystostomy tube placed for acute cholecystitis.   She is also evaluated by ID and continuing Vancomycin, Ciprofloxacin and Flagyl for 2 weeks and repeat CT  AP (2/20). Speech evaluated, to continue dysphagia diet.    Interval History: NAEON. Patient awaiting discharge to [facility] monday    Review of Systems   Constitutional: Negative for activity change, chills, diaphoresis and fever.   Respiratory: Negative for cough, shortness of breath and wheezing.    Cardiovascular: Negative for chest pain.   Gastrointestinal: Negative for abdominal pain, constipation, diarrhea, nausea and vomiting.   Genitourinary: Negative for dysuria, frequency and urgency.   Neurological: Negative for dizziness.   Hematological: Does not bruise/bleed easily.     Objective:     Vital Signs (Most Recent):  Temp: 98.8 °F (37.1 °C) (02/22/20 1310)  Pulse: 81 (02/22/20 1310)  Resp: 18 (02/22/20 1310)  BP: 117/76 (02/22/20 1310)  SpO2: (!) 90 % (02/22/20 1310) Vital Signs (24h Range):  Temp:  [97 °F (36.1 °C)-98.8 °F (37.1 °C)] 98.8 °F (37.1 °C)  Pulse:  [60-87] 81  Resp:  [14-24] 18  SpO2:  [90 %-93 %] 90 %  BP: (109-130)/(56-76) 117/76     Weight: 77.6 kg (171 lb 1.2 oz)  Body mass index is 28.47 kg/m².    Intake/Output Summary (Last 24 hours) at 2/22/2020 1343  Last data filed at 2/22/2020 0500  Gross per 24 hour   Intake --   Output 0 ml   Net 0 ml      Physical Exam   Constitutional: She appears well-developed and well-nourished.   HENT:   Head: Normocephalic and atraumatic.   Eyes: Pupils are equal, round, and reactive to light. EOM are normal.   Neck: Normal range of motion. No tracheal deviation present.   Cardiovascular: Normal heart sounds.   No murmur heard.  Pulmonary/Chest: Effort normal. She has no wheezes. She has no rales.   Abdominal: Soft. Bowel sounds are normal. There is no tenderness.   Percutaneous drain   Musculoskeletal: Normal range of motion. She exhibits no edema.   Neurological: She is alert.   Skin: Skin is warm and dry.       Significant Labs: All pertinent labs within the past 24 hours have been reviewed.    Significant Imaging: I have reviewed all pertinent imaging  results/findings within the past 24 hours.      Assessment/Plan:      * Abscess of sigmoid colon  - Patient with evidence of  an abscess of 3.5 x 5 x 5.3 cm insinuated between the sigmoid colon and the dome of the urinary bladder, possibly in the setting of sigmoid diverticulitis  - Not drained by IR at the time as it was deemed unsafe to do so  - Family wanted transfer for IR second opinion.   - Evaluated by IR, decreased size of the sigmoid collection, intervention deferred for now  - Blood cultures x2 collected, per outside hospital records, blood cultures have been clear since 1/20 ( See MSSA bacteremia)  - Continue Cipro and flagyl until 3/20      Hypokalemia  -replete prn      Dysphagia  -diet: pureed w/ nectar thick liquids        Paroxysmal A-fib  - Patient with reports of new onset paroxysmal afib during course of hospital stay  - Appears to be in sinus rhythm at the time of initial exam  - Continue metoprolol. Patient was getting 25 mg QID prior to transfer. Unclear if this was out of caution for hypotension or if increased frequency was needed for continued rate control. Will keep at this dose for now as patient may have received some doses prior to transfer. Consider switching to metoprolol succinate in the morning.   - Patient not anticoagulated at this time due to significant hematuria prior to transfer  * [id number] Score for Atrial Fibrillation Stroke Ris  RESULT SUMMARY:  7 points  Stroke risk was 11.2% per year in >90,000 patients (the [place] Atrial Fibrillation Cohort Study) and 15.7% risk of stroke/TIA/systemic embolism  INPUTS:  Age --> 1 = 65-74  Sex --> 1 = Female  CHF history --> 0 = No  Hypertension history --> 1 = Yes  Stroke/TIA/thromboembolism history --> 2 = Yes  Vascular disease history (prior MI, peripheral artery disease, or aortic plaque) --> 1 = Yes  Diabetes history --> 1 = Yes  -on apixaban 5mg BID          Hematuria  Blood loss anemia    - Patient with complicated hospital  course including significant hematuria leading to blood loss anemia and need for blood transfusion.   - Hg stable at 8.5 for now. Patient has purewick, no signs of gross hematuria. INR 1.1  - type and screen ordered, blood consent signed  - Will keep cbc at daily frequency for now. If Hg with significant drop in the morning, consider increasing frequency and transfuse for Hg<7  - in terms of anticoagulation (see paroxysmal afib), hold for now. If Hg remains stable, consider starting anticoagulation        COPD (chronic obstructive pulmonary disease)  Respiratory failure with hypoxia  Respiratory distress    - Patient treated for COPD exacerbation with full course of steroids and breathing treatments prior to transfer  - Patient does not have formal diagnosis of COPD but reports heavy smoking history at a pack a day for many years  - Patient also reports using 3 L O2 at home, but daughter is not sure. Unspecified chronicity of hypoxic respiratory failure  - Will continue breathing treatment q6 and incentive spirometry  - Will follow up vbg as patient has apparent increased work of breathing.  - Patient is not on any inhalers at home, she will likely need rescue inhaler and inhaled corticosteroid at minimum at discharge       MSSA bacteremia  - Patient with evidence of MSSA bacteremia on blood cultures taken on 1/20. First vanc trough drawn on 1/23  - Sensitivities show that staph aureus is sensitive to cefazolin and oxacillin. However patient is still on vanc with documented allergy to penicillins. However patient is unclear as to what reaction she gets and daughter is unclear as well  - In addition, it appears that patient would have received full course of antibiotic for bacteremia as no further blood cultures were positive for MSSA after 1/20 cultures  - Blood cultures repeated  - Evaluated by ID appreciate recs, vancomycin course completed  Off note  shunt was tapped by [facility]DE[facility] in OSH on 1/25 with negative  studies  Discussed with NSGY, as patient does not have meningitis,  shunt is not the source of MSSA and if menigitis suspected to first evaluate with LP.         Discharge planning issues  - Medically ready to go to SNF  Follow up OP with GI and ID on 3/20 with repeat CT AP with contrast   - weeklyCBC and CMP ESR CRP sent to ID clinic at  attn to Dr. Snyder         Cholecystitis  - Ct scan done on 1/20 showed gallbladder to be markedly distended with sludge and stones, and with an indistinct wall. There was some pericholecystic inflammation in the posterior part. She had a percutaneous cholecystostomy tube placed on 1/24 for the acute cholecystitis  - Drain still in place  - Will continue cipro flagyl       Type 2 diabetes mellitus, without long-term current use of insulin  - HA1c 6.9    Diet - Dental soft/dysphagia    - Insulin Detemir 5 daily  - Insulin Aspart 4 TID  - SSI     POCT Glucose - Before meals and QHS  Goal BG - 140-180              Hypothyroid  - Continue home levothyroxine.  - 2/7 thyroid labs show TSH elevated at 32, but T4 wnl of 1.05    Essential hypertension  - Patient takes amlodipine and hydrochlorothiazide at home  - metoprolol started for rate control for new onset afib prior to transfer  - Will resume metoprolol and amlodipine as patient is mildly hypertensive  - Resume hydrochlorothiazide as needed        VTE Risk Mitigation (From admission, onward)         Ordered     apixaban tablet 5 mg  2 times daily      02/13/20 0927     Place sequential compression device  Until discontinued      02/08/20 0233     IP VTE HIGH RISK PATIENT  Once      02/07/20 1915                      Chani Mckenzie MD  Department of Hospital Medicine   [facility]

## 2020-02-22 NOTE — SUBJECTIVE & OBJECTIVE
Interval History: NAEON. Patient awaiting discharge to [facility] monday    Review of Systems   Constitutional: Negative for activity change, chills, diaphoresis and fever.   Respiratory: Negative for cough, shortness of breath and wheezing.    Cardiovascular: Negative for chest pain.   Gastrointestinal: Negative for abdominal pain, constipation, diarrhea, nausea and vomiting.   Genitourinary: Negative for dysuria, frequency and urgency.   Neurological: Negative for dizziness.   Hematological: Does not bruise/bleed easily.     Objective:     Vital Signs (Most Recent):  Temp: 98.8 °F (37.1 °C) (02/22/20 1310)  Pulse: 81 (02/22/20 1310)  Resp: 18 (02/22/20 1310)  BP: 117/76 (02/22/20 1310)  SpO2: (!) 90 % (02/22/20 1310) Vital Signs (24h Range):  Temp:  [97 °F (36.1 °C)-98.8 °F (37.1 °C)] 98.8 °F (37.1 °C)  Pulse:  [60-87] 81  Resp:  [14-24] 18  SpO2:  [90 %-93 %] 90 %  BP: (109-130)/(56-76) 117/76     Weight: 77.6 kg (171 lb 1.2 oz)  Body mass index is 28.47 kg/m².    Intake/Output Summary (Last 24 hours) at 2/22/2020 1343  Last data filed at 2/22/2020 0500  Gross per 24 hour   Intake --   Output 0 ml   Net 0 ml      Physical Exam   Constitutional: She appears well-developed and well-nourished.   HENT:   Head: Normocephalic and atraumatic.   Eyes: Pupils are equal, round, and reactive to light. EOM are normal.   Neck: Normal range of motion. No tracheal deviation present.   Cardiovascular: Normal heart sounds.   No murmur heard.  Pulmonary/Chest: Effort normal. She has no wheezes. She has no rales.   Abdominal: Soft. Bowel sounds are normal. There is no tenderness.   Percutaneous drain   Musculoskeletal: Normal range of motion. She exhibits no edema.   Neurological: She is alert.   Skin: Skin is warm and dry.       Significant Labs: All pertinent labs within the past 24 hours have been reviewed.    Significant Imaging: I have reviewed all pertinent imaging results/findings within the past 24 hours.

## 2020-02-23 LAB
ANION GAP SERPL CALC-SCNC: 8 MMOL/L (ref 8–16)
BASOPHILS # BLD AUTO: 0.02 K/UL (ref 0–0.2)
BASOPHILS NFR BLD: 0.3 % (ref 0–1.9)
BUN SERPL-MCNC: 5 MG/DL (ref 8–23)
CALCIUM SERPL-MCNC: 8.5 MG/DL (ref 8.7–10.5)
CHLORIDE SERPL-SCNC: 103 MMOL/L (ref 95–110)
CO2 SERPL-SCNC: 25 MMOL/L (ref 23–29)
CREAT SERPL-MCNC: 0.8 MG/DL (ref 0.5–1.4)
DIFFERENTIAL METHOD: ABNORMAL
EOSINOPHIL # BLD AUTO: 0.2 K/UL (ref 0–0.5)
EOSINOPHIL NFR BLD: 3 % (ref 0–8)
ERYTHROCYTE [DISTWIDTH] IN BLOOD BY AUTOMATED COUNT: 18.6 % (ref 11.5–14.5)
EST. GFR  (AFRICAN AMERICAN): >60 ML/MIN/1.73 M^2
EST. GFR  (NON AFRICAN AMERICAN): >60 ML/MIN/1.73 M^2
GLUCOSE SERPL-MCNC: 144 MG/DL (ref 70–110)
HCT VFR BLD AUTO: 31.3 % (ref 37–48.5)
HGB BLD-MCNC: 9.6 G/DL (ref 12–16)
IMM GRANULOCYTES # BLD AUTO: 0.02 K/UL (ref 0–0.04)
IMM GRANULOCYTES NFR BLD AUTO: 0.3 % (ref 0–0.5)
LYMPHOCYTES # BLD AUTO: 1.3 K/UL (ref 1–4.8)
LYMPHOCYTES NFR BLD: 21.6 % (ref 18–48)
MCH RBC QN AUTO: 27.4 PG (ref 27–31)
MCHC RBC AUTO-ENTMCNC: 30.7 G/DL (ref 32–36)
MCV RBC AUTO: 89 FL (ref 82–98)
MONOCYTES # BLD AUTO: 0.7 K/UL (ref 0.3–1)
MONOCYTES NFR BLD: 11.4 % (ref 4–15)
NEUTROPHILS # BLD AUTO: 3.8 K/UL (ref 1.8–7.7)
NEUTROPHILS NFR BLD: 63.4 % (ref 38–73)
NRBC BLD-RTO: 0 /100 WBC
PLATELET # BLD AUTO: 466 K/UL (ref 150–350)
PMV BLD AUTO: 8.9 FL (ref 9.2–12.9)
POCT GLUCOSE: 133 MG/DL (ref 70–110)
POCT GLUCOSE: 157 MG/DL (ref 70–110)
POCT GLUCOSE: 172 MG/DL (ref 70–110)
POCT GLUCOSE: 190 MG/DL (ref 70–110)
POTASSIUM SERPL-SCNC: 3.7 MMOL/L (ref 3.5–5.1)
RBC # BLD AUTO: 3.5 M/UL (ref 4–5.4)
SODIUM SERPL-SCNC: 136 MMOL/L (ref 136–145)
WBC # BLD AUTO: 6.06 K/UL (ref 3.9–12.7)

## 2020-02-23 PROCEDURE — 25000003 PHARM REV CODE 250: Performed by: STUDENT IN AN ORGANIZED HEALTH CARE EDUCATION/TRAINING PROGRAM

## 2020-02-23 PROCEDURE — 99232 SBSQ HOSP IP/OBS MODERATE 35: CPT | Mod: ,,, | Performed by: INTERNAL MEDICINE

## 2020-02-23 PROCEDURE — 11000001 HC ACUTE MED/SURG PRIVATE ROOM

## 2020-02-23 PROCEDURE — 99232 PR SUBSEQUENT HOSPITAL CARE,LEVL II: ICD-10-PCS | Mod: ,,, | Performed by: INTERNAL MEDICINE

## 2020-02-23 PROCEDURE — 25000003 PHARM REV CODE 250: Performed by: INTERNAL MEDICINE

## 2020-02-23 PROCEDURE — 80048 BASIC METABOLIC PNL TOTAL CA: CPT

## 2020-02-23 PROCEDURE — C9399 UNCLASSIFIED DRUGS OR BIOLOG: HCPCS | Performed by: HOSPITALIST

## 2020-02-23 PROCEDURE — 36415 COLL VENOUS BLD VENIPUNCTURE: CPT

## 2020-02-23 PROCEDURE — 63600175 PHARM REV CODE 636 W HCPCS: Performed by: HOSPITALIST

## 2020-02-23 PROCEDURE — 85025 COMPLETE CBC W/AUTO DIFF WBC: CPT

## 2020-02-23 RX ADMIN — AMLODIPINE BESYLATE 10 MG: 10 TABLET ORAL at 10:02

## 2020-02-23 RX ADMIN — METRONIDAZOLE 500 MG: 500 TABLET ORAL at 10:02

## 2020-02-23 RX ADMIN — METRONIDAZOLE 500 MG: 500 TABLET ORAL at 02:02

## 2020-02-23 RX ADMIN — INSULIN DETEMIR 5 UNITS: 100 INJECTION, SOLUTION SUBCUTANEOUS at 10:02

## 2020-02-23 RX ADMIN — INSULIN ASPART 4 UNITS: 100 INJECTION, SOLUTION INTRAVENOUS; SUBCUTANEOUS at 10:02

## 2020-02-23 RX ADMIN — METOPROLOL SUCCINATE 100 MG: 100 TABLET, EXTENDED RELEASE ORAL at 10:02

## 2020-02-23 RX ADMIN — DOCUSATE SODIUM - SENNOSIDES 1 TABLET: 50; 8.6 TABLET, FILM COATED ORAL at 10:02

## 2020-02-23 RX ADMIN — CIPROFLOXACIN HYDROCHLORIDE 500 MG: 500 TABLET, FILM COATED ORAL at 09:02

## 2020-02-23 RX ADMIN — METRONIDAZOLE 500 MG: 500 TABLET ORAL at 06:02

## 2020-02-23 RX ADMIN — LEVOTHYROXINE SODIUM 200 MCG: 100 TABLET ORAL at 06:02

## 2020-02-23 RX ADMIN — INSULIN ASPART 4 UNITS: 100 INJECTION, SOLUTION INTRAVENOUS; SUBCUTANEOUS at 02:02

## 2020-02-23 RX ADMIN — ACETAMINOPHEN 650 MG: 325 TABLET ORAL at 10:02

## 2020-02-23 RX ADMIN — POLYETHYLENE GLYCOL 3350 17 G: 17 POWDER, FOR SOLUTION ORAL at 10:02

## 2020-02-23 RX ADMIN — INSULIN ASPART 4 UNITS: 100 INJECTION, SOLUTION INTRAVENOUS; SUBCUTANEOUS at 06:02

## 2020-02-23 RX ADMIN — APIXABAN 5 MG: 5 TABLET, FILM COATED ORAL at 10:02

## 2020-02-23 RX ADMIN — APIXABAN 5 MG: 5 TABLET, FILM COATED ORAL at 09:02

## 2020-02-23 RX ADMIN — CIPROFLOXACIN HYDROCHLORIDE 500 MG: 500 TABLET, FILM COATED ORAL at 10:02

## 2020-02-23 NOTE — PROGRESS NOTES
[facility] Hospital Medicine  Progress Note    Patient Name: Jeni Chacon  MRN: [id number]  Patient Class: IP- Inpatient   Admission Date: 2/7/2020  Length of Stay: 16 days  Attending Physician: Tiara Killian MD  Primary Care Provider: [facility] Medicine Team: [facility]OSP MED 2 Chani Mckenzie MD    Subjective:     Principal Problem:Abscess of sigmoid colon        HPI:  Patient is a 74 year old female with medical history significant for hypothyroidism, HTN, DMII with neuropathy, tobacco abuse (with no diagnosis of COPD), and history of CVA s/p  shunt who initially presented to [facility] with complaints of SOB and abdominal pain. Although patient is oriented to person, time, and place, she still is having some odd behavior. So I gathered her history from her, her medical chart, and from calling her daughter. She reports that late January she had been having abdominal pain with associated SOB for a few weeks; despite a heavy smoking history, family reports that she has never had SOB or a diagnosis of COPD. She reports no alleviating or aggravating factors, just that the pain was sharp and diffuse.   She was admitted on 1/19 and had a complicated hospital course. Briefly, patient underwent CT abdomen on 1/20 which showed evidence of cholecystitis as well as an sigmoid colon abscess. She had a percutaneous cholecystostomy tube placed on 1/24. The abscess was not drained as IR felt it was unsafe to do so. During her hospital course, her blood cultures were positive for MSSA and she had evidence of UTI. Her antibiotics were changed a few times, but she currently is on cipro and flagyl for intraabdominal infection prophylaxis and on vanc and rifampin for MSSA bacteremia. KESHIA showed inferobasal hypokinesis and EF of 55%; no vegetation noted.  In addition to this she had O2 saturation in the mid-80s. Initially she was placed on bipap and treated for COPD exacerbation; she  completed a course of steroids and got one dose of Lasix. However her respiratory status worsening and she was placed in MICU and required intubation from 1/20-1/31. She was Bipap dependent following extubation until 2/6 and is now on nasal cannula; of note, she did require pressors during MICU stay. On top of all this, patient also developed paroxysmal a fib. CHADSVASC elevated however she could not be anticoagulated due to gross hematuria requiring blood transfusion. Patient transferred for second IR opinion regarding drainage of sigmoid abscess and out of confusion and frustration from the multiple consultants at [place].     At the time of my exam, patient was tachycardic, tachypneic, and mildly hypertension. She was AAO x3, however denied all symptoms on review of systems despite appear to have increased work of breathing. She reports that this is her baseline breathing and that she is on 3 L of O2 at home, however she exhibits odd behavior like trying to squirm out of the bed, or being unable to sign blood consent because it does not look right. Of note, patient was transferred with PICC line, however per chart review, it appears that her bacteremia resulted from cultures on 1/20, and the first vanc trough was taken on 1/23. Follow up culture have been negative and patient reports that she got her PICC line more recently. In addition, patient was transferred with NG tube. She reports that she is able to swallow and that the last time she ate was yesterday, however per nursing hand off, patient was to get formal speech eval which was not done due to transfer.      Overview/Hospital Course:  Patient admitted with sigmoid abscess for IR drainage, evaluated by IR and per repeat imaging, size reduced and no intervention. Also came with a percutaneous cholecystostomy tube placed for acute cholecystitis.   She is also evaluated by ID and continuing Vancomycin, Ciprofloxacin and Flagyl for 2 weeks and repeat CT  AP (2/20). Speech evaluated, to continue dysphagia diet.    Interval History: NAEON. Patient pending placement    Review of Systems   Constitutional: Negative for activity change, chills, diaphoresis and fever.   Respiratory: Negative for cough, shortness of breath and wheezing.    Cardiovascular: Negative for chest pain.   Gastrointestinal: Negative for abdominal pain, constipation, diarrhea, nausea and vomiting.   Genitourinary: Negative for dysuria, frequency and urgency.   Neurological: Negative for dizziness.   Hematological: Does not bruise/bleed easily.     Objective:     Vital Signs (Most Recent):  Temp: 98.9 °F (37.2 °C) (02/23/20 1226)  Pulse: 72 (02/23/20 1226)  Resp: 18 (02/23/20 1226)  BP: 115/71 (02/23/20 1226)  SpO2: 95 % (02/23/20 1226) Vital Signs (24h Range):  Temp:  [98.3 °F (36.8 °C)-99.7 °F (37.6 °C)] 98.9 °F (37.2 °C)  Pulse:  [72-86] 72  Resp:  [14-18] 18  SpO2:  [90 %-95 %] 95 %  BP: (109-144)/(55-71) 115/71     Weight: 77.6 kg (171 lb 1.2 oz)  Body mass index is 28.47 kg/m².    Intake/Output Summary (Last 24 hours) at 2/23/2020 1453  Last data filed at 2/22/2020 1800  Gross per 24 hour   Intake 240 ml   Output --   Net 240 ml      Physical Exam   Constitutional: She appears well-developed and well-nourished.   HENT:   Head: Normocephalic and atraumatic.   Eyes: Pupils are equal, round, and reactive to light. EOM are normal.   Neck: Normal range of motion. No tracheal deviation present.   Cardiovascular: Normal heart sounds.   No murmur heard.  Pulmonary/Chest: Effort normal. She has no wheezes. She has no rales.   Abdominal: Soft. Bowel sounds are normal. There is no tenderness.   Percutaneous drain   Musculoskeletal: Normal range of motion. She exhibits no edema.   Neurological: She is alert.   Skin: Skin is warm and dry.       Significant Labs: All pertinent labs within the past 24 hours have been reviewed.    Significant Imaging: I have reviewed all pertinent imaging results/findings  within the past 24 hours.      Assessment/Plan:      * Abscess of sigmoid colon  - Patient with evidence of  an abscess of 3.5 x 5 x 5.3 cm insinuated between the sigmoid colon and the dome of the urinary bladder, possibly in the setting of sigmoid diverticulitis  - Not drained by IR at the time as it was deemed unsafe to do so  - Family wanted transfer for IR second opinion.   - Evaluated by IR, decreased size of the sigmoid collection, intervention deferred for now  - Blood cultures x2 collected, per outside hospital records, blood cultures have been clear since 1/20 ( See MSSA bacteremia)  - Continue Cipro and flagyl until 3/20      Hypokalemia  -replete prn      Dysphagia  -diet: pureed w/ nectar thick liquids        Paroxysmal A-fib  - Patient with reports of new onset paroxysmal afib during course of hospital stay  - Appears to be in sinus rhythm at the time of initial exam  - Continue metoprolol. Patient was getting 25 mg QID prior to transfer. Unclear if this was out of caution for hypotension or if increased frequency was needed for continued rate control. Will keep at this dose for now as patient may have received some doses prior to transfer. Consider switching to metoprolol succinate in the morning.   - Patient not anticoagulated at this time due to significant hematuria prior to transfer  * [id number] Score for Atrial Fibrillation Stroke Ris  RESULT SUMMARY:  7 points  Stroke risk was 11.2% per year in >90,000 patients (the [place] Atrial Fibrillation Cohort Study) and 15.7% risk of stroke/TIA/systemic embolism  INPUTS:  Age --> 1 = 65-74  Sex --> 1 = Female  CHF history --> 0 = No  Hypertension history --> 1 = Yes  Stroke/TIA/thromboembolism history --> 2 = Yes  Vascular disease history (prior MI, peripheral artery disease, or aortic plaque) --> 1 = Yes  Diabetes history --> 1 = Yes  -on apixaban 5mg BID          Hematuria  Blood loss anemia    - Patient with complicated hospital course including  significant hematuria leading to blood loss anemia and need for blood transfusion.   - Hg stable at 8.5 for now. Patient has purewick, no signs of gross hematuria. INR 1.1  - type and screen ordered, blood consent signed  - Will keep cbc at daily frequency for now. If Hg with significant drop in the morning, consider increasing frequency and transfuse for Hg<7  - in terms of anticoagulation (see paroxysmal afib), hold for now. If Hg remains stable, consider starting anticoagulation        COPD (chronic obstructive pulmonary disease)  Respiratory failure with hypoxia  Respiratory distress    - Patient treated for COPD exacerbation with full course of steroids and breathing treatments prior to transfer  - Patient does not have formal diagnosis of COPD but reports heavy smoking history at a pack a day for many years  - Patient also reports using 3 L O2 at home, but daughter is not sure. Unspecified chronicity of hypoxic respiratory failure  - Will continue breathing treatment q6 and incentive spirometry  - Will follow up vbg as patient has apparent increased work of breathing.  - Patient is not on any inhalers at home, she will likely need rescue inhaler and inhaled corticosteroid at minimum at discharge       MSSA bacteremia  - Patient with evidence of MSSA bacteremia on blood cultures taken on 1/20. First vanc trough drawn on 1/23  - Sensitivities show that staph aureus is sensitive to cefazolin and oxacillin. However patient is still on vanc with documented allergy to penicillins. However patient is unclear as to what reaction she gets and daughter is unclear as well  - In addition, it appears that patient would have received full course of antibiotic for bacteremia as no further blood cultures were positive for MSSA after 1/20 cultures  - Blood cultures repeated  - Evaluated by ID appreciate recs, vancomycin course completed  Off note  shunt was tapped by MANDEEP in OSH on 1/25 with negative studies  Discussed with  NSGY, as patient does not have meningitis,  shunt is not the source of MSSA and if menigitis suspected to first evaluate with LP.         Discharge planning issues  - Medically ready to go to SNF  Follow up OP with GI and ID on 3/20 with repeat CT AP with contrast   - weeklyCBC and CMP ESR CRP sent to ID clinic at  attn to Dr. Snyder         Cholecystitis  - Ct scan done on 1/20 showed gallbladder to be markedly distended with sludge and stones, and with an indistinct wall. There was some pericholecystic inflammation in the posterior part. She had a percutaneous cholecystostomy tube placed on 1/24 for the acute cholecystitis  - Drain still in place  - Will continue cipro flagyl       Type 2 diabetes mellitus, without long-term current use of insulin  - HA1c 6.9    Diet - Dental soft/dysphagia    - Insulin Detemir 5 daily  - Insulin Aspart 4 TID  - SSI     POCT Glucose - Before meals and QHS  Goal BG - 140-180              Hypothyroid  - Continue home levothyroxine.  - 2/7 thyroid labs show TSH elevated at 32, but T4 wnl of 1.05    Essential hypertension  - Patient takes amlodipine and hydrochlorothiazide at home  - metoprolol started for rate control for new onset afib prior to transfer  - Will resume metoprolol and amlodipine as patient is mildly hypertensive  - Resume hydrochlorothiazide as needed        VTE Risk Mitigation (From admission, onward)         Ordered     apixaban tablet 5 mg  2 times daily      02/13/20 0927     Place sequential compression device  Until discontinued      02/08/20 0233     IP VTE HIGH RISK PATIENT  Once      02/07/20 1915                      Chani Mckenzie MD  Department of Hospital Medicine   [facility]

## 2020-02-23 NOTE — PLAN OF CARE
Problem: Adult Inpatient Plan of Care  Goal: Patient-Specific Goal (Individualization)  Outcome: Ongoing, Progressing     Problem: Adult Inpatient Plan of Care  Goal: Readiness for Transition of Care  Outcome: Ongoing, Progressing     Problem: Diabetes Comorbidity  Goal: Blood Glucose Level Within Desired Range  Outcome: Ongoing, Progressing

## 2020-02-23 NOTE — SUBJECTIVE & OBJECTIVE
Interval History: NAEON. Patient pending placement    Review of Systems   Constitutional: Negative for activity change, chills, diaphoresis and fever.   Respiratory: Negative for cough, shortness of breath and wheezing.    Cardiovascular: Negative for chest pain.   Gastrointestinal: Negative for abdominal pain, constipation, diarrhea, nausea and vomiting.   Genitourinary: Negative for dysuria, frequency and urgency.   Neurological: Negative for dizziness.   Hematological: Does not bruise/bleed easily.     Objective:     Vital Signs (Most Recent):  Temp: 98.9 °F (37.2 °C) (02/23/20 1226)  Pulse: 72 (02/23/20 1226)  Resp: 18 (02/23/20 1226)  BP: 115/71 (02/23/20 1226)  SpO2: 95 % (02/23/20 1226) Vital Signs (24h Range):  Temp:  [98.3 °F (36.8 °C)-99.7 °F (37.6 °C)] 98.9 °F (37.2 °C)  Pulse:  [72-86] 72  Resp:  [14-18] 18  SpO2:  [90 %-95 %] 95 %  BP: (109-144)/(55-71) 115/71     Weight: 77.6 kg (171 lb 1.2 oz)  Body mass index is 28.47 kg/m².    Intake/Output Summary (Last 24 hours) at 2/23/2020 1453  Last data filed at 2/22/2020 1800  Gross per 24 hour   Intake 240 ml   Output --   Net 240 ml      Physical Exam   Constitutional: She appears well-developed and well-nourished.   HENT:   Head: Normocephalic and atraumatic.   Eyes: Pupils are equal, round, and reactive to light. EOM are normal.   Neck: Normal range of motion. No tracheal deviation present.   Cardiovascular: Normal heart sounds.   No murmur heard.  Pulmonary/Chest: Effort normal. She has no wheezes. She has no rales.   Abdominal: Soft. Bowel sounds are normal. There is no tenderness.   Percutaneous drain   Musculoskeletal: Normal range of motion. She exhibits no edema.   Neurological: She is alert.   Skin: Skin is warm and dry.       Significant Labs: All pertinent labs within the past 24 hours have been reviewed.    Significant Imaging: I have reviewed all pertinent imaging results/findings within the past 24 hours.

## 2020-02-23 NOTE — NURSING
Patient remained free from falls or injuries throughout shift. She did not try to exit her bed, and called for help as needed. Patient had no complaints of pain or distress, took medication as scheduled, and was compliant with requests from staff.

## 2020-02-24 LAB
POCT GLUCOSE: 132 MG/DL (ref 70–110)
POCT GLUCOSE: 140 MG/DL (ref 70–110)
POCT GLUCOSE: 202 MG/DL (ref 70–110)
TB INDURATION 48 - 72 HR READ: 0 MM

## 2020-02-24 PROCEDURE — 25000003 PHARM REV CODE 250: Performed by: STUDENT IN AN ORGANIZED HEALTH CARE EDUCATION/TRAINING PROGRAM

## 2020-02-24 PROCEDURE — 25000003 PHARM REV CODE 250: Performed by: INTERNAL MEDICINE

## 2020-02-24 PROCEDURE — 11000001 HC ACUTE MED/SURG PRIVATE ROOM

## 2020-02-24 PROCEDURE — 97112 NEUROMUSCULAR REEDUCATION: CPT | Mod: CQ

## 2020-02-24 PROCEDURE — 99232 SBSQ HOSP IP/OBS MODERATE 35: CPT | Mod: ,,, | Performed by: INTERNAL MEDICINE

## 2020-02-24 PROCEDURE — 99232 PR SUBSEQUENT HOSPITAL CARE,LEVL II: ICD-10-PCS | Mod: ,,, | Performed by: INTERNAL MEDICINE

## 2020-02-24 PROCEDURE — 97530 THERAPEUTIC ACTIVITIES: CPT | Mod: CQ

## 2020-02-24 PROCEDURE — 63600175 PHARM REV CODE 636 W HCPCS: Performed by: STUDENT IN AN ORGANIZED HEALTH CARE EDUCATION/TRAINING PROGRAM

## 2020-02-24 RX ORDER — METOPROLOL SUCCINATE 100 MG/1
100 TABLET, EXTENDED RELEASE ORAL DAILY
Qty: 30 TABLET | Refills: 2 | Status: SHIPPED | OUTPATIENT
Start: 2020-02-24

## 2020-02-24 RX ORDER — ALBUTEROL SULFATE 1.25 MG/3ML
1.25 SOLUTION RESPIRATORY (INHALATION) EVERY 6 HOURS PRN
Qty: 90 ML | Refills: 0 | Status: SHIPPED | OUTPATIENT
Start: 2020-02-24 | End: 2021-02-23

## 2020-02-24 RX ORDER — CIPROFLOXACIN 500 MG/1
500 TABLET ORAL EVERY 12 HOURS
Qty: 50 TABLET | Refills: 0 | Status: SHIPPED | OUTPATIENT
Start: 2020-02-24 | End: 2020-03-22

## 2020-02-24 RX ORDER — ALBUTEROL SULFATE 1.25 MG/3ML
1.25 SOLUTION RESPIRATORY (INHALATION) EVERY 6 HOURS PRN
Qty: 1 BOX | Refills: 0 | Status: SHIPPED | OUTPATIENT
Start: 2020-02-24 | End: 2020-02-24 | Stop reason: SDUPTHER

## 2020-02-24 RX ORDER — METRONIDAZOLE 500 MG/1
500 TABLET ORAL EVERY 8 HOURS
Qty: 75 TABLET | Refills: 0 | Status: SHIPPED | OUTPATIENT
Start: 2020-02-24 | End: 2020-03-22

## 2020-02-24 RX ADMIN — APIXABAN 5 MG: 5 TABLET, FILM COATED ORAL at 09:02

## 2020-02-24 RX ADMIN — AMLODIPINE BESYLATE 10 MG: 10 TABLET ORAL at 09:02

## 2020-02-24 RX ADMIN — METRONIDAZOLE 500 MG: 500 TABLET ORAL at 06:02

## 2020-02-24 RX ADMIN — INSULIN ASPART 4 UNITS: 100 INJECTION, SOLUTION INTRAVENOUS; SUBCUTANEOUS at 12:02

## 2020-02-24 RX ADMIN — INSULIN ASPART 2 UNITS: 100 INJECTION, SOLUTION INTRAVENOUS; SUBCUTANEOUS at 12:02

## 2020-02-24 RX ADMIN — DOCUSATE SODIUM - SENNOSIDES 1 TABLET: 50; 8.6 TABLET, FILM COATED ORAL at 09:02

## 2020-02-24 RX ADMIN — METRONIDAZOLE 500 MG: 500 TABLET ORAL at 02:02

## 2020-02-24 RX ADMIN — CIPROFLOXACIN HYDROCHLORIDE 500 MG: 500 TABLET, FILM COATED ORAL at 09:02

## 2020-02-24 RX ADMIN — POLYETHYLENE GLYCOL 3350 17 G: 17 POWDER, FOR SOLUTION ORAL at 09:02

## 2020-02-24 RX ADMIN — LEVOTHYROXINE SODIUM 200 MCG: 100 TABLET ORAL at 06:02

## 2020-02-24 RX ADMIN — INSULIN ASPART 4 UNITS: 100 INJECTION, SOLUTION INTRAVENOUS; SUBCUTANEOUS at 09:02

## 2020-02-24 RX ADMIN — METRONIDAZOLE 500 MG: 500 TABLET ORAL at 10:02

## 2020-02-24 RX ADMIN — INSULIN DETEMIR 5 UNITS: 100 INJECTION, SOLUTION SUBCUTANEOUS at 09:02

## 2020-02-24 RX ADMIN — INSULIN ASPART 4 UNITS: 100 INJECTION, SOLUTION INTRAVENOUS; SUBCUTANEOUS at 05:02

## 2020-02-24 RX ADMIN — METOPROLOL SUCCINATE 100 MG: 100 TABLET, EXTENDED RELEASE ORAL at 09:02

## 2020-02-24 NOTE — ASSESSMENT & PLAN NOTE
Blood loss anemia    - Patient with complicated hospital course including significant hematuria leading to blood loss anemia and need for blood transfusion.   - Hg stable at 8.5 for now. Patient has purewick, no signs of gross hematuria. INR 1.1  - type and screen ordered, blood consent signed  - Will keep cbc at daily frequency for now. If Hg with significant drop in the morning, consider increasing frequency and transfuse for Hg<7  - in terms of anticoagulation (see paroxysmal afib), hold for now. If Hg remains stable, consider starting anticoagulation - on apixiban

## 2020-02-24 NOTE — ASSESSMENT & PLAN NOTE
- Patient with reports of new onset paroxysmal afib during course of hospital stay  - Appears to be in sinus rhythm at the time of initial exam  - Continue metoprolol. Patient was getting 25 mg QID prior to transfer. Unclear if this was out of caution for hypotension or if increased frequency was needed for continued rate control. Will keep at this dose for now as patient may have received some doses prior to transfer. Consider switching to metoprolol succinate in the morning.   - Patient not anticoagulated at this time due to significant hematuria prior to transfer  * [id number] Score for Atrial Fibrillation Stroke Ris  RESULT SUMMARY:  7 points  Stroke risk was 11.2% per year in >90,000 patients (the [place] Atrial Fibrillation Cohort Study) and 15.7% risk of stroke/TIA/systemic embolism  INPUTS:  Age --> 1 = 65-74  Sex --> 1 = Female  CHF history --> 0 = No  Hypertension history --> 1 = Yes  Stroke/TIA/thromboembolism history --> 2 = Yes  Vascular disease history (prior MI, peripheral artery disease, or aortic plaque) --> 1 = Yes  Diabetes history --> 1 = Yes    -on apixaban 5mg BID and metoprolol

## 2020-02-24 NOTE — PLAN OF CARE
Problem: Physical Therapy Goal  Goal: Physical Therapy Goal  Description  Goals to be met by: 3/8/2020    Patient will increase functional independence with mobility by performin. Supine to sit with MInimal Assistance - MET       Supine to sit with CGA  2. Sit to supine with MInimal Assistance - MET       Supine to sit with CGA   3. Sit to stand transfer with Moderate Assistance - MET       Sit to stand transfer with CGA   4. Gait  x 15 feet with Minimal Assistance using LRAD  5. Lower extremity exercise program x15 reps per handout, with independence     Outcome: Ongoing, Progressing.   Patient progressing slowly, as shortness of breath and fatigue limits tolerance to strengthening activities.

## 2020-02-24 NOTE — DISCHARGE SUMMARY
[facility] Hospital Medicine  Discharge Summary      Patient Name: Jeni Chacon  MRN: [id number]  Admission Date: 2/7/2020  Hospital Length of Stay: 17 days  Discharge Date and Time:  02/24/2020 3:56 PM  Attending Physician: Tiara Killian MD   Discharging Provider: Kasey Augustine MD  Primary Care Provider: Primary Doctor [facility] Medicine Team: [facility] MED 2 Kasey Augustine MD    HPI:   Patient is a 74 year old female with medical history significant for hypothyroidism, HTN, DMII with neuropathy, tobacco abuse (with no diagnosis of COPD), and history of CVA s/p  shunt who initially presented to [place] with complaints of SOB and abdominal pain. Although patient is oriented to person, time, and place, she still is having some odd behavior. So I gathered her history from her, her medical chart, and from calling her daughter. She reports that late January she had been having abdominal pain with associated SOB for a few weeks; despite a heavy smoking history, family reports that she has never had SOB or a diagnosis of COPD. She reports no alleviating or aggravating factors, just that the pain was sharp and diffuse.   She was admitted on 1/19 and had a complicated hospital course. Briefly, patient underwent CT abdomen on 1/20 which showed evidence of cholecystitis as well as an sigmoid colon abscess. She had a percutaneous cholecystostomy tube placed on 1/24. The abscess was not drained as IR felt it was unsafe to do so. During her hospital course, her blood cultures were positive for MSSA and she had evidence of UTI. Her antibiotics were changed a few times, but she currently is on cipro and flagyl for intraabdominal infection prophylaxis and on vanc and rifampin for MSSA bacteremia. KESHIA showed inferobasal hypokinesis and EF of 55%; no vegetation noted.  In addition to this she had O2 saturation in the mid-80s. Initially she was placed on bipap and treated for COPD exacerbation; she  completed a course of steroids and got one dose of Lasix. However her respiratory status worsening and she was placed in MICU and required intubation from 1/20-1/31. She was Bipap dependent following extubation until 2/6 and is now on nasal cannula; of note, she did require pressors during MICU stay. On top of all this, patient also developed paroxysmal a fib. CHADSVASC elevated however she could not be anticoagulated due to gross hematuria requiring blood transfusion. Patient transferred for second IR opinion regarding drainage of sigmoid abscess and out of confusion and frustration from the multiple consultants at [place].     At the time of my exam, patient was tachycardic, tachypneic, and mildly hypertension. She was AAO x3, however denied all symptoms on review of systems despite appear to have increased work of breathing. She reports that this is her baseline breathing and that she is on 3 L of O2 at home, however she exhibits odd behavior like trying to squirm out of the bed, or being unable to sign blood consent because it does not look right. Of note, patient was transferred with PICC line, however per chart review, it appears that her bacteremia resulted from cultures on 1/20, and the first vanc trough was taken on 1/23. Follow up culture have been negative and patient reports that she got her PICC line more recently. In addition, patient was transferred with NG tube. She reports that she is able to swallow and that the last time she ate was yesterday, however per nursing hand off, patient was to get formal speech eval which was not done due to transfer.      * No surgery found *      Hospital Course:   Patient admitted with sigmoid abscess for IR drainage, evaluated by IR and per repeat imaging, size reduced and no intervention. Also came with a percutaneous cholecystostomy tube placed for acute cholecystitis.   She is also evaluated by ID: ID recommend discontinuing vancomycin as abscess is  decreasing in size. Recommend continuing Cipro/Flagyl x 4 weeks. Speech evaluated, to continue dysphagia diet. PT/OT recommending SNF. Awaiting placement. Patient medically ready for discharge. Home health secured 2/24.     Vitals:    02/24/20 0537 02/24/20 0741 02/24/20 1143 02/24/20 1514   BP: 121/67 122/67 (!) 114/58 125/73   BP Location:    Left arm   Patient Position: Lying  Lying Lying   Pulse: 76 80 81 83   Resp: 18 19 14    Temp: 98.2 °F (36.8 °C) 98 °F (36.7 °C) 98.4 °F (36.9 °C) 98.9 °F (37.2 °C)   TempSrc: Oral  Oral Oral   SpO2: 96% (!) 93% (!) 94% (!) 93%   Weight:       Height:         Physical Exam   Constitutional: She appears well-developed and well-nourished.   HENT:   Head: Normocephalic and atraumatic.   Eyes: Pupils are equal, round, and reactive to light. EOM are normal.   Neck: Normal range of motion. No tracheal deviation present.   Cardiovascular: Normal heart sounds.   No murmur heard.  Pulmonary/Chest: Effort normal. She has no wheezes. She has no rales.   Abdominal: Soft. Bowel sounds are normal. There is no tenderness.   Percutaneous drain   Musculoskeletal: Normal range of motion. She exhibits no edema.   Neurological: She is alert.   Skin: Skin is warm and dry.     Consults:   Consults (From admission, onward)        Status Ordering Provider     Inpatient consult to Infectious Diseases  Once     Provider:  (Not yet assigned)    Completed CECY SOUZA     Inpatient consult to Interventional Radiology  Once     Provider:  (Not yet assigned)    Completed CECY SOUZA     Inpatient consult to Registered Dietitian/Nutritionist  Once     Provider:  (Not yet assigned)    Completed TANISHA JOLLY          * Abscess of sigmoid colon  - Patient with evidence of  an abscess of 3.5 x 5 x 5.3 cm insinuated between the sigmoid colon and the dome of the urinary bladder, possibly in the setting of sigmoid diverticulitis  - Not drained by IR at the time as it was deemed unsafe to do so  -  Family wanted transfer for IR second opinion.   - Evaluated by IR, decreased size of the sigmoid collection, intervention deferred for now  - Blood cultures x2 collected, per outside hospital records, blood cultures have been clear since 1/20 ( See MSSA bacteremia)  - Continue Cipro and flagyl until 3/20      Hypokalemia  -replete prn      Paroxysmal A-fib  - Patient with reports of new onset paroxysmal afib during course of hospital stay  - Appears to be in sinus rhythm at the time of initial exam  - Continue metoprolol. Patient was getting 25 mg QID prior to transfer. Unclear if this was out of caution for hypotension or if increased frequency was needed for continued rate control. Will keep at this dose for now as patient may have received some doses prior to transfer. Consider switching to metoprolol succinate in the morning.   - Patient not anticoagulated at this time due to significant hematuria prior to transfer  * [id number] Score for Atrial Fibrillation Stroke Ris  RESULT SUMMARY:  7 points  Stroke risk was 11.2% per year in >90,000 patients (the [place] Atrial Fibrillation Cohort Study) and 15.7% risk of stroke/TIA/systemic embolism  INPUTS:  Age --> 1 = 65-74  Sex --> 1 = Female  CHF history --> 0 = No  Hypertension history --> 1 = Yes  Stroke/TIA/thromboembolism history --> 2 = Yes  Vascular disease history (prior MI, peripheral artery disease, or aortic plaque) --> 1 = Yes  Diabetes history --> 1 = Yes    -on apixaban 5mg BID and metoprolol          Hematuria  Blood loss anemia    - Patient with complicated hospital course including significant hematuria leading to blood loss anemia and need for blood transfusion.   - Hg stable at 8.5 for now. Patient has purewick, no signs of gross hematuria. INR 1.1  - type and screen ordered, blood consent signed  - Will keep cbc at daily frequency for now. If Hg with significant drop in the morning, consider increasing frequency and transfuse for Hg<7  - in terms  of anticoagulation (see paroxysmal afib), hold for now. If Hg remains stable, consider starting anticoagulation - on apixiban        MSSA bacteremia  - Patient with evidence of MSSA bacteremia on blood cultures taken on 1/20. First vanc trough drawn on 1/23  - Sensitivities show that staph aureus is sensitive to cefazolin and oxacillin. However patient is still on vanc with documented allergy to penicillins. However patient is unclear as to what reaction she gets and daughter is unclear as well  - In addition, it appears that patient would have received full course of antibiotic for bacteremia as no further blood cultures were positive for MSSA after 1/20 cultures  - Blood cultures repeated  - Evaluated by ID appreciate recs, vancomycin course completed  Off note  shunt was tapped by NSGY in OSH on 1/25 with negative studies  Discussed with NSGY, as patient does not have meningitis,  shunt is not the source of MSSA and if menigitis suspected to first evaluate with LP.         Discharge planning issues  - Medically ready to go to SNF  Follow up OP with GI and ID on 3/20 with repeat CT AP with contrast   - weeklyCBC and CMP ESR CRP sent to ID clinic at  attn to Dr. Snyder     Pt home with HH today.        Cholecystitis  - Ct scan done on 1/20 showed gallbladder to be markedly distended with sludge and stones, and with an indistinct wall. There was some pericholecystic inflammation in the posterior part. She had a percutaneous cholecystostomy tube placed on 1/24 for the acute cholecystitis  - Drain still in place  - Will continue cipro flagyl       Type 2 diabetes mellitus, without long-term current use of insulin  - HA1c 6.9    Diet - Dental soft/dysphagia    - Insulin Detemir 5 daily  - Insulin Aspart 4 TID  - SSI     POCT Glucose - Before meals and QHS  Goal BG - 140-180              Hypothyroid  - Continue home levothyroxine.  - 2/7 thyroid labs show TSH elevated at 32, but T4 wnl of  "1.05    Essential hypertension  - Patient takes amlodipine and hydrochlorothiazide at home  - metoprolol started for rate control for new onset afib prior to transfer  - Will resume metoprolol and amlodipine as patient is mildly hypertensive  - Resume hydrochlorothiazide as needed        Final Active Diagnoses:    Diagnosis Date Noted POA    PRINCIPAL PROBLEM:  Abscess of sigmoid colon [K63.0] 02/07/2020 Yes    Hypokalemia [E87.6] 02/16/2020 Unknown    Essential hypertension [I10] 02/07/2020 Yes    Hypothyroid [E03.9] 02/07/2020 Yes    Type 2 diabetes mellitus, without long-term current use of insulin [E11.9] 02/07/2020 Yes    Cholecystitis [K81.9] 02/07/2020 Yes    Overweight (BMI 25.0-29.9) [E66.3] 02/07/2020 Yes    Discharge planning issues [Z02.9] 02/07/2020 Not Applicable    MSSA bacteremia [R78.81] 02/07/2020 Yes    COPD (chronic obstructive pulmonary disease) [J44.9] 02/07/2020 Yes    Hematuria [R31.9] 02/07/2020 Yes    Blood loss anemia [D50.0] 02/07/2020 Yes    Respiratory failure with hypoxia [J96.91] 02/07/2020 Yes    Paroxysmal A-fib [I48.0] 02/07/2020 Yes      Problems Resolved During this Admission:       Discharged Condition: stable    Disposition: [facility]-Health Care [facility] [place]    Follow Up:  Follow-up Information     [facility] In 2 days.    Contact information:  [address] [address] [phone]           [facility]    Specialty:  DME Provider  Contact information:  [address] [facility] [address]  [facility] [id number] [phone]                 Patient Instructions:      OXYGEN FOR HOME USE     Order Specific Question Answer Comments   Liter Flow 2    Duration Continuous    Qualifying SpO2: 87    Testing done at: Rest    Route nasal cannula    Device home concentrator    Length of need (in months): 99 mos    Patient condition with qualifying saturation COPD    Height: 5' 5" (1.651 m)    Weight: 77.6 kg (171 lb 1.2 oz)    Does " patient have medical equipment at home? none    Alternative treatment measures have been tried or considered and deemed clinically ineffective. Yes        Significant Diagnostic Studies:   Recent Results (from the past 24 hour(s))   POCT glucose    Collection Time: 02/23/20  5:07 PM   Result Value Ref Range    POCT Glucose 190 (H) 70 - 110 mg/dL   POCT glucose    Collection Time: 02/23/20 10:41 PM   Result Value Ref Range    POCT Glucose 133 (H) 70 - 110 mg/dL   POCT TB Skin Test Read    Collection Time: 02/24/20  8:02 AM   Result Value Ref Range    TB Induration 48 - 72 hr read 0 mm   POCT glucose    Collection Time: 02/24/20  8:27 AM   Result Value Ref Range    POCT Glucose 132 (H) 70 - 110 mg/dL   POCT glucose    Collection Time: 02/24/20 11:38 AM   Result Value Ref Range    POCT Glucose 202 (H) 70 - 110 mg/dL         Pending Diagnostic Studies:     None         Medications:  Reconciled Home Medications:      Medication List      START taking these medications    albuterol 1.25 mg/3 mL Nebu  Commonly known as:  ACCUNEB  Take 3 mLs (1.25 mg total) by nebulization every 6 (six) hours as needed. Rescue     apixaban 5 mg Tab  Commonly known as:  ELIQUIS  Take 1 tablet (5 mg total) by mouth 2 (two) times daily.     ciprofloxacin HCl 500 MG tablet  Commonly known as:  CIPRO  Take 1 tablet (500 mg total) by mouth every 12 (twelve) hours. for 25 days     metoprolol succinate 100 MG 24 hr tablet  Commonly known as:  TOPROL-XL  Take 1 tablet (100 mg total) by mouth once daily.     metroNIDAZOLE 500 MG tablet  Commonly known as:  FLAGYL  Take 1 tablet (500 mg total) by mouth every 8 (eight) hours. for 25 days        CONTINUE taking these medications    amLODIPine 10 MG tablet  Commonly known as:  NORVASC  Take 10 mg by mouth once daily.     conjugated estrogens vaginal cream  Commonly known as:  PREMARIN  Place vaginally once daily.     glimepiride 4 MG tablet  Commonly known as:  AMARYL  Take 4 mg by mouth before  breakfast.     levothyroxine 200 MCG tablet  Commonly known as:  SYNTHROID  Take 200 mcg by mouth before breakfast.        STOP taking these medications    hydrALAZINE 10 MG tablet  Commonly known as:  APRESOLINE     hydroCHLOROthiazide 12.5 MG Tab  Commonly known as:  HYDRODIURIL            Indwelling Lines/Drains at time of discharge:   Lines/Drains/Airways     Drain                 Closed/Suction Drain Right Back Other (Comment) -- days                Time spent on the discharge of patient: 40 minutes  Patient was seen and examined on the date of discharge and determined to be suitable for discharge.         Kasey Augustine MD   PGY2 IM  Department of Hospital Medicine  [facility]

## 2020-02-24 NOTE — PLAN OF CARE
Chalino Cha spoke with liaison Trinh at [facility] that informed CM that Pt will require long term SNF, [facility] agreed to have Pt d/c home with home health. Sw sent home health referral to [facility]. Pt will need home 02, orders to be placed, Sw to follow.

## 2020-02-24 NOTE — PLAN OF CARE
[facility]    HOME HEALTH ORDERS  FACE TO FACE ENCOUNTER    Patient Name: Jeni Chacon  YOB: 1945    PCP: Primary Doctor No   PCP Address: None  PCP Phone Number: None  PCP Fax: None    Encounter Date: 02/24/2020    Admit to Home Health    Diagnoses:  Active Hospital Problems    Diagnosis  POA    *Abscess of sigmoid colon [K63.0]  Yes    Hypokalemia [E87.6]  Unknown    Essential hypertension [I10]  Yes    Hypothyroid [E03.9]  Yes    Type 2 diabetes mellitus, without long-term current use of insulin [E11.9]  Yes    Cholecystitis [K81.9]  Yes    Overweight (BMI 25.0-29.9) [E66.3]  Yes    Discharge planning issues [Z02.9]  Not Applicable    MSSA bacteremia [R78.81]  Yes    COPD (chronic obstructive pulmonary disease) [J44.9]  Yes    Hematuria [R31.9]  Yes    Blood loss anemia [D50.0]  Yes    Respiratory failure with hypoxia [J96.91]  Yes    Paroxysmal A-fib [I48.0]  Yes      Resolved Hospital Problems   No resolved problems to display.       Future Appointments   Date Time Provider Department Center   2/27/2020  4:30 PM Gavi Cuevas MD [facility] ID [address]   3/10/2020  4:00 PM Anya Snyder MD [facility] [address]       I have seen and examined this patient face to face today. My clinical findings that support the need for the home health skilled services and home bound status are the following:  Weakness/numbness causing balance and gait disturbance due to Infection making it taxing to leave home.  Medical restrictions requiring assistance of another human to leave home due to  Cholecystostomy Tube placement.    Allergies:  Review of patient's allergies indicates:   Allergen Reactions    Lisinopril Swelling    Penicillins        Diet: Diabetic, dysphagia diet     Activities: As tolerated    Nursing:   SN to complete comprehensive assessment including routine vital signs. Instruct on disease process and s/s of complications to report to MD. Review/verify  medication list sent home with the patient at time of discharge  and instruct patient/caregiver as needed. Frequency may be adjusted depending on start of care date.    Notify MD if SBP > 160 or < 90; DBP > 90 or < 50; HR > 120 or < 50; Temp > 101    CONSULTS:    Physical Therapy to evaluate and treat. Evaluate for home safety and equipment needs; Establish/upgrade home exercise program. Perform / instruct on therapeutic exercises, gait training, transfer training, and Range of Motion.  Occupational Therapy to evaluate and treat. Evaluate home environment for safety and equipment needs. Perform/Instruct on transfers, ADL training, ROM, and therapeutic exercises.  Aide to provide assistance with personal care, ADLs, and vital signs.    MISCELLANEOUS CARE:  Routine Skin for Bedridden Patients: Instruct patient/caregiver to apply moisture barrier cream to all skin folds and wet areas in perineal area daily and after baths and all bowel movements.  Cholecystostomy Tube: drain to remain in place until patient follows up with surgery outpatient      Medications: Review discharge medications with patient and family and provide education.      Current Discharge Medication List      START taking these medications    Details   albuterol (ACCUNEB) 1.25 mg/3 mL Nebu Take 3 mLs (1.25 mg total) by nebulization every 6 (six) hours as needed. Rescue  Qty: 1 Box, Refills: 0      apixaban (ELIQUIS) 5 mg Tab Take 1 tablet (5 mg total) by mouth 2 (two) times daily.  Qty: 60 tablet, Refills: 11      ciprofloxacin HCl (CIPRO) 500 MG tablet Take 1 tablet (500 mg total) by mouth every 12 (twelve) hours.  Qty: 56 tablet, Refills: 0  End date: 3/20/2020      metoprolol succinate (TOPROL-XL) 100 MG 24 hr tablet Take 1 tablet (100 mg total) by mouth once daily.  Qty: 30 tablet, Refills: 2      metroNIDAZOLE (FLAGYL) 500 MG tablet Take 1 tablet (500 mg total) by mouth every 8 (eight) hours.  Qty: 84 tablet, Refills: 0  End date: 3/20/2020          CONTINUE these medications which have NOT CHANGED    Details   amLODIPine (NORVASC) 10 MG tablet Take 10 mg by mouth once daily.      conjugated estrogens (PREMARIN) vaginal cream Place vaginally once daily.      glimepiride (AMARYL) 4 MG tablet Take 4 mg by mouth before breakfast.      levothyroxine (SYNTHROID) 200 MCG tablet Take 200 mcg by mouth before breakfast.         STOP taking these medications       hydrALAZINE (APRESOLINE) 10 MG tablet Comments:   Reason for Stopping:         hydroCHLOROthiazide (HYDRODIURIL) 12.5 MG Tab Comments:   Reason for Stopping:               I certify that this patient is confined to her home and needs physical therapy and occupational therapy.

## 2020-02-24 NOTE — PLAN OF CARE
"Jesus spoke with [facility] by phone at . Informed of denials by numerous facilities [facility] the [facility] and that [facility] accept Pt. [facility] asked about OS[facility], Jesus informed he would see if bed available today as pt is stable to d.c. [facility] also asked "if therapy could come to the home," Jesus informed for 3 days a week, nurse and therapy would come to house to work with Pt. Pt doing well with therapy, Jesus updated CM leader Elisa Brooks on above and left message with liagypsy Tsang at [facility] to see if bed is available for Pt today. Sw to follow.   "

## 2020-02-24 NOTE — PLAN OF CARE
02/24/20 0845   Post-Acute Status   Post-Acute Authorization Placement   Post-Acute Placement Status Referrals Sent   Discharge Delays (!) Patient and Family Barriers     Sw met with pt in room, pt defers to dght on placement options. Sw to call Jeannine and follow with accepting facility and inform Pt is table to d/c.

## 2020-02-24 NOTE — PT/OT/SLP PROGRESS
Physical Therapy Treatment    Patient Name:  Jeni Chacon   MRN:  [id number]    Recommendations:     Discharge Recommendations:  nursing facility, skilled   Discharge Equipment Recommendations: (TBD)   Barriers to discharge: Inaccessible home and Decreased caregiver support    Assessment:     Jeni Chacon is a 74 y.o. female admitted with a medical diagnosis of Abscess of sigmoid colon.  She presents with the following impairments/functional limitations:  weakness, impaired endurance, impaired self care skills, impaired functional mobilty, gait instability, impaired balance, decreased coordination, decreased lower extremity function, decreased safety awareness, impaired cardiopulmonary response to activity, decreased upper extremity function, impaired fine motor, abnormal tone . Patient showed good participation, with min anxiety noted at the beginning of treatment due to shortness of breath. Patient shows decreased postural control in standing, with decreased weight shifting ability trough the R LE and UE..    Rehab Prognosis: Fair; patient would benefit from acute skilled PT services to address these deficits and reach maximum level of function.    Recent Surgery: * No surgery found *      Plan:     During this hospitalization, patient to be seen 3 x/week to address the identified rehab impairments via gait training, therapeutic activities, therapeutic exercises, neuromuscular re-education and progress toward the following goals:    · Plan of Care Expires:  03/08/20    Subjective     Chief Complaint: fatigue and weakness  Patient/Family Comments/goals: to go to SNF soon and to get stronger.  Pain/Comfort:  · Pain Rating 1: 0/10  · Pain Rating Post-Intervention 1: 0/10      Objective:     Communicated with nsg prior to session.  Patient found HOB elevated with bed alarm, telemetry, oxygen, nephrostomy upon PT entry to room.     General Precautions: Standard, aspiration, fall, dental soft   Orthopedic Precautions:N/A    Braces: N/A     Functional Mobility:  · Bed Mobility:     · Rolling Left:  minimum assistance  · Scooting: moderate assistance  · Supine to Sit: minimum assistance  · Sit to Supine: minimum assistance  · Transfers:     · Sit to Stand:  moderate assistance with rolling walker  · Gait: 3 steps fwd/bwd x 3 trials with RW and mod assistance, with lonmg rest breaks in sitting due to shortness of breath.      AM-PAC 6 CLICK MOBILITY  Turning over in bed (including adjusting bedclothes, sheets and blankets)?: 3  Sitting down on and standing up from a chair with arms (e.g., wheelchair, bedside commode, etc.): 2  Moving from lying on back to sitting on the side of the bed?: 3  Moving to and from a bed to a chair (including a wheelchair)?: 2  Need to walk in hospital room?: 2  Climbing 3-5 steps with a railing?: 1  Basic Mobility Total Score: 13       Therapeutic Activities and Exercises:   Donned a second gown. Patient sat at EOB to prepare for treatment. Static standing balance with RW for support 3x45 secs with min assistance, due to posterior lean and min anxiety.    Patient left with bed in chair position with all lines intact, call button in reach and bed alarm on..    GOALS:   Multidisciplinary Problems     Physical Therapy Goals        Problem: Physical Therapy Goal    Goal Priority Disciplines Outcome Goal Variances Interventions   Physical Therapy Goal     PT, PT/OT Ongoing, Progressing     Description:  Goals to be met by: 3/8/2020    Patient will increase functional independence with mobility by performin. Supine to sit with MInimal Assistance - MET       Supine to sit with CGA  2. Sit to supine with MInimal Assistance - MET       Supine to sit with CGA   3. Sit to stand transfer with Moderate Assistance - MET       Sit to stand transfer with CGA   4. Gait  x 15 feet with Minimal Assistance using LRAD  5. Lower extremity exercise program x15 reps per handout, with independence                      Time  Tracking:     PT Received On: 02/24/20  PT Start Time: 1004     PT Stop Time: 1028  PT Total Time (min): 24 min     Billable Minutes: Therapeutic Activity 12 and Neuromuscular Re-education 12    Treatment Type: Treatment  PT/PTA: PTA     PTA Visit Number: 1     Thaddeus Velásquez, PTA  02/24/2020

## 2020-02-24 NOTE — ASSESSMENT & PLAN NOTE
- Medically ready to go to SNF  Follow up OP with GI and ID on 3/20 with repeat CT AP with contrast   - weeklyCBC and CMP ESR CRP sent to ID clinic at  attn to Dr. Snyder     Pt home with HH today.

## 2020-02-24 NOTE — PROGRESS NOTES
Home Oxygen Evaluation    Date Performed: 2/24/2020    1) Patient's Home O2 Sat on room air, while at rest: 87%        If O2 sats on room air at rest are 88% or below, patient qualifies. No additional testing needed. Document N/A in steps 2 and 3. If 89% or above, complete steps 2.      2) Patient's O2 Sat on room air while exercising: n/a        If O2 sats on room air while exercising remain 89% or above patient does not qualify, no further testing needed Document N/A in step 3. If O2 sats on room air while exercising are 88% or below, continue to step 3.      3) Patient's O2 Sat while exercising on O2:n/a           (Must show improvement from #2 for patients to qualify)    If O2 sats improve on oxygen, patient qualifies for portable oxygen. If not, the patient does not qualify.

## 2020-02-24 NOTE — PLAN OF CARE
CM spoke with patient's daughter Jeannine Varela in regards to discharge planning. CM informed patients daughter that [facility] SNF could not accommodate Ms. Chacon at the moment. CM discussed the patients options of [facility] or . Patients daughter is agreeable to take patient home with HH.

## 2020-02-25 VITALS
TEMPERATURE: 98 F | SYSTOLIC BLOOD PRESSURE: 118 MMHG | HEART RATE: 81 BPM | WEIGHT: 171.06 LBS | OXYGEN SATURATION: 93 % | HEIGHT: 65 IN | RESPIRATION RATE: 14 BRPM | BODY MASS INDEX: 28.5 KG/M2 | DIASTOLIC BLOOD PRESSURE: 59 MMHG

## 2020-02-25 LAB
ANION GAP SERPL CALC-SCNC: 9 MMOL/L (ref 8–16)
BASOPHILS # BLD AUTO: 0.02 K/UL (ref 0–0.2)
BASOPHILS NFR BLD: 0.3 % (ref 0–1.9)
BUN SERPL-MCNC: 4 MG/DL (ref 8–23)
CALCIUM SERPL-MCNC: 8.4 MG/DL (ref 8.7–10.5)
CHLORIDE SERPL-SCNC: 104 MMOL/L (ref 95–110)
CO2 SERPL-SCNC: 23 MMOL/L (ref 23–29)
CREAT SERPL-MCNC: 0.8 MG/DL (ref 0.5–1.4)
DIFFERENTIAL METHOD: ABNORMAL
EOSINOPHIL # BLD AUTO: 0.1 K/UL (ref 0–0.5)
EOSINOPHIL NFR BLD: 2 % (ref 0–8)
ERYTHROCYTE [DISTWIDTH] IN BLOOD BY AUTOMATED COUNT: 18.4 % (ref 11.5–14.5)
EST. GFR  (AFRICAN AMERICAN): >60 ML/MIN/1.73 M^2
EST. GFR  (NON AFRICAN AMERICAN): >60 ML/MIN/1.73 M^2
GLUCOSE SERPL-MCNC: 115 MG/DL (ref 70–110)
HCT VFR BLD AUTO: 29.8 % (ref 37–48.5)
HGB BLD-MCNC: 9.5 G/DL (ref 12–16)
IMM GRANULOCYTES # BLD AUTO: 0.01 K/UL (ref 0–0.04)
IMM GRANULOCYTES NFR BLD AUTO: 0.2 % (ref 0–0.5)
LYMPHOCYTES # BLD AUTO: 1.5 K/UL (ref 1–4.8)
LYMPHOCYTES NFR BLD: 24.7 % (ref 18–48)
MCH RBC QN AUTO: 28.1 PG (ref 27–31)
MCHC RBC AUTO-ENTMCNC: 31.9 G/DL (ref 32–36)
MCV RBC AUTO: 88 FL (ref 82–98)
MONOCYTES # BLD AUTO: 0.8 K/UL (ref 0.3–1)
MONOCYTES NFR BLD: 14.2 % (ref 4–15)
NEUTROPHILS # BLD AUTO: 3.5 K/UL (ref 1.8–7.7)
NEUTROPHILS NFR BLD: 58.6 % (ref 38–73)
NRBC BLD-RTO: 0 /100 WBC
PLATELET # BLD AUTO: 360 K/UL (ref 150–350)
PMV BLD AUTO: 9.4 FL (ref 9.2–12.9)
POCT GLUCOSE: 135 MG/DL (ref 70–110)
POTASSIUM SERPL-SCNC: 4.1 MMOL/L (ref 3.5–5.1)
RBC # BLD AUTO: 3.38 M/UL (ref 4–5.4)
SODIUM SERPL-SCNC: 136 MMOL/L (ref 136–145)
WBC # BLD AUTO: 5.92 K/UL (ref 3.9–12.7)

## 2020-02-25 PROCEDURE — 25000003 PHARM REV CODE 250: Performed by: INTERNAL MEDICINE

## 2020-02-25 PROCEDURE — 80048 BASIC METABOLIC PNL TOTAL CA: CPT

## 2020-02-25 PROCEDURE — 25000003 PHARM REV CODE 250: Performed by: STUDENT IN AN ORGANIZED HEALTH CARE EDUCATION/TRAINING PROGRAM

## 2020-02-25 PROCEDURE — 27000221 HC OXYGEN, UP TO 24 HOURS

## 2020-02-25 PROCEDURE — 99239 PR HOSPITAL DISCHARGE DAY,>30 MIN: ICD-10-PCS | Mod: ,,, | Performed by: INTERNAL MEDICINE

## 2020-02-25 PROCEDURE — 99239 HOSP IP/OBS DSCHRG MGMT >30: CPT | Mod: ,,, | Performed by: INTERNAL MEDICINE

## 2020-02-25 PROCEDURE — 85025 COMPLETE CBC W/AUTO DIFF WBC: CPT

## 2020-02-25 PROCEDURE — 36415 COLL VENOUS BLD VENIPUNCTURE: CPT

## 2020-02-25 PROCEDURE — 94761 N-INVAS EAR/PLS OXIMETRY MLT: CPT

## 2020-02-25 RX ADMIN — CIPROFLOXACIN HYDROCHLORIDE 500 MG: 500 TABLET, FILM COATED ORAL at 09:02

## 2020-02-25 RX ADMIN — METRONIDAZOLE 500 MG: 500 TABLET ORAL at 06:02

## 2020-02-25 RX ADMIN — APIXABAN 5 MG: 5 TABLET, FILM COATED ORAL at 09:02

## 2020-02-25 RX ADMIN — DOCUSATE SODIUM - SENNOSIDES 1 TABLET: 50; 8.6 TABLET, FILM COATED ORAL at 09:02

## 2020-02-25 RX ADMIN — POLYETHYLENE GLYCOL 3350 17 G: 17 POWDER, FOR SOLUTION ORAL at 09:02

## 2020-02-25 RX ADMIN — INSULIN ASPART 4 UNITS: 100 INJECTION, SOLUTION INTRAVENOUS; SUBCUTANEOUS at 09:02

## 2020-02-25 RX ADMIN — LEVOTHYROXINE SODIUM 200 MCG: 100 TABLET ORAL at 06:02

## 2020-02-25 RX ADMIN — METOPROLOL SUCCINATE 100 MG: 100 TABLET, EXTENDED RELEASE ORAL at 09:02

## 2020-02-25 RX ADMIN — AMLODIPINE BESYLATE 10 MG: 10 TABLET ORAL at 09:02

## 2020-02-25 NOTE — PROGRESS NOTES
[facility] Hospital Medicine  Progress Note    Patient Name: Jeni Chacon  MRN: [id number]  Patient Class: IP- Inpatient   Admission Date: 2/7/2020  Length of Stay: 18 days  Attending Physician: Tiara Killian MD  Primary Care Provider: Primary Doctor [facility] Medicine Team: [facility] Chani Mckenzie MD    Subjective:     Principal Problem:Abscess of sigmoid colon        HPI:  Patient is a 74 year old female with medical history significant for hypothyroidism, HTN, DMII with neuropathy, tobacco abuse (with no diagnosis of COPD), and history of CVA s/p  shunt who initially presented to [facility] with complaints of SOB and abdominal pain. Although patient is oriented to person, time, and place, she still is having some odd behavior. So I gathered her history from her, her medical chart, and from calling her daughter. She reports that late January she had been having abdominal pain with associated SOB for a few weeks; despite a heavy smoking history, family reports that she has never had SOB or a diagnosis of COPD. She reports no alleviating or aggravating factors, just that the pain was sharp and diffuse.   She was admitted on 1/19 and had a complicated hospital course. Briefly, patient underwent CT abdomen on 1/20 which showed evidence of cholecystitis as well as an sigmoid colon abscess. She had a percutaneous cholecystostomy tube placed on 1/24. The abscess was not drained as IR felt it was unsafe to do so. During her hospital course, her blood cultures were positive for MSSA and she had evidence of UTI. Her antibiotics were changed a few times, but she currently is on cipro and flagyl for intraabdominal infection prophylaxis and on vanc and rifampin for MSSA bacteremia. KESHIA showed inferobasal hypokinesis and EF of 55%; no vegetation noted.  In addition to this she had O2 saturation in the mid-80s. Initially she was placed on bipap and treated for COPD exacerbation; she  completed a course of steroids and got one dose of Lasix. However her respiratory status worsening and she was placed in MICU and required intubation from 1/20-1/31. She was Bipap dependent following extubation until 2/6 and is now on nasal cannula; of note, she did require pressors during MICU stay. On top of all this, patient also developed paroxysmal a fib. CHADSVASC elevated however she could not be anticoagulated due to gross hematuria requiring blood transfusion. Patient transferred for second IR opinion regarding drainage of sigmoid abscess and out of confusion and frustration from the multiple consultants at [facility].     At the time of my exam, patient was tachycardic, tachypneic, and mildly hypertension. She was AAO x3, however denied all symptoms on review of systems despite appear to have increased work of breathing. She reports that this is her baseline breathing and that she is on 3 L of O2 at home, however she exhibits odd behavior like trying to squirm out of the bed, or being unable to sign blood consent because it does not look right. Of note, patient was transferred with PICC line, however per chart review, it appears that her bacteremia resulted from cultures on 1/20, and the first vanc trough was taken on 1/23. Follow up culture have been negative and patient reports that she got her PICC line more recently. In addition, patient was transferred with NG tube. She reports that she is able to swallow and that the last time she ate was yesterday, however per nursing hand off, patient was to get formal speech eval which was not done due to transfer.      Overview/Hospital Course:  Patient admitted with sigmoid abscess for IR drainage, evaluated by IR and per repeat imaging, size reduced and no intervention. Also came with a percutaneous cholecystostomy tube placed for acute cholecystitis.   She is also evaluated by ID: ID recommend discontinuing vancomycin as abscess is decreasing in size.  Recommend continuing Cipro/Flagyl x 4 weeks. Speech evaluated, to continue dysphagia diet. PT/OT recommending SNF. Awaiting placement. Patient medically ready for discharge. Home health secured 2/24.    Interval History: NAEON. Patient stable and awaiting discharge    Review of Systems   Constitutional: Negative for activity change, chills, diaphoresis and fever.   Respiratory: Negative for cough, shortness of breath and wheezing.    Cardiovascular: Negative for chest pain.   Gastrointestinal: Negative for abdominal pain, constipation, diarrhea, nausea and vomiting.   Genitourinary: Negative for dysuria, frequency and urgency.   Neurological: Negative for dizziness.   Hematological: Does not bruise/bleed easily.     Objective:     Vital Signs (Most Recent):  Temp: 97.9 °F (36.6 °C) (02/25/20 0728)  Pulse: 81 (02/25/20 0728)  Resp: 14 (02/25/20 0728)  BP: (!) 118/59 (02/25/20 0728)  SpO2: (!) 93 % (02/25/20 0728) Vital Signs (24h Range):  Temp:  [97 °F (36.1 °C)-98.9 °F (37.2 °C)] 97.9 °F (36.6 °C)  Pulse:  [80-88] 81  Resp:  [14-18] 14  SpO2:  [91 %-97 %] 93 %  BP: (114-139)/(59-73) 118/59     Weight: 77.6 kg (171 lb 1.2 oz)  Body mass index is 28.47 kg/m².    Intake/Output Summary (Last 24 hours) at 2/25/2020 1215  Last data filed at 2/25/2020 0728  Gross per 24 hour   Intake --   Output 150 ml   Net -150 ml      Physical Exam   Constitutional: She appears well-developed and well-nourished.   HENT:   Head: Normocephalic and atraumatic.   Eyes: Pupils are equal, round, and reactive to light. EOM are normal.   Neck: Normal range of motion. No tracheal deviation present.   Cardiovascular: Normal heart sounds.   No murmur heard.  Pulmonary/Chest: Effort normal. She has no wheezes. She has no rales.   Abdominal: Soft. Bowel sounds are normal. There is no tenderness.   Percutaneous drain   Musculoskeletal: Normal range of motion. She exhibits no edema.   Neurological: She is alert.   Skin: Skin is warm and dry.        Significant Labs: All pertinent labs within the past 24 hours have been reviewed.    Significant Imaging: I have reviewed all pertinent imaging results/findings within the past 24 hours.      Assessment/Plan:      * Abscess of sigmoid colon  - Patient with evidence of  an abscess of 3.5 x 5 x 5.3 cm insinuated between the sigmoid colon and the dome of the urinary bladder, possibly in the setting of sigmoid diverticulitis  - Not drained by IR at the time as it was deemed unsafe to do so  - Family wanted transfer for IR second opinion.   - Evaluated by IR, decreased size of the sigmoid collection, intervention deferred for now  - Blood cultures x2 collected, per outside hospital records, blood cultures have been clear since 1/20 ( See MSSA bacteremia)  - Continue Cipro and flagyl until 3/20      Hypokalemia  -replete prn      Dysphagia  -diet: pureed w/ nectar thick liquids        Paroxysmal A-fib  - Patient with reports of new onset paroxysmal afib during course of hospital stay  - Appears to be in sinus rhythm at the time of initial exam  - Continue metoprolol. Patient was getting 25 mg QID prior to transfer. Unclear if this was out of caution for hypotension or if increased frequency was needed for continued rate control. Will keep at this dose for now as patient may have received some doses prior to transfer. Consider switching to metoprolol succinate in the morning.   - Patient not anticoagulated at this time due to significant hematuria prior to transfer  * [id number] Score for Atrial Fibrillation Stroke Ris  RESULT SUMMARY:  7 points  Stroke risk was 11.2% per year in >90,000 patients (the [place] Atrial Fibrillation Cohort Study) and 15.7% risk of stroke/TIA/systemic embolism  INPUTS:  Age --> 1 = 65-74  Sex --> 1 = Female  CHF history --> 0 = No  Hypertension history --> 1 = Yes  Stroke/TIA/thromboembolism history --> 2 = Yes  Vascular disease history (prior MI, peripheral artery disease, or aortic  plaque) --> 1 = Yes  Diabetes history --> 1 = Yes    -on apixaban 5mg BID and metoprolol          Hematuria  Blood loss anemia    - Patient with complicated hospital course including significant hematuria leading to blood loss anemia and need for blood transfusion.   - Hg stable at 8.5 for now. Patient has purewick, no signs of gross hematuria. INR 1.1  - type and screen ordered, blood consent signed  - Will keep cbc at daily frequency for now. If Hg with significant drop in the morning, consider increasing frequency and transfuse for Hg<7  - in terms of anticoagulation (see paroxysmal afib), hold for now. If Hg remains stable, consider starting anticoagulation - on apixiban        COPD (chronic obstructive pulmonary disease)  Respiratory failure with hypoxia  Respiratory distress    - Patient treated for COPD exacerbation with full course of steroids and breathing treatments prior to transfer  - Patient does not have formal diagnosis of COPD but reports heavy smoking history at a pack a day for many years  - Patient also reports using 3 L O2 at home, but daughter is not sure. Unspecified chronicity of hypoxic respiratory failure  - Will continue breathing treatment q6 and incentive spirometry  - Will follow up vbg as patient has apparent increased work of breathing.  - Patient is not on any inhalers at home, she will likely need rescue inhaler and inhaled corticosteroid at minimum at discharge       MSSA bacteremia  - Patient with evidence of MSSA bacteremia on blood cultures taken on 1/20. First vanc trough drawn on 1/23  - Sensitivities show that staph aureus is sensitive to cefazolin and oxacillin. However patient is still on vanc with documented allergy to penicillins. However patient is unclear as to what reaction she gets and daughter is unclear as well  - In addition, it appears that patient would have received full course of antibiotic for bacteremia as no further blood cultures were positive for MSSA after  1/20 cultures  - Blood cultures repeated  - Evaluated by ID appreciate recs, vancomycin course completed  Off note  shunt was tapped by NSGY in OSH on 1/25 with negative studies  Discussed with NSGY, as patient does not have meningitis,  shunt is not the source of MSSA and if menigitis suspected to first evaluate with LP.         Discharge planning issues  - Medically ready to go to SNF  Follow up OP with GI and ID on 3/20 with repeat CT AP with contrast   - weeklyCBC and CMP ESR CRP sent to ID clinic at  attn to Dr. Snyder     Pt home with  today.        Cholecystitis  - Ct scan done on 1/20 showed gallbladder to be markedly distended with sludge and stones, and with an indistinct wall. There was some pericholecystic inflammation in the posterior part. She had a percutaneous cholecystostomy tube placed on 1/24 for the acute cholecystitis  - Drain still in place  - Will continue cipro flagyl       Type 2 diabetes mellitus, without long-term current use of insulin  - HA1c 6.9    Diet - Dental soft/dysphagia    - Insulin Detemir 5 daily  - Insulin Aspart 4 TID  - SSI     POCT Glucose - Before meals and QHS  Goal BG - 140-180              Hypothyroid  - Continue home levothyroxine.  - 2/7 thyroid labs show TSH elevated at 32, but T4 wnl of 1.05    Essential hypertension  - Patient takes amlodipine and hydrochlorothiazide at home  - metoprolol started for rate control for new onset afib prior to transfer  - Will resume metoprolol and amlodipine as patient is mildly hypertensive  - Resume hydrochlorothiazide as needed        VTE Risk Mitigation (From admission, onward)         Ordered     apixaban tablet 5 mg  2 times daily      02/13/20 0927     Place sequential compression device  Until discontinued      02/08/20 0233     IP VTE HIGH RISK PATIENT  Once      02/07/20 1915                      Chani Mckenzie MD  Department of Hospital Medicine   [facility]

## 2020-02-25 NOTE — PROGRESS NOTES
Nursing staff received call from patient's daughter stating she was un-aware that d/c was taking place today that she is at work now and does 12 hour shifts and will not be available to  her mother until the morning.  IM2 notified of above to back out d/c order.

## 2020-02-25 NOTE — SUBJECTIVE & OBJECTIVE
Interval History: NAEON. Patient stable and awaiting discharge    Review of Systems   Constitutional: Negative for activity change, chills, diaphoresis and fever.   Respiratory: Negative for cough, shortness of breath and wheezing.    Cardiovascular: Negative for chest pain.   Gastrointestinal: Negative for abdominal pain, constipation, diarrhea, nausea and vomiting.   Genitourinary: Negative for dysuria, frequency and urgency.   Neurological: Negative for dizziness.   Hematological: Does not bruise/bleed easily.     Objective:     Vital Signs (Most Recent):  Temp: 97.9 °F (36.6 °C) (02/25/20 0728)  Pulse: 81 (02/25/20 0728)  Resp: 14 (02/25/20 0728)  BP: (!) 118/59 (02/25/20 0728)  SpO2: (!) 93 % (02/25/20 0728) Vital Signs (24h Range):  Temp:  [97 °F (36.1 °C)-98.9 °F (37.2 °C)] 97.9 °F (36.6 °C)  Pulse:  [80-88] 81  Resp:  [14-18] 14  SpO2:  [91 %-97 %] 93 %  BP: (114-139)/(59-73) 118/59     Weight: 77.6 kg (171 lb 1.2 oz)  Body mass index is 28.47 kg/m².    Intake/Output Summary (Last 24 hours) at 2/25/2020 1215  Last data filed at 2/25/2020 0728  Gross per 24 hour   Intake --   Output 150 ml   Net -150 ml      Physical Exam   Constitutional: She appears well-developed and well-nourished.   HENT:   Head: Normocephalic and atraumatic.   Eyes: Pupils are equal, round, and reactive to light. EOM are normal.   Neck: Normal range of motion. No tracheal deviation present.   Cardiovascular: Normal heart sounds.   No murmur heard.  Pulmonary/Chest: Effort normal. She has no wheezes. She has no rales.   Abdominal: Soft. Bowel sounds are normal. There is no tenderness.   Percutaneous drain   Musculoskeletal: Normal range of motion. She exhibits no edema.   Neurological: She is alert.   Skin: Skin is warm and dry.       Significant Labs: All pertinent labs within the past 24 hours have been reviewed.    Significant Imaging: I have reviewed all pertinent imaging results/findings within the past 24 hours.

## 2020-02-25 NOTE — NURSING
D/C instructions reviewed with daughter, questions answered and copy of instructions given to daughter who verbalized understanding of instructions.  Demonstrated how to operate the e-cylinder as well as emptying drainage bag and documenting output and she was able to return demonstration.  Provided daughter with graduated cups to take home.  W/C transport requested.

## 2020-02-26 PROCEDURE — G0180 MD CERTIFICATION HHA PATIENT: HCPCS | Mod: ,,, | Performed by: INTERNAL MEDICINE

## 2020-02-26 PROCEDURE — G0180 PR HOME HEALTH MD CERTIFICATION: ICD-10-PCS | Mod: ,,, | Performed by: INTERNAL MEDICINE

## 2020-02-27 ENCOUNTER — PATIENT OUTREACH (OUTPATIENT)
Dept: ADMINISTRATIVE | Facility: CLINIC | Age: 75
End: 2020-02-27

## 2020-02-27 DIAGNOSIS — Z75.8 DISCHARGE PLANNING ISSUES: ICD-10-CM

## 2020-02-27 DIAGNOSIS — K63.0 ABSCESS OF SIGMOID COLON: Primary | ICD-10-CM

## 2020-02-27 DIAGNOSIS — R13.10 DYSPHAGIA, UNSPECIFIED TYPE: ICD-10-CM

## 2020-02-27 DIAGNOSIS — R53.81 DEBILITY: ICD-10-CM

## 2020-02-27 DIAGNOSIS — K81.9 CHOLECYSTITIS: ICD-10-CM

## 2020-02-27 NOTE — PATIENT INSTRUCTIONS
.absc  Abscess (Antibiotic Treatment Only)  An abscess (sometimes called a boil) happens when bacteria get trapped under the skin and start to grow. Pus forms inside the abscess as the body responds to the bacteria. An abscess can happen with an insect bite, ingrown hair, blocked oil gland, pimple, cyst, or puncture wound.  In the early stages, your wound may be red and tender. For this stage, you may get antibiotics. If the abscess does not get better with antibiotics, it will need to be drained with a small cut.  Home care  These tips will help you care for your abscess at home:  · Soak the wound in hot water or apply hot packs (small towel soaked in hot water) to the area for 20 minutes at a time. Do this 3 to 4 times a day.  · Do not cut, squeeze, or pop the boil yourself.  · Apply antibiotic cream or ointment to the skin 3 to 4 times a day, unless something else was prescribed. Some ointments include an antibiotic plus a pain reliever.  · If your doctor prescribed antibiotics, do not stop taking them until you have finished the medicine or the doctor tells you to stop.  · You may use an over-the-counter pain medicine to control pain, unless another pain medicine was prescribed. If you have chronic liver or kidney disease or ever had a stomach ulcer or gastrointestinal bleeding, talk with your doctor before using these any of these.  Follow-up care  Follow up with your healthcare provider, or as advised. Check your wound each day for the signs of worsening infection listed below.  When to seek medical advice  Get prompt medical attention if any of these occur:  · An increase in redness or swelling  · Red streaks in the skin leading away from the abscess  · An increase in local pain or swelling  · Fever of 100.4ºF (38ºC) or higher, or as directed by your healthcare provider  · Pus or fluid coming from the abscess  · Boil returns after getting better    © 4610-6580 [employer]. [address] [place] [address]  Road, [place] [address]. All rights reserved. This information is not intended as a substitute for professional medical care. Always follow your healthcare professional's instructions.        Understanding Sepsis  Sepsis is a severe response the body has to an infection. It is most often caused by bacteria. It is also known as septicemia, or systemic inflammatory response syndrome (SIRS). Sepsis is a medical emergency. It needs to be treated right away.  What is sepsis?  Sepsis is when the body reacts to an infection with a severe inflammatory response. It can be caused by bacteria, fungus, or a virus. Sepsis can cause many kinds of problems around the body. It can lead severe low blood pressure (shock) and organ failure. This can lead to death if not treated.  Sepsis is most common in:  · Infants and older adults  · People with an infection such as pneumonia, meningitis, or a urinary tract infection  · People who have an illness such as cancer, AIDS, or diabetes  · People being treated with chemotherapy medications or radiation  · People who have had a transplant  Symptoms of sepsis  Symptoms of sepsis can include:  · Chills and shaking  · Rapid heartbeat  · Rapid breathing  · Shortness of breath  · Severe nausea or uncontrolled vomiting  · Confusion  · Dizziness  · Decreased urination  · Severe pain, including in the back or joints   Diagnosing sepsis  If your health care provider thinks you may have sepsis, you will be given tests. You may have blood and urine tests. These are done to look for bacteria, viruses, or fungus. You may also have X-rays or other imaging tests. These may be done to look at your organs to locate the source of infection.  Treating sepsis  If you have sepsis, your health care provider will give you antibiotics through a thin, flexible tube put into a vein in your arm (IV). You will also be given fluids through the IV. You may also be given nutrition or other medications through your IV.  Your health care provider will talk with you about other treatments you may need. These may include using an oxygen mask or a ventilator to help with breathing. Treatment may last at least 7 to 10 days. Sepsis must be treated in the hospital.    © 7864-9105 [employer]. [address] [place] [address], [place] [address]. All rights reserved. This information is not intended as a substitute for professional medical care. Always follow your healthcare professional's instructions.

## 2020-03-05 ENCOUNTER — CARE AT HOME (OUTPATIENT)
Dept: HOME HEALTH SERVICES | Facility: CLINIC | Age: 75
End: 2020-03-05
Payer: MEDICARE

## 2020-03-05 DIAGNOSIS — K81.9 CHOLECYSTITIS: Primary | ICD-10-CM

## 2020-03-05 DIAGNOSIS — D50.0 IRON DEFICIENCY ANEMIA DUE TO CHRONIC BLOOD LOSS: ICD-10-CM

## 2020-03-05 DIAGNOSIS — M15.9 OSTEOARTHRITIS OF MULTIPLE JOINTS, UNSPECIFIED OSTEOARTHRITIS TYPE: ICD-10-CM

## 2020-03-05 DIAGNOSIS — Z74.09 IMPAIRED MOBILITY AND ADLS: ICD-10-CM

## 2020-03-05 DIAGNOSIS — K63.0 ABSCESS OF SIGMOID COLON: ICD-10-CM

## 2020-03-05 DIAGNOSIS — Z78.9 IMPAIRED MOBILITY AND ADLS: ICD-10-CM

## 2020-03-05 DIAGNOSIS — E11.9 TYPE 2 DIABETES MELLITUS WITHOUT COMPLICATION, WITHOUT LONG-TERM CURRENT USE OF INSULIN: ICD-10-CM

## 2020-03-05 PROCEDURE — 99495 TCM SERVICES (MODERATE COMPLEXITY): ICD-10-PCS | Mod: S$GLB,,, | Performed by: NURSE PRACTITIONER

## 2020-03-05 PROCEDURE — 99495 TRANSJ CARE MGMT MOD F2F 14D: CPT | Mod: S$GLB,,, | Performed by: NURSE PRACTITIONER

## 2020-03-05 NOTE — PROGRESS NOTES
[facility]  Transition of Care Home Visit    Visit Date: 3/6/2020  Encounter Provider: Keli Isbell NP  PCP:  Chilango Paredes MD    PRESENTING HISTORY      Patient ID: Jeni Chacon is a 74 y.o. female.    Consult Requested By:  Dr. Tiara Killian  Reason for Consult:  Hospital Follow Up    Jeni is being seen at home due to pt preference, mobility issues     Chief Complaint: Transitional Care      History of Present Illness: Ms. Jeni Chacon is a 74 y.o. female who was recently admitted to the hospital.  Admit: 2/7/2020 to 2/24/2020  Hospital Course:   Patient admitted with sigmoid abscess for IR drainage, evaluated by IR and per repeat imaging, size reduced and no intervention. Also came with a percutaneous cholecystostomy tube placed for acute cholecystitis.   She is also evaluated by ID: ID recommend discontinuing vancomycin as abscess is decreasing in size. Recommend continuing Cipro/Flagyl x 4 weeks. Speech evaluated, to continue dysphagia diet. PT/OT recommending SNF. Awaiting placement. Patient medically ready for discharge. Home health secured 2/24.  ___________________________________________________________________    Today:    HPI:  Pt is found lying in her bed watching TV for this home visit. She is being seen for post hospital follow up after a recent admit for abd pain, sigmoid abscess. She was discharged home with IV antibiotics which she reports she has completed and her PICC line was removed. She reports abd pain is resolved, does report pain to wrist and knees related to arthritis. She has a cholectectomy tube as she was not a candidate for gallbladder surgery during this admit. She reports normal BMs    Review of Systems    Assessments:  · Environmental: clean, single story home, open paths, adequate temp and ligthing  · Functional Status: max assist  · Safety: no issues  · Nutritional: adequate food in home  · Home Health/DME/Supplies: [facility]    PAST HISTORY:     Past Medical  History:   Diagnosis Date    Brain aneurysm     Cancer     thyroid    Diabetes mellitus     Diabetic neuropathy     Hypertension     Hypothyroid     Stroke     Tobacco abuse        Past Surgical History:   Procedure Laterality Date    Breast screening      FETOSCOPIC LASER OF POSTERIOR URETHRAL VALVE W/ SHUNT PLACEMENT      OTHER SURGICAL HISTORY      stent in brain     THYROID SURGERY      TUBAL LIGATION         Family History   Problem Relation Age of Onset    Diabetes Mother     Heart disease Mother     Hypertension Mother     Heart disease Son     HIV Son        Social History     Socioeconomic History    Marital status: Single     Spouse name: Not on file    Number of children: Not on file    Years of education: Not on file    Highest education level: Not on file   Occupational History    Not on file   Social Needs    Financial resource strain: Not on file    Food insecurity:     Worry: Not on file     Inability: Not on file    Transportation needs:     Medical: Not on file     Non-medical: Not on file   Tobacco Use    Smoking status: Former Smoker     Packs/day: 1.00     Types: Cigarettes     Last attempt to quit: 2019     Years since quittin.6    Tobacco comment: Many years   Substance and Sexual Activity    Alcohol use: No    Drug use: No    Sexual activity: Not on file   Lifestyle    Physical activity:     Days per week: Not on file     Minutes per session: Not on file    Stress: Not on file   Relationships    Social connections:     Talks on phone: Not on file     Gets together: Not on file     Attends Holiness service: Not on file     Active member of club or organization: Not on file     Attends meetings of clubs or organizations: Not on file     Relationship status: Not on file   Other Topics Concern    Not on file   Social History Narrative    Not on file       MEDICATIONS & ALLERGIES:     Current Outpatient Medications on File Prior to Visit   Medication Sig  Dispense Refill    lidocaine (LIDODERM) 5 % Place 1 patch onto the skin.      albuterol (ACCUNEB) 1.25 mg/3 mL Nebu Take 3 mLs (1.25 mg total) by nebulization every 6 (six) hours as needed. Rescue (Patient not taking: Reported on 2/27/2020) 90 mL 0    amLODIPine (NORVASC) 10 MG tablet Take 10 mg by mouth once daily.      apixaban (ELIQUIS) 5 mg Tab Take 1 tablet (5 mg total) by mouth 2 (two) times daily. 60 tablet 11    ciprofloxacin HCl (CIPRO) 500 MG tablet Take 1 tablet (500 mg total) by mouth every 12 (twelve) hours. for 25 days 50 tablet 0    conjugated estrogens (PREMARIN) vaginal cream Place vaginally once daily.      glimepiride (AMARYL) 4 MG tablet Take 4 mg by mouth before breakfast.      levothyroxine (SYNTHROID) 200 MCG tablet Take 200 mcg by mouth before breakfast.      metoprolol succinate (TOPROL-XL) 100 MG 24 hr tablet Take 1 tablet (100 mg total) by mouth once daily. 30 tablet 2    metroNIDAZOLE (FLAGYL) 500 MG tablet Take 1 tablet (500 mg total) by mouth every 8 (eight) hours. for 25 days 75 tablet 0     No current facility-administered medications on file prior to visit.         Review of patient's allergies indicates:   Allergen Reactions    Lisinopril Swelling    Penicillins        OBJECTIVE:     Vital Signs:  There were no vitals filed for this visit.  There is no height or weight on file to calculate BMI.     Physical Exam:  Physical Exam   Constitutional: She is oriented to person, place, and time. She appears well-developed and well-nourished. No distress.   HENT:   Head: Normocephalic and atraumatic.   Eyes: Pupils are equal, round, and reactive to light. EOM are normal.   Neck: Normal range of motion. Neck supple.   Cardiovascular: Normal rate, regular rhythm and normal heart sounds.   Pulmonary/Chest: Effort normal and breath sounds normal.   Abdominal: Soft. Bowel sounds are normal.   Protuberant  Venus tube to RUQ draining clear yellow bile.  Site without drainage    Musculoskeletal: Normal range of motion. She exhibits no edema.   Neurological: She is alert and oriented to person, place, and time. No cranial nerve deficit.   Skin: Skin is warm and dry.   Psychiatric: She has a normal mood and affect. Her behavior is normal. Judgment and thought content normal.   Vitals reviewed.      Laboratory  Lab Results   Component Value Date    WBC 5.92 02/25/2020    HGB 9.5 (L) 02/25/2020    HCT 29.8 (L) 02/25/2020    MCV 88 02/25/2020     (H) 02/25/2020     Lab Results   Component Value Date    INR 1.1 02/07/2020     Lab Results   Component Value Date    HGBA1C 6.9 (H) 02/07/2020     No results for input(s): POCTGLUCOSE in the last 72 hours.    Diagnostic Results:      TRANSITION OF CARE:     [facility] Contact Note: 02/27/2020    Family and/or Caretaker present at visit?  Yes.  Diagnostic tests reviewed/disposition: No diagnosic tests pending after this hospitalization.  Disease/illness education:   Home health/community services discussion/referrals: Patient has home health established at [facility].   Establishment or re-establishment of referral orders for community resources: No other necessary community resources.   Discussion with other health care providers: No discussion with other health care providers necessary.     Transition of Care Visit:     I have reviewed and updated the history and problem list.  I have reconciled the medication list.  I have discussed the hospitalization and current medical issues, prognosis and plans with the patient/family.  I  spent more than 50% of time discussing the care with the patient/family.  Total Face-to-Face Encounter: 60 minutes.    Medications Reconciliation:   I have reconciled the patient's home medications and discharge medications with the patient/family. I have updated all changes.  Refer to After-Visit Medication List.    ASSESSMENT & PLAN:     HIGH RISK CONDITION(S):      Jeni was seen today for transitional  care.    Diagnoses and all orders for this visit:    Cholecystitis  -     Ambulatory referral/consult to Home Health; Future  -     Ambulatory referral/consult to Gastroenterology; Future  Venus tube in place, draining clear bile.  Dressing requires changing-changed and clean stoma site with NS applied tegaderm.  Orders to home health to change q3 days and prn  Abscess of sigmoid colon  -     Ambulatory referral/consult to Home Health; Future  -     Ambulatory referral/consult to Gastroenterology; Future  Antibiotics completed, no pain  Keep f/u with ID on 3/10  D/c note report repeat CT and GI appt on 3/20- appts not noted in Epic, referral to GI for scheduling if not already scheduled  Home health notified to assist  Type 2 diabetes mellitus without complication, without long-term current use of insulin  -     Ambulatory referral/consult to Home Health; Future  Stable, AIC 6.9  Continue current plan    Impaired mobility and ADLs  -     Ambulatory referral/consult to Home Health; Future  Debility and difficulty getting out of bed.  Family unsure if pt can get into car for follow up  PT/OT in place   consult for transportation assistance    Osteoarthritis of multiple joints, unspecified osteoarthritis type  -     Ambulatory referral/consult to Home Health; Future  Using Lidocaine patch  Continue to monitor  Iron deficiency anemia due to chronic blood loss        Were controlled substances prescribed?  No    Instructions for the patient:  Take all medications as prescribed.  Keep upcoming follow up appt for ID.  Referral placed for follow up with GI  ADA diet    Scheduled Follow-up :  Future Appointments   Date Time Provider Department Center   3/10/2020  4:00 PM Anya Snyder MD [facility] ID Shree Ruby       After Visit Medication List :     Medication List           Accurate as of March 5, 2020 11:59 PM. If you have any questions, ask your nurse or doctor.               CONTINUE taking these medications     albuterol 1.25 mg/3 mL Nebu  Commonly known as:  ACCUNEB  Take 3 mLs (1.25 mg total) by nebulization every 6 (six) hours as needed. Rescue     amLODIPine 10 MG tablet  Commonly known as:  NORVASC     ciprofloxacin HCl 500 MG tablet  Commonly known as:  CIPRO  Take 1 tablet (500 mg total) by mouth every 12 (twelve) hours. for 25 days     conjugated estrogens vaginal cream  Commonly known as:  PREMARIN     Eliquis 5 mg Tab  Generic drug:  apixaban  Take 1 tablet (5 mg total) by mouth 2 (two) times daily.     glimepiride 4 MG tablet  Commonly known as:  AMARYL     levothyroxine 200 MCG tablet  Commonly known as:  SYNTHROID     lidocaine 5 %  Commonly known as:  LIDODERM     metoprolol succinate 100 MG 24 hr tablet  Commonly known as:  TOPROL-XL  Take 1 tablet (100 mg total) by mouth once daily.     metroNIDAZOLE 500 MG tablet  Commonly known as:  FLAGYL  Take 1 tablet (500 mg total) by mouth every 8 (eight) hours. for 25 days            Signature:  Keli Isbell NP    Patient consent obtained prior to treatment

## 2020-03-06 RX ORDER — LIDOCAINE 50 MG/G
1 PATCH TOPICAL
COMMUNITY
Start: 2020-03-03 | End: 2020-04-02

## 2020-03-09 ENCOUNTER — EXTERNAL HOME HEALTH (OUTPATIENT)
Dept: HOME HEALTH SERVICES | Facility: HOSPITAL | Age: 75
End: 2020-03-09
Payer: MEDICARE

## 2020-03-11 ENCOUNTER — TELEPHONE (OUTPATIENT)
Dept: INTERNAL MEDICINE | Facility: CLINIC | Age: 75
End: 2020-03-11

## 2020-03-11 RX ORDER — LANCETS 26 GAUGE
EACH MISCELLANEOUS
Qty: 100 EACH | Refills: 2 | Status: SHIPPED | OUTPATIENT
Start: 2020-03-11

## 2020-03-11 RX ORDER — INSULIN PUMP SYRINGE, 3 ML
EACH MISCELLANEOUS
Qty: 60 EACH | Refills: 2 | Status: SHIPPED | OUTPATIENT
Start: 2020-03-11 | End: 2021-03-11

## 2020-03-11 NOTE — TELEPHONE ENCOUNTER
Received message from home health requesting glucometer and supplies for patient. Orders sent to pharmacy of choice

## 2020-03-19 ENCOUNTER — LAB VISIT (OUTPATIENT)
Dept: LAB | Facility: HOSPITAL | Age: 75
End: 2020-03-19
Attending: FAMILY MEDICINE
Payer: MEDICARE

## 2020-03-19 DIAGNOSIS — N39.0 URINARY TRACT INFECTION, SITE NOT SPECIFIED: Primary | ICD-10-CM

## 2020-03-19 LAB
BACTERIA #/AREA URNS AUTO: ABNORMAL /HPF
BILIRUB UR QL STRIP: NEGATIVE
CLARITY UR REFRACT.AUTO: ABNORMAL
COLOR UR AUTO: YELLOW
GLUCOSE UR QL STRIP: NEGATIVE
HGB UR QL STRIP: NEGATIVE
KETONES UR QL STRIP: ABNORMAL
LEUKOCYTE ESTERASE UR QL STRIP: ABNORMAL
MICROSCOPIC COMMENT: ABNORMAL
NITRITE UR QL STRIP: NEGATIVE
NON-SQ EPI CELLS #/AREA URNS AUTO: <1 /HPF
PH UR STRIP: 6 [PH] (ref 5–8)
PROT UR QL STRIP: NEGATIVE
RBC #/AREA URNS AUTO: 3 /HPF (ref 0–4)
SP GR UR STRIP: 1.01 (ref 1–1.03)
SQUAMOUS #/AREA URNS AUTO: 0 /HPF
URN SPEC COLLECT METH UR: ABNORMAL
WBC #/AREA URNS AUTO: >100 /HPF (ref 0–5)
WBC CLUMPS UR QL AUTO: ABNORMAL

## 2020-03-19 PROCEDURE — 87086 URINE CULTURE/COLONY COUNT: CPT

## 2020-03-19 PROCEDURE — 81001 URINALYSIS AUTO W/SCOPE: CPT

## 2020-03-20 LAB — BACTERIA UR CULT: NO GROWTH

## 2020-03-24 ENCOUNTER — OFFICE VISIT (OUTPATIENT)
Dept: INFECTIOUS DISEASES | Facility: CLINIC | Age: 75
End: 2020-03-24
Payer: MEDICARE

## 2020-03-24 VITALS
TEMPERATURE: 98 F | SYSTOLIC BLOOD PRESSURE: 111 MMHG | BODY MASS INDEX: 24.16 KG/M2 | HEART RATE: 68 BPM | DIASTOLIC BLOOD PRESSURE: 75 MMHG | WEIGHT: 145 LBS | HEIGHT: 65 IN

## 2020-03-24 DIAGNOSIS — K65.1 INTRA-ABDOMINAL ABSCESS: Primary | ICD-10-CM

## 2020-03-24 DIAGNOSIS — T85.518A CHOLECYSTOSTOMY TUBE DYSFUNCTION, INITIAL ENCOUNTER: ICD-10-CM

## 2020-03-24 DIAGNOSIS — K63.0 ABSCESS OF SIGMOID COLON: ICD-10-CM

## 2020-03-24 PROCEDURE — 99999 PR PBB SHADOW E&M-EST. PATIENT-LVL III: CPT | Mod: PBBFAC,,, | Performed by: INTERNAL MEDICINE

## 2020-03-24 PROCEDURE — 99213 OFFICE O/P EST LOW 20 MIN: CPT | Mod: PBBFAC | Performed by: INTERNAL MEDICINE

## 2020-03-24 PROCEDURE — 99213 PR OFFICE/OUTPT VISIT, EST, LEVL III, 20-29 MIN: ICD-10-PCS | Mod: S$GLB,,, | Performed by: INTERNAL MEDICINE

## 2020-03-24 PROCEDURE — 99999 PR PBB SHADOW E&M-EST. PATIENT-LVL III: ICD-10-PCS | Mod: PBBFAC,,, | Performed by: INTERNAL MEDICINE

## 2020-03-24 PROCEDURE — 99213 OFFICE O/P EST LOW 20 MIN: CPT | Mod: S$GLB,,, | Performed by: INTERNAL MEDICINE

## 2020-03-24 NOTE — PROGRESS NOTES
"Subjective:      Patient ID: Jeni Chacon is a 74 y.o. female.    Chief Complaint:Follow-up (eoc eval)      History of Present Illness  HPI     Follow-up      Additional comments: eoc eval          Last edited by Juan Flores MA on 3/24/2020  2:01 PM. (History)      A 74-year-old woman with HTN, prior stoke, DM 2, hypothyroidism, MSSA bacteremia in January 2020, acute cholecystitis s/p cholecystostomy tube, and intraabdominal abscess who is seen as a hospital follow up. She was discharged from [facility] to SNF and subsequently home to complete a 4 week course of Cipro plus metronidazole with a scheduled end date of 3/20. She is still taking antibiotics and as approximately one weeks worth of both. She has no complaints. Her cholecystostomy tube "came out" per her daughter on the day prior to this visit.     Review of Systems   Constitution: Negative for chills, decreased appetite, fever, malaise/fatigue, night sweats, weight gain and weight loss.   HENT: Negative for congestion, ear pain, hearing loss, hoarse voice, sore throat and tinnitus.    Eyes: Negative for blurred vision, redness and visual disturbance.   Cardiovascular: Negative for chest pain, leg swelling and palpitations.   Respiratory: Negative for cough, hemoptysis, shortness of breath and sputum production.    Hematologic/Lymphatic: Negative for adenopathy. Does not bruise/bleed easily.   Skin: Negative for dry skin, itching, rash and suspicious lesions.   Musculoskeletal: Negative for back pain, joint pain, myalgias and neck pain.   Gastrointestinal: Negative for abdominal pain, constipation, diarrhea, heartburn, nausea and vomiting.   Genitourinary: Negative for dysuria, flank pain, frequency, hematuria, hesitancy and urgency.   Neurological: Negative for dizziness, headaches, numbness, paresthesias and weakness.   Psychiatric/Behavioral: Negative for depression and memory loss. The patient does not have insomnia and is not nervous/anxious.  "     Objective:   Physical Exam   Constitutional: She is oriented to person, place, and time. She appears well-developed and well-nourished. She is cooperative. She is easily aroused.  Non-toxic appearance. No distress.   HENT:   Head: Normocephalic and atraumatic. Head is without right periorbital erythema and without left periorbital erythema.   Right Ear: Hearing, tympanic membrane, external ear and ear canal normal. No swelling.   Left Ear: Hearing, tympanic membrane, external ear and ear canal normal. No swelling.   Nose: No nasal deformity.   Mouth/Throat: Uvula is midline and mucous membranes are normal.   Eyes: Conjunctivae and lids are normal. Right eye exhibits no discharge and no exudate. Left eye exhibits no discharge and no exudate. Right conjunctiva is not injected. Left conjunctiva is not injected. No scleral icterus.   Cardiovascular: Normal rate, regular rhythm, S1 normal, S2 normal, normal heart sounds and intact distal pulses. Exam reveals no gallop and no friction rub.   No murmur heard.  Pulmonary/Chest: Effort normal and breath sounds normal. No accessory muscle usage or stridor. No tachypnea. No respiratory distress. She has no wheezes. She has no rales. She exhibits no tenderness.   Abdominal: Soft. Normal appearance and bowel sounds are normal. She exhibits no distension. There is no tenderness. There is no rebound and no guarding.   Cholecystostomy tube outside of the body attached to RUQ via a suture.    Musculoskeletal: Normal range of motion. She exhibits no edema or tenderness.   Neurological: She is alert, oriented to person, place, and time and easily aroused. No cranial nerve deficit. Coordination normal.   Skin: Skin is warm and dry. No lesion and no rash noted. She is not diaphoretic. No cyanosis or erythema. No pallor. Nails show no clubbing.   Psychiatric: She has a normal mood and affect. Her speech is normal. Judgment and thought content normal. She is slowed. Cognition and  memory are impaired.   Nursing note and vitals reviewed.    Assessment:       1. Intra-abdominal abscess    2. Abscess of sigmoid colon    3. Cholecystostomy tube dysfunction, initial encounter        Plan:   Patient with intraabdominal abscess and sigmoid abscess. She was did not have CT of the abdomen scheduled and is not able to get it done due to current health crisis. She was not given instructions to follow up with IR or GI as per ID service recommendations.   · Cholecystostomy tube suture removed as tube was already extracted.   · Patient and daughter advised to complete antibiotics prescribed.   · Signs and symptoms of recurrent infection discussed with the patient and her daughter. They have been advised to go to the ED or call clinic for further management.   · RTC as needed.

## 2020-05-28 RX ORDER — METOPROLOL SUCCINATE 100 MG/1
TABLET, EXTENDED RELEASE ORAL
Qty: 30 TABLET | Refills: 1 | OUTPATIENT
Start: 2020-05-28